# Patient Record
Sex: FEMALE | Race: WHITE | NOT HISPANIC OR LATINO | Employment: FULL TIME | URBAN - METROPOLITAN AREA
[De-identification: names, ages, dates, MRNs, and addresses within clinical notes are randomized per-mention and may not be internally consistent; named-entity substitution may affect disease eponyms.]

---

## 2017-10-17 ENCOUNTER — ALLSCRIPTS OFFICE VISIT (OUTPATIENT)
Dept: OTHER | Facility: OTHER | Age: 32
End: 2017-10-17

## 2017-10-18 NOTE — PROCEDURES
Assessment  1  Amenorrhea, secondary (626 0) (N91 1)    Plan  Bleeding precautions given  Return in 1 week for repeat ultrasound  Active Problems  1  Abdominal pain (789 00) (R10 9)   2  Arthralgia of multiple joints (719 49) (M25 50)   3  Cervical polyp (622 7) (N84 1)   4  Depression with anxiety (300 4) (F41 8)   5  Encounter for annual physical exam (V70 0) (Z00 00)   6  Encounter for Papanicolaou smear for cervical cancer screening (V76 2) (Z12 4)   7  Encounter for preconception consultation (V26 49) (Z31 69)   8  Fatigue (780 79) (R53 83)   9  Female pelvic pain (625 9) (R10 2)   10  Irritable bowel syndrome (564 1) (K58 9)   11  Pap smear abnormality of cervix with ASCUS favoring benign (795 01) (R87 610)   12  UTI (urinary tract infection) (599 0) (N39 0)   13  Vitamin D deficiency (268 9) (E55 9)    Current Meds  1  Vitafol Oral Tablet; Therapy: (Recorded:17Oct2017) to Recorded    Allergies  1  No Known Drug Allergies    Results/Data  79510 Transvaginal Ultrasound OB [facility]:   Procedure: [id number] pregnant uterus real time with image documentation, fetal and maternal evaluation, first trimester (<14 weeks 0 days), transvaginal approach: single or first gestation  -- The study was done today in the office  Exam indication: Amenorrhea  Findings:   Number of gestational sacs and fetuses: One sac, 1 fetus  Gestational sac/fetal measurements appropriate for gestation: CRL at 6 and 0/7 weeks  Survey of visible fetal and placental anatomic structure No fetal heart rate seen  Qualitative assessment of amniotic fluid volume/gestational sac shape: Normal fluid, small gestational sac; + yolk sac  Exam of maternal uterus and adnexa: Normal uterus and ovaries; no free fluid        Signatures   Electronically signed by : VENANCIO Porter ; Oct 17 2017  4:09PM EST                       (Author)

## 2017-10-19 ENCOUNTER — GENERIC CONVERSION - ENCOUNTER (OUTPATIENT)
Dept: OTHER | Facility: OTHER | Age: 32
End: 2017-10-19

## 2017-10-26 ENCOUNTER — HOSPITAL ENCOUNTER (EMERGENCY)
Facility: HOSPITAL | Age: 32
Discharge: HOME | End: 2017-10-26
Attending: EMERGENCY MEDICINE | Admitting: EMERGENCY MEDICINE
Payer: COMMERCIAL

## 2017-10-26 ENCOUNTER — GENERIC CONVERSION - ENCOUNTER (OUTPATIENT)
Dept: OTHER | Facility: OTHER | Age: 32
End: 2017-10-26

## 2017-10-26 VITALS
OXYGEN SATURATION: 100 % | SYSTOLIC BLOOD PRESSURE: 108 MMHG | HEIGHT: 63 IN | TEMPERATURE: 99.2 F | HEART RATE: 86 BPM | WEIGHT: 138 LBS | RESPIRATION RATE: 18 BRPM | BODY MASS INDEX: 24.45 KG/M2 | DIASTOLIC BLOOD PRESSURE: 63 MMHG

## 2017-10-26 DIAGNOSIS — N93.9 VAGINAL BLEEDING: Primary | ICD-10-CM

## 2017-10-26 DIAGNOSIS — O03.9 SPONTANEOUS MISCARRIAGE: ICD-10-CM

## 2017-10-26 LAB
ABO GROUP BLD: NORMAL
ALBUMIN SERPL BCP-MCNC: 4.1 G/DL (ref 3.5–5)
ALP SERPL-CCNC: 50 U/L (ref 46–116)
ALT SERPL W P-5'-P-CCNC: 26 U/L (ref 12–78)
ANION GAP SERPL CALCULATED.3IONS-SCNC: 6 MMOL/L (ref 4–13)
APTT PPP: 29 SECONDS (ref 23–35)
AST SERPL W P-5'-P-CCNC: 16 U/L (ref 5–45)
BASOPHILS # BLD AUTO: 0.11 THOUSANDS/ΜL (ref 0–0.1)
BASOPHILS NFR BLD AUTO: 1 % (ref 0–1)
BILIRUB SERPL-MCNC: 0.33 MG/DL (ref 0.2–1)
BLD GP AB SCN SERPL QL: NEGATIVE
BUN SERPL-MCNC: 15 MG/DL (ref 5–25)
CALCIUM SERPL-MCNC: 9.3 MG/DL (ref 8.3–10.1)
CHLORIDE SERPL-SCNC: 105 MMOL/L (ref 100–108)
CO2 SERPL-SCNC: 26 MMOL/L (ref 21–32)
CREAT SERPL-MCNC: 0.84 MG/DL (ref 0.6–1.3)
EOSINOPHIL # BLD AUTO: 0.23 THOUSAND/ΜL (ref 0–0.61)
EOSINOPHIL NFR BLD AUTO: 2 % (ref 0–6)
ERYTHROCYTE [DISTWIDTH] IN BLOOD BY AUTOMATED COUNT: 13.5 % (ref 11.6–15.1)
GFR SERPL CREATININE-BSD FRML MDRD: 92 ML/MIN/1.73SQ M
GLUCOSE SERPL-MCNC: 93 MG/DL (ref 65–140)
HCT VFR BLD AUTO: 34.9 % (ref 34.8–46.1)
HGB BLD-MCNC: 12 G/DL (ref 11.5–15.4)
INR PPP: 1.1 (ref 0.86–1.16)
LYMPHOCYTES # BLD AUTO: 4.75 THOUSANDS/ΜL (ref 0.6–4.47)
LYMPHOCYTES NFR BLD AUTO: 33 % (ref 14–44)
MCH RBC QN AUTO: 29.6 PG (ref 26.8–34.3)
MCHC RBC AUTO-ENTMCNC: 34.4 G/DL (ref 31.4–37.4)
MCV RBC AUTO: 86 FL (ref 82–98)
MONOCYTES # BLD AUTO: 1.12 THOUSAND/ΜL (ref 0.17–1.22)
MONOCYTES NFR BLD AUTO: 8 % (ref 4–12)
NEUTROPHILS # BLD AUTO: 8.15 THOUSANDS/ΜL (ref 1.85–7.62)
NEUTS SEG NFR BLD AUTO: 56 % (ref 43–75)
NRBC BLD AUTO-RTO: 0 /100 WBCS
PLATELET # BLD AUTO: 371 THOUSANDS/UL (ref 149–390)
PMV BLD AUTO: 9.6 FL (ref 8.9–12.7)
POTASSIUM SERPL-SCNC: 4 MMOL/L (ref 3.5–5.3)
PROT SERPL-MCNC: 8.2 G/DL (ref 6.4–8.2)
PROTHROMBIN TIME: 14.2 SECONDS (ref 12.1–14.4)
RBC # BLD AUTO: 4.05 MILLION/UL (ref 3.81–5.12)
RH BLD: POSITIVE
SODIUM SERPL-SCNC: 137 MMOL/L (ref 136–145)
SPECIMEN EXPIRATION DATE: NORMAL
WBC # BLD AUTO: 14.4 THOUSAND/UL (ref 4.31–10.16)

## 2017-10-26 PROCEDURE — 96360 HYDRATION IV INFUSION INIT: CPT

## 2017-10-26 PROCEDURE — 85610 PROTHROMBIN TIME: CPT | Performed by: EMERGENCY MEDICINE

## 2017-10-26 PROCEDURE — 80053 COMPREHEN METABOLIC PANEL: CPT | Performed by: EMERGENCY MEDICINE

## 2017-10-26 PROCEDURE — 88305 TISSUE EXAM BY PATHOLOGIST: CPT | Performed by: OBSTETRICS & GYNECOLOGY

## 2017-10-26 PROCEDURE — 85025 COMPLETE CBC W/AUTO DIFF WBC: CPT | Performed by: EMERGENCY MEDICINE

## 2017-10-26 PROCEDURE — 96361 HYDRATE IV INFUSION ADD-ON: CPT

## 2017-10-26 PROCEDURE — 36415 COLL VENOUS BLD VENIPUNCTURE: CPT | Performed by: EMERGENCY MEDICINE

## 2017-10-26 PROCEDURE — 86900 BLOOD TYPING SEROLOGIC ABO: CPT | Performed by: EMERGENCY MEDICINE

## 2017-10-26 PROCEDURE — 86850 RBC ANTIBODY SCREEN: CPT | Performed by: EMERGENCY MEDICINE

## 2017-10-26 PROCEDURE — 86901 BLOOD TYPING SEROLOGIC RH(D): CPT | Performed by: EMERGENCY MEDICINE

## 2017-10-26 PROCEDURE — 85730 THROMBOPLASTIN TIME PARTIAL: CPT | Performed by: EMERGENCY MEDICINE

## 2017-10-26 PROCEDURE — 99284 EMERGENCY DEPT VISIT MOD MDM: CPT

## 2017-10-26 RX ADMIN — SODIUM CHLORIDE 1000 ML: 0.9 INJECTION, SOLUTION INTRAVENOUS at 16:04

## 2017-10-26 NOTE — ED NOTES
OB at bedside evaluating patient, states she can be discharged once her fluids are in       Tejinder Velázquez RN  10/26/17 6141

## 2017-10-26 NOTE — ED PROVIDER NOTES
History  Chief Complaint   Patient presents with    Vaginal Bleeding - Pregnant     Pt states was sent in from [facility] office for heavy vaginal bleeding  Pt 11 weeks pregnant and having miscarriage, Pt received medication from OB prior to arrival   Pt states soaking three pads an hour     50-year-old female presents for evaluation of vaginal bleeding  Patient reports vaginal spotting for the past week; however, vaginal bleeding significantly worsened today around 9:45 a m  Nery Cutting Patient states that she has gone through 3 pads an hour since worsening of the bleeding  Vaginal bleeding was initially associated with cramping lower abdominal pain which resolved after evacuation of the uterus at her OB office  Patient went to see her obstetrician, Dr Nakita Escoto, this afternoon was concerned that the amount of bleeding  Patient states that after Dr Nakita Escoto performed the evacuation, she noted fairly rapid reaccumulation of the blood  Patient was given Cytotec in the office and sent to the emergency department for further evaluation  Patient reports postural lightheadedness, but no episodes of syncope  No recent fevers or chills  No nausea or vomiting  Patient is otherwise healthy with no history of bleeding disorders  History of  with prior pregnancy in   On review of records, patient had an ultrasound on 10/17/17 that showed an intrauterine pregnancy with no detectable heart rate  History provided by:  Patient  Vaginal Bleeding   Quality:  Dark red, bright red, passed tissue and clots  Severity:  Severe  Onset quality:  Gradual  Duration:  1 week  Timing:  Constant  Progression:  Worsening  Chronicity:  New  Number of pads used:  3/hour  Possible pregnancy: known fetal demise  Context: spontaneously    Relieved by: Cytotec    Worsened by:  Nothing  Ineffective treatments:  None tried  Associated symptoms: fatigue    Associated symptoms: no abdominal pain, no dysuria, no fever and no nausea    Risk factors: no bleeding disorder        None       History reviewed  No pertinent past medical history  History reviewed  No pertinent surgical history  History reviewed  No pertinent family history  I have reviewed and agree with the history as documented  Social History   Substance Use Topics    Smoking status: Former Smoker    Smokeless tobacco: Never Used    Alcohol use No        Review of Systems   Constitutional: Positive for fatigue  Negative for appetite change, chills and fever  HENT: Negative for congestion, rhinorrhea and sore throat  Respiratory: Negative for cough, chest tightness and shortness of breath  Cardiovascular: Negative for chest pain, palpitations and leg swelling  Gastrointestinal: Negative for abdominal pain, constipation, diarrhea, nausea and vomiting  Genitourinary: Positive for vaginal bleeding  Negative for dysuria, frequency and hematuria  Musculoskeletal: Negative for myalgias, neck pain and neck stiffness  Skin: Negative for pallor and rash  Neurological: Positive for light-headedness  Negative for syncope and headaches  All other systems reviewed and are negative  Physical Exam  ED Triage Vitals [10/26/17 1508]   Temperature Pulse Respirations Blood Pressure SpO2   99 2 °F (37 3 °C) (!) 108 17 110/54 98 %      Temp Source Heart Rate Source Patient Position - Orthostatic VS BP Location FiO2 (%)   Oral Monitor Sitting Left arm --      Pain Score       2           Orthostatic Vital Signs  Vitals:    10/26/17 1508 10/26/17 1600 10/26/17 1656   BP: 110/54 108/59 105/73   Pulse: (!) 108 86 83   Patient Position - Orthostatic VS: Sitting Lying Lying       Physical Exam   Constitutional: She is oriented to person, place, and time  She appears well-developed and well-nourished  Non-toxic appearance  No distress  HENT:   Head: Normocephalic and atraumatic  Eyes: Conjunctivae and EOM are normal  Pupils are equal, round, and reactive to light     Neck: Normal range of motion  Neck supple  No tracheal deviation present  No thyromegaly present  Cardiovascular: Regular rhythm, normal heart sounds and intact distal pulses  Tachycardia present  Pulmonary/Chest: Effort normal and breath sounds normal    Abdominal: Soft  Bowel sounds are normal  She exhibits no distension  There is no tenderness  Musculoskeletal: She exhibits no edema  Lymphadenopathy:     She has no cervical adenopathy  Neurological: She is alert and oriented to person, place, and time  Skin: Skin is warm and dry  She is not diaphoretic  Psychiatric: She has a normal mood and affect  Her behavior is normal  Judgment and thought content normal    Nursing note and vitals reviewed  ED Medications  Medications   sodium chloride 0 9 % bolus 1,000 mL (1,000 mL Intravenous New Bag 10/26/17 1605)       Diagnostic Studies  Results Reviewed     Procedure Component Value Units Date/Time    Comprehensive metabolic panel [[id number]] Collected:  10/26/17 1555    Lab Status:  Final result Specimen:  Blood from Arm, Left Updated:  10/26/17 1620     Sodium 137 mmol/L      Potassium 4 0 mmol/L      Chloride 105 mmol/L      CO2 26 mmol/L      Anion Gap 6 mmol/L      BUN 15 mg/dL      Creatinine 0 84 mg/dL      Glucose 93 mg/dL      Calcium 9 3 mg/dL      AST 16 U/L      ALT 26 U/L      Alkaline Phosphatase 50 U/L      Total Protein 8 2 g/dL      Albumin 4 1 g/dL      Total Bilirubin 0 33 mg/dL      eGFR 92 ml/min/1 73sq m     Narrative:         National Kidney Disease Education Program recommendations are as follows:  GFR calculation is accurate only with a steady state creatinine  Chronic Kidney disease less than 60 ml/min/1 73 sq  meters  Kidney failure less than 15 ml/min/1 73 sq  meters      [facility] [[id number]]  (Normal) Collected:  10/26/17 1555    Lab Status:  Final result Specimen:  Blood from Arm, Left Updated:  10/26/17 1616     Protime 14 2 seconds      INR 1 10    APTT [[id number]]  (Normal) Collected:  10/26/17 1555    Lab Status:  Final result Specimen:  Blood from Arm, Left Updated:  10/26/17 1616     PTT 29 seconds     Narrative: Therapeutic Heparin Range = 60-90 seconds    CBC and differential [[id number]]  (Abnormal) Collected:  10/26/17 1555    Lab Status:  Final result Specimen:  Blood from Arm, Left Updated:  10/26/17 1607     WBC 14 40 (H) Thousand/uL      RBC 4 05 Million/uL      Hemoglobin 12 0 g/dL      Hematocrit 34 9 %      MCV 86 fL      MCH 29 6 pg      MCHC 34 4 g/dL      RDW 13 5 %      MPV 9 6 fL      Platelets 153 Thousands/uL      nRBC 0 /100 WBCs      Neutrophils Relative 56 %      Lymphocytes Relative 33 %      Monocytes Relative 8 %      Eosinophils Relative 2 %      Basophils Relative 1 %      Neutrophils Absolute 8 15 (H) Thousands/µL      Lymphocytes Absolute 4 75 (H) Thousands/µL      Monocytes Absolute 1 12 Thousand/µL      Eosinophils Absolute 0 23 Thousand/µL      Basophils Absolute 0 11 (H) Thousands/µL                  No orders to display         Procedures  Procedures      Phone Consults  ED Phone Contact    ED Course  ED Course                                MDM  Number of Diagnoses or Management Options  Spontaneous miscarriage: new and requires workup  Vaginal bleeding: new and requires workup  Diagnosis management comments: 76-year-old female with a known fetal demise presents for evaluation of increased vaginal bleeding  Patient tachycardic with postural lightheadedness  Patient given 1 L NS bolus  OB/Gyn consulted and evaluated the patient in the emergency department  Several clots removed by OB/Gyn  Bleeding significantly decreased  Patient feeling well  Labs unremarkable  Discharged with OB/Gyn follow up and strict return precautions         Amount and/or Complexity of Data Reviewed  Clinical lab tests: ordered and reviewed    Patient Progress  Patient progress: stable    CritCare Time    Disposition  Final diagnoses:   Vaginal bleeding   Spontaneous miscarriage     Time reflects when diagnosis was documented in both MDM as applicable and the Disposition within this note     Time User Action Codes Description Comment    10/26/2017  3:51 PM Norma Nguyen Add [N93 9] Vaginal bleeding     10/26/2017  5:34 PM Elizabeth Roland Add [O03 9] Spontaneous miscarriage       ED Disposition     ED Disposition Condition Comment    Discharge  Chelsea Fraga discharge to home/self care  Condition at discharge: Stable        Follow-up Information     Follow up With Specialties Details Why Contact Info Additional Chandra Gonzalez MD Obstetrics and Gynecology Schedule an appointment as soon as possible for a visit in 3 days for re-evaluation [address] [address]  [phone]       [address] [place] [address] Emergency Department Emergency Medicine Go to If symptoms worsen [address] [facility] ED, [address] [place] [address], [id number]        Patient's Medications    No medications on file     No discharge procedures on file  ED Provider  Attending physically available and evaluated Chelsea Fraga I managed the patient along with the ED Attending      Electronically Signed by         Teri Roberts MD  Resident  10/26/17 9331

## 2017-10-26 NOTE — CONSULTS
Consultation - Gynecology  Canelo Fraga 34 y o  female MRN: [id number]  Unit/Bed#: ED 23 Encounter: [id number]      Inpatient consult to Obstetrics / Gynecology  Consult performed by: Randy Lomeli  Consult ordered by: Lenora Eden          Chief Complaint   Patient presents with    Vaginal Bleeding - Pregnant     Pt states was sent in from [facility] office for heavy vaginal bleeding  Pt 11 weeks pregnant and having miscarriage, Pt received medication from OB prior to arrival   Pt states soaking three pads an hour       History of Present Illness   Physician Requesting Consult: Samantha Fernandez DO  Reason for Consult / Principal Problem: incomplete , vaginal bleeding  Subspeciality: General GYN  HPI: Daily Hollis is a 34y o  year old female who presents with heavy vaginal bleeding associated with incomplete   Patient was evaluated in the office by her primary gyn and then sent to the ED for further evaluation and observation from the office  She reports that she had been having heavy bleeding, soaking 2-3 pads/hour, for the past 4 hours and passing multiple large clots and tissue  Some tissue was removed from the external cervical os in the office, and she was provided with 800mcg misoprostol buccally prior to presenting to the ED  Ultrasound in office noted small gestational sac in lower uterine segment  Since arriving in the ED, her cramping has subsided significantly  Bleeding has also subsided significantly as well  Review of Systems   Respiratory: Negative for chest tightness, shortness of breath and wheezing  Cardiovascular: Negative for chest pain and palpitations  Gastrointestinal: Negative for abdominal pain, constipation, diarrhea and vomiting  Genitourinary: Positive for vaginal bleeding  Negative for difficulty urinating, dyspareunia, vaginal discharge and vaginal pain  Neurological: Negative for dizziness, syncope, weakness and light-headedness  Historical Information   History reviewed  No pertinent past medical history  History reviewed  No pertinent surgical history  OB History    Para Term  AB Living   1             SAB TAB Ectopic Multiple Live Births                  # Outcome Date GA Lbr Tray/2nd Weight Sex Delivery Anes PTL Lv   1 Current                 History reviewed  No pertinent family history  Social History   History   Alcohol Use No     History   Drug Use No     History   Smoking Status    Former Smoker   Smokeless Tobacco    Never Used       Meds/Allergies   No current facility-administered medications for this encounter  No Known Allergies    Objective   Vitals: Blood pressure 105/73, pulse 83, temperature 99 2 °F (37 3 °C), temperature source Oral, resp  rate 18, height 5' 3" (1 6 m), weight 62 6 kg (138 lb), SpO2 100 %  Body mass index is 24 45 kg/m²  No intake or output data in the 24 hours ending 10/26/17 1734    Invasive Devices          No matching active lines, drains, or airways          Physical Exam   Constitutional: She is oriented to person, place, and time  She appears well-developed and well-nourished  No distress  HENT:   Head: Normocephalic and atraumatic  Cardiovascular: Normal rate and normal heart sounds  Pulmonary/Chest: Effort normal  No respiratory distress  She exhibits no tenderness  Abdominal: Soft  There is no tenderness  There is no rebound and no guarding  Genitourinary: Vagina normal    Genitourinary Comments: Sterile speculum exam: plum size clot (~100cc) visualized in vagina  Removed easily with proctoswabs  Observed bleeding from cervix, bleeding controlled and minimal    Bimanual exam: uterus mobile, small in size and deep in pelvis  Cervix closed  No masses palpated bilaterally  Musculoskeletal: Normal range of motion  Neurological: She is alert and oriented to person, place, and time  Skin: Skin is warm and dry  She is not diaphoretic     Psychiatric: She has a normal mood and affect  Her behavior is normal  Judgment and thought content normal        Lab Results:   Recent Results (from the past 24 hour(s))   CBC and differential    Collection Time: 10/26/17  3:55 PM   Result Value Ref Range    WBC 14 40 (H) 4 31 - 10 16 Thousand/uL    RBC 4 05 3 81 - 5 12 Million/uL    Hemoglobin 12 0 11 5 - 15 4 g/dL    Hematocrit 34 9 34 8 - 46 1 %    MCV 86 82 - 98 fL    MCH 29 6 26 8 - 34 3 pg    MCHC 34 4 31 4 - 37 4 g/dL    RDW 13 5 11 6 - 15 1 %    MPV 9 6 8 9 - 12 7 fL    Platelets 704 044 - 554 Thousands/uL    nRBC 0 /100 WBCs    Neutrophils Relative 56 43 - 75 %    Lymphocytes Relative 33 14 - 44 %    Monocytes Relative 8 4 - 12 %    Eosinophils Relative 2 0 - 6 %    Basophils Relative 1 0 - 1 %    Neutrophils Absolute 8 15 (H) 1 85 - 7 62 Thousands/µL    Lymphocytes Absolute 4 75 (H) 0 60 - 4 47 Thousands/µL    Monocytes Absolute 1 12 0 17 - 1 22 Thousand/µL    Eosinophils Absolute 0 23 0 00 - 0 61 Thousand/µL    Basophils Absolute 0 11 (H) 0 00 - 0 10 Thousands/µL   Comprehensive metabolic panel    Collection Time: 10/26/17  3:55 PM   Result Value Ref Range    Sodium 137 136 - 145 mmol/L    Potassium 4 0 3 5 - 5 3 mmol/L    Chloride 105 100 - 108 mmol/L    CO2 26 21 - 32 mmol/L    Anion Gap 6 4 - 13 mmol/L    BUN 15 5 - 25 mg/dL    Creatinine 0 84 0 60 - 1 30 mg/dL    Glucose 93 65 - 140 mg/dL    Calcium 9 3 8 3 - 10 1 mg/dL    AST 16 5 - 45 U/L    ALT 26 12 - 78 U/L    Alkaline Phosphatase 50 46 - 116 U/L    Total Protein 8 2 6 4 - 8 2 g/dL    Albumin 4 1 3 5 - 5 0 g/dL    Total Bilirubin 0 33 0 20 - 1 00 mg/dL    eGFR 92 ml/min/1 73sq m   Protime-INR    Collection Time: 10/26/17  3:55 PM   Result Value Ref Range    Protime 14 2 12 1 - 14 4 seconds    INR 1 10 0 86 - 1 16   APTT    Collection Time: 10/26/17  3:55 PM   Result Value Ref Range    PTT 29 23 - 35 seconds       Imaging:  None  Imaging Studies: I have personally reviewed pertinent reports        EKG, Pathology, and Other Studies: I have personally reviewed pertinent reports  Assessment/Plan     Assessment:  31yo  here with incomplete   Plan:  1  Incomplete    Hgb 12 0, ABO O positive  Status post 800 mcg misoprostol buccally  Hemodynamically stable  Bleeding well controlled  Discussed precautions for excessive bleeding with patient  Expect continued bleeding but should not be heavy  Patient to have follow up in approximately 1 week with primary OB/Gyn    Dispo: anticipate discharge home  Patient evaluated with attending, Dr Javier Head MD  10/26/2017  5:34 PM

## 2017-10-26 NOTE — ED ATTENDING ATTESTATION
I, Parul Chase DO, saw and evaluated the patient  I have discussed the patient with the resident/non-physician practitioner and agree with the resident's/non-physician practitioner's findings, Plan of Care, and MDM as documented in the resident's/non-physician practitioner's note, except where noted  All available labs and Radiology studies were reviewed  At this point I agree with the current assessment done in the Emergency Department  I have conducted an independent evaluation of this patient a history and physical is as follows:      Critical Care Time  CritCare Time      34 yr old fem sent to the ED by [facility] doc for vaginal bleeding  Dx with fetal demise and started with spotting on Thurs  This am with incr bleeding to 3 pph   + LH  Exm: heart: tachy  Ob in the room and will do vag exam   Pln: Hgb, fluids

## 2017-10-26 NOTE — DISCHARGE INSTRUCTIONS
Miscarriage   WHAT YOU NEED TO KNOW:   A miscarriage is the loss of a fetus within the first 20 weeks of pregnancy  A miscarriage may also be called a spontaneous  or an early pregnancy loss  DISCHARGE INSTRUCTIONS:   Return to the emergency department if:   · You have foul-smelling drainage or pus coming from your vagina  · You have heavy vaginal bleeding and soak 1 pad or more in an hour  · You have severe abdominal pain  · You feel like your heart is beating faster than normal      · You feel extremely weak or dizzy  Contact your healthcare provider if:   · You have a fever greater than 100 4°F or chills  · You have extreme sadness, grief, or feel unable to cope with what has happened  · You have questions or concerns about your condition or care  Self-care:   · Do not put anything in your vagina for 2 weeks or as directed  Do not use tampons, douche, or have sex  These actions can cause infection and pain  · Use sanitary pads as needed  You may have light bleeding or spotting for 2 weeks  · Do not take a bath or go swimming for 2 weeks or as directed  These actions may increase your risk for an infection  Take showers only  · Rest as needed  Slowly start to do more each day  Return to your daily activities as directed  · Talk to your healthcare provider about birth control  If you would like to prevent another pregnancy, ask your healthcare provider which type of birth control is best for you  · Join a support group or therapy to help you cope  A miscarriage may be very difficult for you, your partner, and other members of your family  There is no right way to feel after a miscarriage  You may feel overwhelming grief or other emotions  It may be helpful to talk to a friend, family member, or counselor about your feelings  You may worry that you could have another miscarriage  Talk to your healthcare provider about your concerns   He may be able to help you reduce the risk for another miscarriage  He may also help you find ways to cope with grief  For more information:   · [employer]  [address] [place] [address]  Phone: [phone]  Phone: [phone]  Web Address: http://[employer]/  org  · [facility]  [address] [facility] , [address] [place] [address]  Web Address: [employer]  Follow up with your healthcare provider as directed: You may need to see your healthcare provider for blood tests or an ultrasound  Write down your questions so you remember to ask them during your visits  © 2017 [facility] Information is for End User's use only and may not be sold, redistributed or otherwise used for commercial purposes  All illustrations and images included in CareNotes® are the copyrighted property of [employer] D A M [employer] Inc  or Dimitris Reyes  The above information is an  only  It is not intended as medical advice for individual conditions or treatments  Talk to your doctor, nurse or pharmacist before following any medical regimen to see if it is safe and effective for you

## 2017-10-30 ENCOUNTER — LAB REQUISITION (OUTPATIENT)
Dept: LAB | Facility: HOSPITAL | Age: 32
End: 2017-10-30
Payer: COMMERCIAL

## 2017-10-30 DIAGNOSIS — O03.4 INCOMPLETE SPONTANEOUS ABORTION WITHOUT COMPLICATION: ICD-10-CM

## 2017-11-01 ENCOUNTER — GENERIC CONVERSION - ENCOUNTER (OUTPATIENT)
Dept: OTHER | Facility: OTHER | Age: 32
End: 2017-11-01

## 2018-01-06 ENCOUNTER — GENERIC CONVERSION - ENCOUNTER (OUTPATIENT)
Dept: OTHER | Facility: OTHER | Age: 33
End: 2018-01-06

## 2018-01-10 ENCOUNTER — ALLSCRIPTS OFFICE VISIT (OUTPATIENT)
Dept: OTHER | Facility: OTHER | Age: 33
End: 2018-01-10

## 2018-01-10 DIAGNOSIS — N39.0 URINARY TRACT INFECTION: ICD-10-CM

## 2018-01-10 DIAGNOSIS — R30.0 DYSURIA: ICD-10-CM

## 2018-01-10 LAB — HCG, QUALITATIVE (HISTORICAL): NEGATIVE

## 2018-01-11 NOTE — RESULT NOTES
Verified Results  (1) COMPREHENSIVE METABOLIC PANEL [id number] 46:58TO Beryl Rapp     Test Name Result Flag Reference   GLUCOSE,RANDM 90         Plan  Arthralgia of multiple joints, Depression with anxiety, Health Maintenance, Fatigue,  Irritable bowel syndrome    · (1) COMPREHENSIVE METABOLIC PANEL; Status:Complete;   Done: [id number]  10:50AM  Health Maintenance    · Urine Dip Automated- POC; Status:Complete;   Done: [id number] 01:18PM  Unlinked    · Prenatal Multivit-Iron TABS

## 2018-01-12 NOTE — PROGRESS NOTES
Active Problems    1  Abdominal pain (789 00) (R10 9)   2  Amenorrhea, secondary (626 0) (N91 1)   3  Arthralgia of multiple joints (719 49) (M25 50)   4  Cervical polyp (622 7) (N84 1)   5  Depression with anxiety (300 4) (F41 8)   6  Encounter for annual physical exam (V70 0) (Z00 00)   7  Encounter for Papanicolaou smear for cervical cancer screening (V76 2) (Z12 4)   8  Encounter for preconception consultation (V26 49) (Z31 69)   9  Fatigue (780 79) (R53 83)   10  Female pelvic pain (625 9) (R10 2)   11  Irritable bowel syndrome (564 1) (K58 9)   12  Pap smear abnormality of cervix with ASCUS favoring benign (795 01) (R87 610)   13  UTI (urinary tract infection) (599 0) (N39 0)   14  Vitamin D deficiency (268 9) (E55 9)    Current Meds   1  Vitafol Oral Tablet; Therapy: (Recorded:17Oct2017) to Recorded    Allergies    1  No Known Drug Allergies    Future Appointments    Date/Time Provider Specialty Site   10/26/2017 05:00 PM VENANCIO Justin  Obstetrics/Gynecology [facility]     Message   Recorded as Task   Date: 10/19/2017 09:34 AM, Created By: Gloria Castro   Task Name: Medical Complaint Callback   Assigned To: Noemí Wiggins   Regarding Patient: Samantha Black, Status: In Progress   Rochelle Antonio - 19 Oct 2017 9:34 AM     TASK CREATED  Dear Alee Benoit:    Good morning  Pt called office this morning, left voicemail message  Pt saw Dr Gurmeet Escalona on 10/17/17  Pt states that she passed a "clot" this morning, was told by Dr Gurmeet Escalona to contact office and inform her of such  Pt is scheduled to see Dr Gurmeet Escalona again on 10/26/17  Pt states she would like to speak with Dr Gurmeet Escalona  Pt can be reached @ [phone]  Thank you and have a good day! Lyudmila Castillo Angela - 19 Oct 2017 9:40 AM     TASK IN PROGRESS   Noemí Wiggins - 19 Oct 2017 9:41 AM     TASK EDITED  Called  to call me at [facility] Oct 2017 1:34 PM     TASK EDITED  Spoke with Dr Gurmeet Escalona  Patient may not being having a miscarriage at this time  She passed tan tissue once this morning and nothing since  No intense cramping or bleeding following it  I told her to call if she has intense cramping or saturating 2 pads a hour for more than two hours  Otherwise keep appointment in 1 week to re check growth  Patient agrees with plan  AP     Signatures   Electronically signed by :  Constanza Colindres, ; Oct 19 2017  1:35PM EST                       (Author)    Electronically signed by : VENANCIO Horne ; Oct 19 2017  1:41PM EST                       (Author)

## 2018-01-13 NOTE — MISCELLANEOUS
Message   Recorded as Task   Date: 10/19/2017 01:35 PM, Created By: Jagdeep Jacob   Task Name: Medical Complaint Callback   Assigned To: Ross Alcantara   Regarding Patient: Ann Postal, Status: Active   CommentNeda Faster - 19 Oct 2017 1:35 PM     TASK CREATED        Active Problems    1  Abdominal pain (789 00) (R10 9)   2  Amenorrhea, secondary (626 0) (N91 1)   3  Arthralgia of multiple joints (719 49) (M25 50)   4  Cervical polyp (622 7) (N84 1)   5  Depression with anxiety (300 4) (F41 8)   6  Encounter for annual physical exam (V70 0) (Z00 00)   7  Encounter for Papanicolaou smear for cervical cancer screening (V76 2) (Z12 4)   8  Encounter for preconception consultation (V26 49) (Z31 69)   9  Fatigue (780 79) (R53 83)   10  Female pelvic pain (625 9) (R10 2)   11  Irritable bowel syndrome (564 1) (K58 9)   12  Pap smear abnormality of cervix with ASCUS favoring benign (795 01) (R87 610)   13  UTI (urinary tract infection) (599 0) (N39 0)   14  Vitamin D deficiency (268 9) (E55 9)    Current Meds   1  Vitafol Oral Tablet; Therapy: (Recorded:17Oct2017) to Recorded    Allergies    1  No Known Drug Allergies    Signatures   Electronically signed by :  Mackenzie Jones, ; Oct 19 2017  1:36PM EST                       (Author)

## 2018-01-14 VITALS
BODY MASS INDEX: 25.16 KG/M2 | DIASTOLIC BLOOD PRESSURE: 64 MMHG | WEIGHT: 142 LBS | HEIGHT: 63 IN | SYSTOLIC BLOOD PRESSURE: 110 MMHG

## 2018-01-18 NOTE — PROGRESS NOTES
Assessment    1  Encounter for annual physical exam (V70 0) (Z00 00)   2  Female pelvic pain (625 9) (R10 2)   3  Fatigue (780 79) (R53 83)   4  Arthralgia of multiple joints (719 49) (M25 50)   5  Abdominal pain (789 00) (R10 9)    Plan  Abdominal pain    · * US ABDOMEN COMPLETE; Status:Active; Requested for:63Xyw3068; Abdominal pain, Fatigue, Female pelvic pain    · (1) URINE CULTURE; Source:Urine, Clean Catch; Status:Active; Requested  for:91Wrr0863; Arthralgia of multiple joints, Depression with anxiety, Health Maintenance, Fatigue,  Irritable bowel syndrome    · (1) CBC/PLT/DIFF; Status:Active; Requested for:40Wna7773;    · (1) COMPREHENSIVE METABOLIC PANEL; Status:Active; Requested for:06Ddu7594;    · (1) LIPID PANEL, FASTING; Status:Active; Requested for:20Jdr2097;    · (1) LYME ANTIBODY PROFILE W/REFLEX TO WESTERN BLOT; Status:Active; Requested for:93Zur1662;    · (1) MAGNESIUM; Status:Active; Requested for:48Gam8756;    · (1) RHEUMATOID FACTOR SCREEN; Status:Active; Requested for:36Ixs7969;    · (1) TSH; Status:Active; Requested for:44Age7441; Arthralgia of multiple joints, Depression with anxiety, Health Maintenance, Fatigue,  Irritable bowel syndrome, Vitamin D deficiency    · (1) VITAMIN D 25-HYDROXY; Status:Active; Requested for:07Uwg1966;   Encounter for Papanicolaou smear for cervical cancer screening    · (Q) THINPREP PAP(REFL)H/L HPV; Status:Active; Requested for:68Ara0180;   : 8-15-16  Female pelvic pain    · (1) GENITAL COMPREHENSIVE CULTURE; Source:Endocervical; Status:Active; Requested for:88Ajy1430;    · * US PELVIS COMPLETE (TRANSABDOMINAL AND TRANSVAGINAL); Status:Active; Requested for:31Vfz1244; Health Maintenance    · Urine Dip Automated- POC; Status:Complete;   Done: 93TBL2402 01:18PM  Vitamin D deficiency    · (Q) GLORIA, IFA W/REFL TO TITER/ PATTERN/LUPUS/SLE ABS; Status:Active; Requested  for:52Ekc7741;    · (Q) BABESIA MICROTI (IGG,IGM); Status:Active;  Requested for:38Nrx7784; Unlinked    · Prenatal Multivit-Iron TABS    Discussion/Summary  health maintenance visit Currently, she eats a healthy diet and has an adequate exercise regimen  Pap test with reflex HPV testing was done today Breast cancer screening: the risks and benefits of breast cancer screening were discussed  Colorectal cancer screening: the risks and benefits of colorectal cancer screening were discussed  Osteoporosis screening: bone mineral density testing is not indicated  Screening lab work includes hemoglobin, glucose, lipid profile, thyroid function testing and 25-hydroxyvitamin D  The risks and benefits of immunizations were discussed  Advice and education were given regarding nutrition, weight bearing exercise, reproductive health, self skin examination, helmet use and seat belt use  Patient discussion: discussed with the patient  Possible side effects of new medications were reviewed with the patient/guardian today  Chief Complaint  pt here for pap and PE pt been having symptoms of achy body,headache ,fatigue last week for 3 days   This has happened before   tc/cma      History of Present Illness  HM, Adult Female: The patient is being seen for a health maintenance and gynecology evaluation  General Health: The patient's health since the last visit is described as good  She has regular dental visits  She complains of vision problems  She denies hearing loss  Lifestyle:  She consumes a diverse and healthy diet  She exercises regularly  She does not use tobacco  She consumes alcohol  She reports occasional alcohol use  She denies drug use  Reproductive health:  she is sexually active  pregnancy history: G 1P 1  Screening: cancer screening reviewed and updated  metabolic screening reviewed and updated  risk screening reviewed and updated  HPI: Dr Kem Medrano      Review of Systems    Constitutional: feeling tired  Eyes: eyesight problems     ENT: no complaints of earache, no loss of hearing, no nose bleeds, no nasal discharge, no sore throat, no hoarseness  Cardiovascular: No complaints of slow heart rate, no fast heart rate, no chest pain, no palpitations, no leg claudication, no lower extremity edema  Respiratory: No complaints of shortness of breath, no wheezing, no cough, no SOB on exertion, no orthopnea, no PND  Gastrointestinal: IBS  Genitourinary: pelvic pain and vaginal discharge  Musculoskeletal: arthralgias and myalgias  Integumentary: No complaints of skin rash or lesions, no itching, no skin wounds, no breast pain or lump  Neurological: headache  Psychiatric: Not suicidal, no sleep disturbance, no anxiety or depression, no change in personality, no emotional problems  Endocrine: No complaints of proptosis, no hot flashes, no muscle weakness, no deepening of the voice, no feelings of weakness  Hematologic/Lymphatic: No complaints of swollen glands, no swollen glands in the neck, does not bleed easily, does not bruise easily  Active Problems    1  Abdominal pain (789 00) (R10 9)   2  Arthralgia of multiple joints (719 49) (M25 50)   3  Depression with anxiety (300 4) (F41 8)   4  Encounter for Papanicolaou smear for cervical cancer screening (V76 2) (Z12 4)   5  Fatigue (780 79) (R53 83)   6  Female pelvic pain (625 9) (R10 2)   7  Irritable bowel syndrome (564 1) (K58 9)   8   Vitamin D deficiency (268 9) (E55 9)    Past Medical History    · History of Acute tonsillitis (463) (J03 90)   · History of Acute upper respiratory infection (465 9) (J06 9)   · History of Back Strain (847 9)   · History of Benign paroxysmal vertigo, unspecified laterality (386 11) (H81 10)   · History of Dysuria (788 1) (R30 0)   · History of Herpes simplex type 1 infection (054 9) (B00 9)   · History of acute sinusitis (V12 69) (Z87 09)   · History of backache (V13 59) (Z87 39)   · History of dyspareunia (V13 29)   · History of dyspareunia (V13 29)   · History of headache (V13 89) (Z87 898)   · History of viral gastroenteritis (V12 09) (Z86 19)   · History of Skin rash (782 1) (R21)   · History of Sore throat (462) (J02 9)   · History of Vaginal candidiasis (112 1) (B37 3)   · Viral gastroenteritis (008 8) (A08 4)   · History of Vitamin B-complex deficiency (266 9) (E53 9)   · History of Vulvovaginitis (616 10) (N76 0)    Family History  Mother    · Family history of cardiac disorder (V17 49) (Z82 49)   · Family history of Well adult  Father    · Family history of cardiac disorder (V17 49) (Z80 55)    Social History    · Being A Social Drinker   · History of Current Some Day Smoker (305 1)   ·    · Never a smoker   · Never A Smoker   · No drug use    --wjs-Ftqyyk-3 yrs old  Pt works as [facility]  Current Meds   1  Prenatal Multivit-Iron TABS; Therapy: (Recorded:15Fig8625) to Recorded    Allergies    1  No Known Drug Allergies    Vitals   Recorded: 88LGE4358 38:83XD   Systolic 349, RUE, Sitting   Diastolic 62, RUE, Sitting   Heart Rate 72, R Radial   Pulse Quality Normal, R Radial   Respiration Quality Normal   Respiration 16   Temperature 98 4 F, Tympanic   Height 5 ft 4 in   Weight 137 lb 9 6 oz   BMI Calculated 23 62   BSA Calculated 1 67     Physical Exam    Constitutional   General appearance: No acute distress, well appearing and well nourished  Eyes   Conjunctiva and lids: No swelling, erythema or discharge  Pupils and irises: Equal, round, reactive to light  Ears, Nose, Mouth, and Throat   External inspection of ears and nose: Normal     Otoscopic examination: Tympanic membranes translucent with normal light reflex  Canals patent without erythema  Hearing: Normal     Nasal mucosa, septum, and turbinates: Normal without edema or erythema  Lips, teeth, and gums: Normal, good dentition  Oropharynx: Normal with no erythema, edema, exudate or lesions      Neck   Neck: Supple, symmetric, trachea midline, no masses  Thyroid: Normal, no thyromegaly  Pulmonary   Respiratory effort: No increased work of breathing or signs of respiratory distress  Auscultation of lungs: Clear to auscultation  Cardiovascular   Auscultation of heart: Normal rate and rhythm, normal S1 and S2, no murmurs  Pedal pulses: 2+ bilaterally  Examination of extremities for edema and/or varicosities: Normal     Chest   Breasts: Normal, no dimpling or skin changes appreciated  Palpation of breasts and axillae: Normal, no masses palpated  Abdomen   Abdomen: Non-tender, no masses  Liver and spleen: No hepatomegaly or splenomegaly  Examination for hernias: No hernia appreciated  Lymphatic   Palpation of lymph nodes in neck: No lymphadenopathy  Palpation of lymph nodes in axillae: No lymphadenopathy  Palpation of lymph nodes in groin: No lymphadenopathy  Musculoskeletal   Gait and station: Normal     Digits and nails: Normal without clubbing or cyanosis  Joints, bones, and muscles: Normal     Range of motion: Normal     Stability: Normal     Muscle strength/tone: Normal     Skin   Skin and subcutaneous tissue: Normal without rashes or lesions  Neurologic   Cranial nerves: Cranial nerves II-XII intact  Reflexes: 2+ and symmetric  Sensation: No sensory loss  Psychiatric   Judgment and insight: Normal     Orientation to person, place, and time: Normal     Recent and remote memory: Intact      Mood and affect: Normal        Results/Data  Urine Dip Automated- POC [id number] 01:18PM Selena Yuan     Test Name Result Flag Reference   Color Yellow     Clarity Transparent     Leukocytes 25     Nitrite neg     Blood 50     Bilirubin neg     Urobilinogen normal     Protein neg     Ph 5     Specific Gravity 1 025     Ketone neg     Glucose norm       PHQ-9 Adult Depression Screening 92Xep1720 01:14PM UserCollins     Test Name Result Flag Reference   PHQ-9 Adult Depression Score 11     Over the last two weeks, how often have you been bothered by any of the following problems? Little interest or pleasure in doing things: Several days - 1  Feeling down, depressed, or hopeless: Several days - 1  Trouble falling or staying asleep, or sleeping too much: Several days - 1  Feeling tired or having little energy: Nearly every day - 3  Poor appetite or over eating: Not at all - 0  Feeling bad about yourself - or that you are a failure or have let yourself or your family down: Several days - 1  Trouble concentrating on things, such as reading the newspaper or watching television: Several days - 1  Moving or speaking so slowly that other people could have noticed  Or the opposite -  being so fidgety or restless that you have been moving around a lot more than usual: Nearly every day - 3  Thoughts that you would be better off dead, or of hurting yourself in some way: Not at all - 0   PHQ-9 Adult Depression Screening Positive     PHQ-9 Difficulty Level Very difficult     PHQ-9 Severity Moderate Depression         Procedure    Procedure: Visual Acuity Test    Indication: routine screening  Inforrmation supplied by a Snellen chart     Results: 20/13 in both eyes without corrective device, 20/15 in the right eye without corrective device, 20/15 in the left eye without corrective device      Signatures   Electronically signed by : VENANCIO Fernandes ; Aug 18 2016 12:58PM EST                       (Author)

## 2018-01-22 VITALS
BODY MASS INDEX: 24.98 KG/M2 | HEIGHT: 63 IN | WEIGHT: 141 LBS | SYSTOLIC BLOOD PRESSURE: 110 MMHG | DIASTOLIC BLOOD PRESSURE: 54 MMHG

## 2018-01-22 VITALS
SYSTOLIC BLOOD PRESSURE: 110 MMHG | HEIGHT: 63 IN | DIASTOLIC BLOOD PRESSURE: 62 MMHG | BODY MASS INDEX: 24.45 KG/M2 | WEIGHT: 138 LBS

## 2018-01-22 VITALS
BODY MASS INDEX: 24.45 KG/M2 | SYSTOLIC BLOOD PRESSURE: 100 MMHG | DIASTOLIC BLOOD PRESSURE: 68 MMHG | HEIGHT: 63 IN | WEIGHT: 138 LBS

## 2018-01-23 NOTE — MISCELLANEOUS
Message   Recorded as Task   Date: 2018 02:50 PM, Created By: Enrique Castro   Task Name: Medical Complaint Callback   Assigned To: Shahnaz Maynard   Regarding Patient: Verito Us, Status: In Progress   Comment:    Jil Cannon - 2018 2:50 PM     TASK CREATED  Caller: Self; Medical Complaint; [phone] (Home); [phone] (Work)  pt has a uti she is being treated for it the oncall doctor over the weekend put her on medication however she stated there is some blood in her urine and alots discomford pain on and off very concern, pt's phone number [phone]  Hailey Ca - 2018 2:53 PM     TASK IN PROGRESS   Hailey Ca - 2018 3:04 PM     TASK EDITED  spoke with pt    treated by np in office where she works with Bactrim following u/a    culture was neg    prior to that spoke with vg on the phone  and was given rx for ceftin which she did not take    now having heavy disch    apt 01/10 with ri7        Active Problems    1  Abdominal pain (789 00) (R10 9)   2  , spontaneous complete (634 92) (O03 9)   3  Amenorrhea, secondary (626 0) (N91 1)   4  Arthralgia of multiple joints (719 49) (M25 50)   5  Cervical polyp (622 7) (N84 1)   6  Depression with anxiety (300 4) (F41 8)   7  Encounter for annual physical exam (V70 0) (Z00 00)   8  Encounter for Papanicolaou smear for cervical cancer screening (V76 2) (Z12 4)   9  Encounter for preconception consultation (V26 49) (Z31 69)   10  Fatigue (780 79) (R53 83)   11  Female pelvic pain (625 9) (R10 2)   12  Irritable bowel syndrome (564 1) (K58 9)   13  Pap smear abnormality of cervix with ASCUS favoring benign (795 01) (R87 610)   14  UTI (urinary tract infection) (599 0) (N39 0)   15  Vitamin D deficiency (268 9) (E55 9)    Current Meds   1  Sulfamethoxazole-Trimethoprim 800-160 MG Oral Tablet (Bactrim DS); TAKE 1 TABLET   TWICE DAILY; Therapy: 38Pqu8456 to (Evaluate:2018)  Requested for: 72JGE1671;  Last UD:79DJY6366 Ordered   2  Vitafol Oral Tablet; Therapy: (Recorded:17Oct2017) to Recorded    Allergies    1   No Known Drug Allergies    Signatures   Electronically signed by : Robinson Wall, ; Jan 9 2018  3:04PM EST                       (Author)

## 2018-01-23 NOTE — MISCELLANEOUS
Message  patient called about hemorrhagic cystitis  Rx Bactrim DS      Plan  UTI (urinary tract infection)    · From  Bactrim -160 MG Oral Tablet one bid To  Sulfamethoxazole-Trimethoprim 800-160 MG Oral Tablet (Bactrim DS) TAKE 1 TABLET  TWICE DAILY    Signatures   Electronically signed by : VENANCIO Rodrigez ; Jan 6 2018  9:51PM EST                       (Author)

## 2018-02-07 LAB
APPEARANCE UR: CLEAR
BACTERIA UR QL AUTO: ABNORMAL /HPF
BILIRUB UR QL STRIP: NEGATIVE
COLOR UR: YELLOW
GLUCOSE UR QL STRIP: NEGATIVE
HGB UR QL STRIP: NEGATIVE
HYALINE CASTS #/AREA URNS LPF: ABNORMAL /LPF
KETONES UR QL STRIP: NEGATIVE
LEUKOCYTE ESTERASE UR QL STRIP: ABNORMAL
NITRITE UR QL STRIP: NEGATIVE
PH UR STRIP: 6.5 [PH] (ref 5–8)
PROT UR QL STRIP: NEGATIVE
RBC #/AREA URNS HPF: ABNORMAL /HPF
SP GR UR STRIP: 1.02 (ref 1–1.03)
SQUAMOUS #/AREA URNS HPF: ABNORMAL /HPF
WBC #/AREA URNS HPF: ABNORMAL /HPF

## 2018-02-09 ENCOUNTER — TELEPHONE (OUTPATIENT)
Dept: OBGYN CLINIC | Facility: CLINIC | Age: 33
End: 2018-02-09

## 2018-02-09 DIAGNOSIS — R82.71 BACTERIURIA: Primary | ICD-10-CM

## 2018-02-09 DIAGNOSIS — N30.00 ACUTE CYSTITIS WITHOUT HEMATURIA: Primary | ICD-10-CM

## 2018-02-09 RX ORDER — CIPROFLOXACIN 250 MG/1
250 TABLET, FILM COATED ORAL EVERY 12 HOURS SCHEDULED
Qty: 14 TABLET | Refills: 0 | Status: SHIPPED | OUTPATIENT
Start: 2018-02-09 | End: 2018-02-16

## 2018-02-09 RX ORDER — CIPROFLOXACIN 250 MG/1
500 TABLET, FILM COATED ORAL EVERY 12 HOURS SCHEDULED
Qty: 14 TABLET | Refills: 0 | Status: SHIPPED | OUTPATIENT
Start: 2018-02-09 | End: 2018-02-09 | Stop reason: CLARIF

## 2018-02-09 NOTE — TELEPHONE ENCOUNTER
----- Message from Mlaorie Stephens, Artem[address] sent at 2/9/2018  3:25 PM EST -----  Urine culture with e   Coli  Cipro advised given sensitivity results   I will eRx this for her   Please advise pushing fluids and review sx of attila to report

## 2018-02-12 NOTE — TELEPHONE ENCOUNTER
Cipro is ok for a short course if trying to conceive - this is cat C without evidence of teratogenicity  If she prefers a cat B med I am happy to rx Keflex instead  If she desires this please eRx Keflex 500mg PO q12 hr x7d, disp #14, no refills

## 2018-02-12 NOTE — TELEPHONE ENCOUNTER
Patient returning call  Has not started Cipro as of yet  Has been trying to conceive  Asking if safe in pregnancy  Routed to Donna Flannery to advise

## 2018-04-23 ENCOUNTER — TELEPHONE (OUTPATIENT)
Dept: OBGYN CLINIC | Facility: CLINIC | Age: 33
End: 2018-04-23

## 2018-04-23 NOTE — TELEPHONE ENCOUNTER
Pt is requesting an ovl with Dr Althea Gilmore if possible- her lmp was 3/18 and the doctor helped her through a recent miscarriage

## 2018-04-24 ENCOUNTER — TELEPHONE (OUTPATIENT)
Dept: OBGYN CLINIC | Facility: CLINIC | Age: 33
End: 2018-04-24

## 2018-04-24 NOTE — TELEPHONE ENCOUNTER
Dr Janice Awad is on vacation the week she needs an appointment  She can schedule her Pn1 with Dr Janice Awad if she wants

## 2018-05-17 ENCOUNTER — OFFICE VISIT (OUTPATIENT)
Dept: OBGYN CLINIC | Facility: MEDICAL CENTER | Age: 33
End: 2018-05-17
Payer: COMMERCIAL

## 2018-05-17 VITALS
HEIGHT: 63 IN | DIASTOLIC BLOOD PRESSURE: 74 MMHG | BODY MASS INDEX: 25.16 KG/M2 | WEIGHT: 142 LBS | SYSTOLIC BLOOD PRESSURE: 120 MMHG

## 2018-05-17 DIAGNOSIS — N91.2 AMENORRHEA: Primary | ICD-10-CM

## 2018-05-17 PROBLEM — N39.0 RECURRENT UTI: Status: ACTIVE | Noted: 2018-01-10

## 2018-05-17 PROBLEM — N92.0 SPOTTING: Status: ACTIVE | Noted: 2018-01-10

## 2018-05-17 PROBLEM — N91.1 AMENORRHEA, SECONDARY: Status: ACTIVE | Noted: 2017-10-17

## 2018-05-17 PROBLEM — N89.8 VAGINAL DISCHARGE: Status: ACTIVE | Noted: 2018-01-10

## 2018-05-17 PROBLEM — O03.9 ABORTION, SPONTANEOUS COMPLETE: Status: ACTIVE | Noted: 2017-11-01

## 2018-05-17 PROCEDURE — 76817 TRANSVAGINAL US OBSTETRIC: CPT | Performed by: NURSE PRACTITIONER

## 2018-05-17 PROCEDURE — 99213 OFFICE O/P EST LOW 20 MIN: CPT | Performed by: NURSE PRACTITIONER

## 2018-05-18 PROBLEM — O03.9 ABORTION, SPONTANEOUS COMPLETE: Status: RESOLVED | Noted: 2017-11-01 | Resolved: 2018-05-18

## 2018-05-18 PROBLEM — N89.8 VAGINAL DISCHARGE: Status: RESOLVED | Noted: 2018-01-10 | Resolved: 2018-05-18

## 2018-05-18 PROBLEM — N91.1 AMENORRHEA, SECONDARY: Status: RESOLVED | Noted: 2017-10-17 | Resolved: 2018-05-18

## 2018-05-18 PROBLEM — N92.0 SPOTTING: Status: RESOLVED | Noted: 2018-01-10 | Resolved: 2018-05-18

## 2018-05-18 NOTE — PROGRESS NOTES
EARLY PREGNANCY ULTRASOUND    SUBJECTIVE    HPI: Tavo Quesada is a 28 y o   female here today for early pregnancy ultrasound  Accompanied by partner  Patient's last menstrual period was 2018    Menses are regular  This pregnancy was planned  SAB in 2017   section in  for ? CPD at a hospital in [place], [place]  Wants to   Not Taking a prenatal vitamin  No Known Allergies  No current outpatient prescriptions on file  OBJECTIVE  Vitals:    18 1641   BP: 120/74   BP Location: Left arm   Patient Position: Sitting   Weight: 64 4 kg (142 lb)   Height: 5' 3" (1 6 m)         Early OB Ultrasound Procedure Note: Transvaginal US    Technician: Study performed by the interpreting NP    Indications:  Early gestation, dating & viability    Procedure Details: Limited ultrasound examination  The entire study was done at settings of 6 0 to 8 0 MHz  Findings:  A gestational sac is Present  A yolk sac is Present  The crown-rump length measures 1 89 centimeters and calculates to an estimated gestational age of 11 weeks, 3 days  Embryonic cardiac activity is seen at a rate of 176 b/min  The cul-de-sac has no fluid  The uterus is anteverted in position  There is a corpus luteum on the left ovary  The right ovary appears normal  The cervix appears normal         ASSESSMENT  Viable, finley intrauterine pregnancy  8 weeks 4 days with a calculated SENA of 18 based on LMP      PLAN  1 - Discussed referral to [facility] to review genetic screening options for fetus  2 - Return to office for OB interview and PN-1 visit          All questions were answered & Junior Molina expressed understanding      [facility]

## 2018-06-01 ENCOUNTER — TELEPHONE (OUTPATIENT)
Dept: OBGYN CLINIC | Facility: CLINIC | Age: 33
End: 2018-06-01

## 2018-06-01 DIAGNOSIS — Z87.59 HISTORY OF MISCARRIAGE: Primary | ICD-10-CM

## 2018-06-01 NOTE — TELEPHONE ENCOUNTER
Patient anxious about pregnancy  Had spont  Ab in October  States she is having some menstrual like cramping which she does not need to take medication for and increased in white discharge  Denies vaginal bleeding  States pregnancy symptoms went away which is what happened back in October  Would like to know if she could have bhcg or make an appt for a problem visit prior to next scheduled visit  Will route to provider to advise

## 2018-06-01 NOTE — TELEPHONE ENCOUNTER
Spoke with Shin PEACOCK to order bhcg  Patient aware  Advised to call with any bleeding or pain/to E R  On weekend  Patient verbalizes understanding

## 2018-06-01 NOTE — TELEPHONE ENCOUNTER
Pt is currently pregnant lmp 3/18  Early u/s was on 5/17 history of miscarriage in oct  Pt states she had some cramping last night  Also lots of white thick discharge since the start of her pregnancy  Pt explained she feels the same way as she felt in October  She feels like "nothing is there" pt did have lots of symptoms sore breasts,nauseous, bloating now all of those symptoms went away   Pt has not had any spotting/bleeding please advise @ [phone]

## 2018-06-05 ENCOUNTER — APPOINTMENT (OUTPATIENT)
Dept: LAB | Facility: CLINIC | Age: 33
End: 2018-06-05
Payer: COMMERCIAL

## 2018-06-05 DIAGNOSIS — Z87.59 HISTORY OF MISCARRIAGE: ICD-10-CM

## 2018-06-05 LAB — B-HCG SERPL-ACNC: ABNORMAL MIU/ML

## 2018-06-05 PROCEDURE — 84702 CHORIONIC GONADOTROPIN TEST: CPT

## 2018-06-05 PROCEDURE — 36415 COLL VENOUS BLD VENIPUNCTURE: CPT

## 2018-06-06 ENCOUNTER — TELEPHONE (OUTPATIENT)
Dept: OBGYN CLINIC | Facility: CLINIC | Age: 33
End: 2018-06-06

## 2018-06-11 ENCOUNTER — TELEPHONE (OUTPATIENT)
Dept: OBGYN CLINIC | Facility: CLINIC | Age: 33
End: 2018-06-11

## 2018-06-11 DIAGNOSIS — G43.709 CHRONIC MIGRAINE WITHOUT AURA WITHOUT STATUS MIGRAINOSUS, NOT INTRACTABLE: Primary | ICD-10-CM

## 2018-06-11 NOTE — TELEPHONE ENCOUNTER
Pt called office today, left voicemail message  Pt's SENA is 12/23/18, GA 12 wks + 1 day  Pt states she has been having a migraine headache for 2 days now  Pt further states she wants to know what she medication she can take for her migraine  Pt can be reached @ [phone]

## 2018-06-11 NOTE — TELEPHONE ENCOUNTER
Pt 12 wks pregnant  Has hx of migraines and now cannot take excedrin  HA has been there 3 days  Has ice on forehead and heat on neck  Await rx from ED  Per ED , I called in fioricet, 50/340, # 20 , sig 1 q 4 hrs prn [facility]   Pt aware if ha no better - to ER

## 2018-06-14 ENCOUNTER — INITIAL PRENATAL (OUTPATIENT)
Dept: OBGYN CLINIC | Facility: CLINIC | Age: 33
End: 2018-06-14

## 2018-06-14 ENCOUNTER — ROUTINE PRENATAL (OUTPATIENT)
Dept: OBGYN CLINIC | Facility: CLINIC | Age: 33
End: 2018-06-14

## 2018-06-14 VITALS
HEIGHT: 63 IN | BODY MASS INDEX: 25.3 KG/M2 | DIASTOLIC BLOOD PRESSURE: 60 MMHG | WEIGHT: 142.8 LBS | SYSTOLIC BLOOD PRESSURE: 90 MMHG

## 2018-06-14 DIAGNOSIS — Z34.82 MULTIGRAVIDA IN SECOND TRIMESTER: ICD-10-CM

## 2018-06-14 DIAGNOSIS — Z98.891 HISTORY OF C-SECTION: ICD-10-CM

## 2018-06-14 DIAGNOSIS — Z3A.12 12 WEEKS GESTATION OF PREGNANCY: Primary | ICD-10-CM

## 2018-06-14 DIAGNOSIS — Z34.81 PRENATAL CARE, SUBSEQUENT PREGNANCY, FIRST TRIMESTER: Primary | ICD-10-CM

## 2018-06-14 DIAGNOSIS — R87.610 PAP SMEAR ABNORMALITY OF CERVIX WITH ASCUS FAVORING BENIGN: ICD-10-CM

## 2018-06-14 DIAGNOSIS — G43.009 MIGRAINE WITHOUT AURA AND WITHOUT STATUS MIGRAINOSUS, NOT INTRACTABLE: ICD-10-CM

## 2018-06-14 PROBLEM — O03.9 MISCARRIAGE: Status: RESOLVED | Noted: 2018-06-14 | Resolved: 2018-06-14

## 2018-06-14 PROBLEM — G43.909 MIGRAINE: Status: ACTIVE | Noted: 2018-06-14

## 2018-06-14 PROCEDURE — 87624 HPV HI-RISK TYP POOLED RSLT: CPT | Performed by: PHYSICIAN ASSISTANT

## 2018-06-14 PROCEDURE — PNV: Performed by: PHYSICIAN ASSISTANT

## 2018-06-14 PROCEDURE — 87591 N.GONORRHOEAE DNA AMP PROB: CPT | Performed by: PHYSICIAN ASSISTANT

## 2018-06-14 PROCEDURE — OBC: Performed by: NURSE PRACTITIONER

## 2018-06-14 PROCEDURE — 87491 CHLMYD TRACH DNA AMP PROBE: CPT | Performed by: PHYSICIAN ASSISTANT

## 2018-06-14 PROCEDURE — G0145 SCR C/V CYTO,THINLAYER,RESCR: HCPCS | Performed by: PHYSICIAN ASSISTANT

## 2018-06-14 NOTE — ASSESSMENT & PLAN NOTE
States  was done "for no reason " says she was complete and pushing, doctor said "baby isn't coming down" and recommended   She requested epidural and reattempt at pushing but doctor declined  That baby was 7 lbs  13 oz  This is the reason she has left their practice and came to us  Pt hopes for TOLAC

## 2018-06-14 NOTE — PROGRESS NOTES
Problem List Items Addressed This Visit     Pap smear abnormality of cervix with ASCUS favoring benign     Pap and HPV today           Multigravida in second trimester     Feels well  Going for genetic testing next week  Will get first OB labs with her genetic testing           History of      States  was done "for no reason " says she was complete and pushing, doctor said "baby isn't coming down" and recommended   She requested epidural and reattempt at pushing but doctor declined  That baby was 7 lbs  13 oz  This is the reason she has left their practice and came to us  Pt hopes for TOLAC  Migraine     History of migraines  Recently used one Fioricet with complete resolution of her migraine  She will let us know if these worsen or if Fioricet is not helping              Other Visit Diagnoses     12 weeks gestation of pregnancy    -  Primary    Relevant Orders    Liquid-based pap, screening    Chlamydia/GC amplified DNA by PCR

## 2018-06-14 NOTE — ASSESSMENT & PLAN NOTE
History of migraines  Recently used one Fioricet with complete resolution of her migraine  She will let us know if these worsen or if Fioricet is not helping

## 2018-06-14 NOTE — PROGRESS NOTES
Pn interview completed  Pt oriented to office/outpt labs  PN1 visit scheduled for today  Pn carlitodwk ordered  Referrals for seq screen & level  2 20 wks u s  Scheduled  Pt to consider CF testing  -informed of NPO status

## 2018-06-16 RX ORDER — BUTALBITAL, ACETAMINOPHEN AND CAFFEINE 50; 325; 40 MG/1; MG/1; MG/1
TABLET ORAL
Qty: 20 TABLET | Refills: 0 | OUTPATIENT
Start: 2018-06-16 | End: 2018-12-29 | Stop reason: HOSPADM

## 2018-06-18 LAB
CHLAMYDIA DNA CVX QL NAA+PROBE: NORMAL
N GONORRHOEA DNA GENITAL QL NAA+PROBE: NORMAL

## 2018-06-20 LAB
HPV RRNA GENITAL QL NAA+PROBE: NORMAL
LAB AP GYN PRIMARY INTERPRETATION: NORMAL
Lab: NORMAL

## 2018-06-21 ENCOUNTER — ROUTINE PRENATAL (OUTPATIENT)
Dept: PERINATAL CARE | Facility: CLINIC | Age: 33
End: 2018-06-21
Payer: COMMERCIAL

## 2018-06-21 VITALS
HEART RATE: 80 BPM | SYSTOLIC BLOOD PRESSURE: 110 MMHG | WEIGHT: 144.6 LBS | DIASTOLIC BLOOD PRESSURE: 67 MMHG | HEIGHT: 63 IN | BODY MASS INDEX: 25.62 KG/M2

## 2018-06-21 DIAGNOSIS — Z3A.13 13 WEEKS GESTATION OF PREGNANCY: ICD-10-CM

## 2018-06-21 DIAGNOSIS — Z36.82 ENCOUNTER FOR ANTENATAL SCREENING FOR NUCHAL TRANSLUCENCY: Primary | ICD-10-CM

## 2018-06-21 PROCEDURE — 76813 OB US NUCHAL MEAS 1 GEST: CPT | Performed by: OBSTETRICS & GYNECOLOGY

## 2018-06-21 PROCEDURE — 76801 OB US < 14 WKS SINGLE FETUS: CPT | Performed by: OBSTETRICS & GYNECOLOGY

## 2018-06-21 NOTE — PROGRESS NOTES
[address]: Ms Rudolph Orosco was seen today at 13w4d for nuchal translucency ultrasound  See ultrasound report under "OB Procedures" tab  Please don't hesitate to contact our office with any concerns or questions    Tyrel Winkler MD

## 2018-06-21 NOTE — PATIENT INSTRUCTIONS
Thank you for choosing [facility] for your  care today  If you have any questions about your ultrasound or care, please do not hesitate to contact us or your primary obstetrician

## 2018-06-22 ENCOUNTER — APPOINTMENT (OUTPATIENT)
Dept: LAB | Facility: CLINIC | Age: 33
End: 2018-06-22
Payer: COMMERCIAL

## 2018-06-22 DIAGNOSIS — Z34.81 PRENATAL CARE, SUBSEQUENT PREGNANCY, FIRST TRIMESTER: ICD-10-CM

## 2018-06-22 LAB
ABO GROUP BLD: NORMAL
BACTERIA UR QL AUTO: ABNORMAL /HPF
BASOPHILS # BLD AUTO: 0.08 THOUSANDS/ΜL (ref 0–0.1)
BASOPHILS NFR BLD AUTO: 1 % (ref 0–1)
BILIRUB UR QL STRIP: NEGATIVE
BLD GP AB SCN SERPL QL: NEGATIVE
CLARITY UR: ABNORMAL
COLOR UR: ABNORMAL
EOSINOPHIL # BLD AUTO: 0.11 THOUSAND/ΜL (ref 0–0.61)
EOSINOPHIL NFR BLD AUTO: 1 % (ref 0–6)
ERYTHROCYTE [DISTWIDTH] IN BLOOD BY AUTOMATED COUNT: 13 % (ref 11.6–15.1)
GLUCOSE UR STRIP-MCNC: NEGATIVE MG/DL
HBV SURFACE AG SER QL: NORMAL
HCT VFR BLD AUTO: 35.4 % (ref 34.8–46.1)
HGB BLD-MCNC: 11.7 G/DL (ref 11.5–15.4)
HGB UR QL STRIP.AUTO: ABNORMAL
IMM GRANULOCYTES # BLD AUTO: 0.04 THOUSAND/UL (ref 0–0.2)
IMM GRANULOCYTES NFR BLD AUTO: 0 % (ref 0–2)
KETONES UR STRIP-MCNC: NEGATIVE MG/DL
LEUKOCYTE ESTERASE UR QL STRIP: ABNORMAL
LYMPHOCYTES # BLD AUTO: 2.39 THOUSANDS/ΜL (ref 0.6–4.47)
LYMPHOCYTES NFR BLD AUTO: 23 % (ref 14–44)
MCH RBC QN AUTO: 30.1 PG (ref 26.8–34.3)
MCHC RBC AUTO-ENTMCNC: 33.1 G/DL (ref 31.4–37.4)
MCV RBC AUTO: 91 FL (ref 82–98)
MONOCYTES # BLD AUTO: 0.71 THOUSAND/ΜL (ref 0.17–1.22)
MONOCYTES NFR BLD AUTO: 7 % (ref 4–12)
NEUTROPHILS # BLD AUTO: 7.12 THOUSANDS/ΜL (ref 1.85–7.62)
NEUTS SEG NFR BLD AUTO: 68 % (ref 43–75)
NITRITE UR QL STRIP: NEGATIVE
NON-SQ EPI CELLS URNS QL MICRO: ABNORMAL /HPF
NRBC BLD AUTO-RTO: 0 /100 WBCS
PH UR STRIP.AUTO: 5.5 [PH] (ref 4.5–8)
PLATELET # BLD AUTO: 301 THOUSANDS/UL (ref 149–390)
PMV BLD AUTO: 10.2 FL (ref 8.9–12.7)
PROT UR STRIP-MCNC: NEGATIVE MG/DL
RBC # BLD AUTO: 3.89 MILLION/UL (ref 3.81–5.12)
RBC #/AREA URNS AUTO: ABNORMAL /HPF
RH BLD: POSITIVE
RUBV IGG SERPL IA-ACNC: 130.9 IU/ML
SP GR UR STRIP.AUTO: 1.02 (ref 1–1.03)
SPECIMEN EXPIRATION DATE: NORMAL
UROBILINOGEN UR QL STRIP.AUTO: 0.2 E.U./DL
WBC # BLD AUTO: 10.45 THOUSAND/UL (ref 4.31–10.16)
WBC #/AREA URNS AUTO: ABNORMAL /HPF

## 2018-06-22 PROCEDURE — 87086 URINE CULTURE/COLONY COUNT: CPT

## 2018-06-22 PROCEDURE — 81001 URINALYSIS AUTO W/SCOPE: CPT

## 2018-06-22 PROCEDURE — 36415 COLL VENOUS BLD VENIPUNCTURE: CPT

## 2018-06-22 PROCEDURE — 80081 OBSTETRIC PANEL INC HIV TSTG: CPT

## 2018-06-24 LAB — BACTERIA UR CULT: NORMAL

## 2018-06-25 LAB
HIV 1+2 AB+HIV1 P24 AG SERPL QL IA: NORMAL
RPR SER QL: NORMAL

## 2018-06-27 LAB
# FETUSES US: 1
AGE: NORMAL
B-HCG ADJ MOM SERPL: 0.66
B-HCG SERPL-ACNC: 49.9 IU/ML
COLLECT DATE: NORMAL
CURRENT SMOKER: NO
DONATED EGG PATIENT QL: NO
FET CRL US.MEAS: 78 MM
FET CRL US.MEAS: NORMAL MM
FET NUCHAL FOLD MOM THICKNESS US.MEAS: 0.82
FET NUCHAL FOLD THICKNESS US.MEAS: 1.4 MM
FET NUCHAL FOLD THICKNESS US.MEAS: NORMAL MM
FET TS 21 RISK FROM MAT AGE: NORMAL
GA CLIN EST: 13.9 WK
GA METHOD: NORMAL
HX OF NTD NARR: NO
HX OF TRISOMY 21 NARR: NO
IDDM PATIENT QL: NO
INTEGRATED SCN PATIENT-IMP: NORMAL
PAPP-A MOM SERPL: 0.64
PAPP-A SERPL-MCNC: 894.4 NG/ML
PHYSICIAN NPI: NORMAL
SL AMB NASAL BONE: PRESENT
SL AMB NTQR LOCATION ID#: NORMAL
SL AMB NTQR READING PHYS ID#: NORMAL
SL AMB REFERRING PHYSICIAN NAME: NORMAL
SL AMB REFERRING PHYSICIAN PHONE: NORMAL
SL AMB REPEAT SPECIMEN: NO
SL AMB TWIN B NASAL BONE: NORMAL
SONOGRAPHER NAME: NORMAL
SONOGRAPHER: NORMAL
SONOGRAPHER: NORMAL
TS 18 RISK FETUS: NORMAL
TS 21 RISK FETUS: NORMAL
US DATE: NORMAL
US FETUSES STUDY [IMP]: NORMAL

## 2018-06-29 ENCOUNTER — TELEPHONE (OUTPATIENT)
Dept: PERINATAL CARE | Facility: CLINIC | Age: 33
End: 2018-06-29

## 2018-06-29 NOTE — TELEPHONE ENCOUNTER
----- Message from Carito Alvarez MD sent at 2018 11:11 AM EDT -----  Jeremy Farooq  RN staff, I've reviewed this Sequential Part 1 result which is normal, can you call her regarding this result? Thank you    Carito Alvarez MD

## 2018-07-12 ENCOUNTER — ROUTINE PRENATAL (OUTPATIENT)
Dept: OBGYN CLINIC | Facility: MEDICAL CENTER | Age: 33
End: 2018-07-12

## 2018-07-12 VITALS — SYSTOLIC BLOOD PRESSURE: 98 MMHG | DIASTOLIC BLOOD PRESSURE: 60 MMHG | WEIGHT: 148 LBS | BODY MASS INDEX: 26.22 KG/M2

## 2018-07-12 DIAGNOSIS — Z34.82 SUPERVISION OF NORMAL IUP (INTRAUTERINE PREGNANCY) IN MULTIGRAVIDA, SECOND TRIMESTER: Primary | ICD-10-CM

## 2018-07-12 PROCEDURE — PNV: Performed by: NURSE PRACTITIONER

## 2018-07-12 NOTE — PROGRESS NOTES
Doing well  No complaints  Denies LOF, VB, cramping  L2 ultrasound is set up  Return in 4 weeks      Harvey Merino

## 2018-08-02 ENCOUNTER — ROUTINE PRENATAL (OUTPATIENT)
Dept: PERINATAL CARE | Facility: MEDICAL CENTER | Age: 33
End: 2018-08-02
Payer: COMMERCIAL

## 2018-08-02 VITALS
SYSTOLIC BLOOD PRESSURE: 103 MMHG | DIASTOLIC BLOOD PRESSURE: 62 MMHG | BODY MASS INDEX: 26.58 KG/M2 | HEART RATE: 85 BPM | WEIGHT: 150 LBS | HEIGHT: 63 IN

## 2018-08-02 DIAGNOSIS — Z3A.19 19 WEEKS GESTATION OF PREGNANCY: ICD-10-CM

## 2018-08-02 DIAGNOSIS — Z36.86 ENCOUNTER FOR ANTENATAL SCREENING FOR CERVICAL LENGTH: ICD-10-CM

## 2018-08-02 DIAGNOSIS — O34.219 PREGNANCY WITH HISTORY OF CESAREAN SECTION, ANTEPARTUM: Primary | ICD-10-CM

## 2018-08-02 PROCEDURE — 76811 OB US DETAILED SNGL FETUS: CPT | Performed by: OBSTETRICS & GYNECOLOGY

## 2018-08-02 PROCEDURE — 76817 TRANSVAGINAL US OBSTETRIC: CPT | Performed by: OBSTETRICS & GYNECOLOGY

## 2018-08-02 NOTE — PROGRESS NOTES
A transvaginal ultrasound was performed  Sonographer note on use of High Level Disinfection Process (Trophon) for transvaginal probe# 2 used, serial [id number]    Ishaan Rg

## 2018-08-07 ENCOUNTER — ROUTINE PRENATAL (OUTPATIENT)
Dept: OBGYN CLINIC | Facility: MEDICAL CENTER | Age: 33
End: 2018-08-07

## 2018-08-07 VITALS — SYSTOLIC BLOOD PRESSURE: 118 MMHG | DIASTOLIC BLOOD PRESSURE: 60 MMHG | WEIGHT: 148.2 LBS | BODY MASS INDEX: 26.25 KG/M2

## 2018-08-07 DIAGNOSIS — Z34.82 MULTIGRAVIDA IN SECOND TRIMESTER: Primary | ICD-10-CM

## 2018-08-07 PROCEDURE — PNV: Performed by: PHYSICIAN ASSISTANT

## 2018-08-07 NOTE — PROGRESS NOTES
Patient w/o OB complaints, has head cold, advised can take robitussin plain and mucinex plain  (+) good fetal movement, denies any bleeding or abdominal pain  Level 2 WNL      Problem List Items Addressed This Visit     Multigravida in second trimester - Primary     RTO 4 wks  Reviewed reasons to call

## 2018-09-06 ENCOUNTER — ROUTINE PRENATAL (OUTPATIENT)
Dept: OBGYN CLINIC | Facility: MEDICAL CENTER | Age: 33
End: 2018-09-06

## 2018-09-06 VITALS
HEIGHT: 63 IN | WEIGHT: 153 LBS | DIASTOLIC BLOOD PRESSURE: 68 MMHG | BODY MASS INDEX: 27.11 KG/M2 | RESPIRATION RATE: 14 BRPM | SYSTOLIC BLOOD PRESSURE: 110 MMHG

## 2018-09-06 DIAGNOSIS — Z3A.25 25 WEEKS GESTATION OF PREGNANCY: Primary | ICD-10-CM

## 2018-09-06 DIAGNOSIS — O34.219 PREGNANCY WITH HISTORY OF CESAREAN SECTION, ANTEPARTUM: ICD-10-CM

## 2018-09-06 PROCEDURE — PNV: Performed by: OBSTETRICS & GYNECOLOGY

## 2018-09-06 NOTE — PROGRESS NOTES
Patient is a 69-year-old female, P1, at 24 weeks and 4 days here for routine prenatal visit without complaint  Pregnancy is complicated by a prior  delivery  Review of systems is positive for fetal movement negative for loss of fluid vaginal bleeding or regular contractions

## 2018-09-20 ENCOUNTER — APPOINTMENT (OUTPATIENT)
Dept: LAB | Facility: CLINIC | Age: 33
End: 2018-09-20
Payer: COMMERCIAL

## 2018-09-20 ENCOUNTER — TRANSCRIBE ORDERS (OUTPATIENT)
Dept: ADMINISTRATIVE | Facility: HOSPITAL | Age: 33
End: 2018-09-20

## 2018-09-20 DIAGNOSIS — Z3A.25 25 WEEKS GESTATION OF PREGNANCY: ICD-10-CM

## 2018-09-20 LAB — GLUCOSE 1H P 50 G GLC PO SERPL-MCNC: 126 MG/DL

## 2018-09-20 PROCEDURE — 86592 SYPHILIS TEST NON-TREP QUAL: CPT

## 2018-09-20 PROCEDURE — 85007 BL SMEAR W/DIFF WBC COUNT: CPT

## 2018-09-20 PROCEDURE — 85027 COMPLETE CBC AUTOMATED: CPT

## 2018-09-20 PROCEDURE — 82950 GLUCOSE TEST: CPT

## 2018-09-20 PROCEDURE — 36415 COLL VENOUS BLD VENIPUNCTURE: CPT

## 2018-09-21 LAB
ANISOCYTOSIS BLD QL SMEAR: PRESENT
BASOPHILS # BLD MANUAL: 0 THOUSAND/UL (ref 0–0.1)
BASOPHILS NFR MAR MANUAL: 0 % (ref 0–1)
EOSINOPHIL # BLD MANUAL: 0 THOUSAND/UL (ref 0–0.4)
EOSINOPHIL NFR BLD MANUAL: 0 % (ref 0–6)
ERYTHROCYTE [DISTWIDTH] IN BLOOD BY AUTOMATED COUNT: 25.2 % (ref 11.6–15.1)
HCT VFR BLD AUTO: 38 % (ref 34.8–46.1)
HGB BLD-MCNC: 11.1 G/DL (ref 11.5–15.4)
LYMPHOCYTES # BLD AUTO: 2.39 THOUSAND/UL (ref 0.6–4.47)
LYMPHOCYTES # BLD AUTO: 24 % (ref 14–44)
MACROCYTES BLD QL AUTO: PRESENT
MCH RBC QN AUTO: 31.1 PG (ref 26.8–34.3)
MCHC RBC AUTO-ENTMCNC: 29.2 G/DL (ref 31.4–37.4)
MCV RBC AUTO: 106 FL (ref 82–98)
MONOCYTES # BLD AUTO: 0.8 THOUSAND/UL (ref 0–1.22)
MONOCYTES NFR BLD: 8 % (ref 4–12)
NEUTROPHILS # BLD MANUAL: 6.78 THOUSAND/UL (ref 1.85–7.62)
NEUTS SEG NFR BLD AUTO: 68 % (ref 43–75)
NRBC BLD AUTO-RTO: 1 /100 WBCS
PLATELET # BLD AUTO: 323 THOUSANDS/UL (ref 149–390)
PLATELET BLD QL SMEAR: ADEQUATE
RBC # BLD AUTO: 3.57 MILLION/UL (ref 3.81–5.12)
RBC MORPH BLD: PRESENT
RPR SER QL: NORMAL
TOTAL CELLS COUNTED SPEC: 100
WBC # BLD AUTO: 9.97 THOUSAND/UL (ref 4.31–10.16)

## 2018-10-01 ENCOUNTER — ROUTINE PRENATAL (OUTPATIENT)
Dept: OBGYN CLINIC | Facility: MEDICAL CENTER | Age: 33
End: 2018-10-01
Payer: COMMERCIAL

## 2018-10-01 VITALS — BODY MASS INDEX: 27.81 KG/M2 | WEIGHT: 157 LBS | DIASTOLIC BLOOD PRESSURE: 50 MMHG | SYSTOLIC BLOOD PRESSURE: 110 MMHG

## 2018-10-01 DIAGNOSIS — O34.219 PREGNANCY WITH HISTORY OF CESAREAN SECTION, ANTEPARTUM: Primary | ICD-10-CM

## 2018-10-01 DIAGNOSIS — Z23 NEED FOR TDAP VACCINATION: ICD-10-CM

## 2018-10-01 DIAGNOSIS — Z34.83 MULTIGRAVIDA IN THIRD TRIMESTER: ICD-10-CM

## 2018-10-01 PROCEDURE — 90715 TDAP VACCINE 7 YRS/> IM: CPT

## 2018-10-01 PROCEDURE — PNV: Performed by: OBSTETRICS & GYNECOLOGY

## 2018-10-01 PROCEDURE — 90471 IMMUNIZATION ADMIN: CPT

## 2018-10-01 NOTE — PROGRESS NOTES
Problem List Items Addressed This Visit        Other    Multigravida in third trimester     Arlyce Simmonds is doing well  Some general achiness in morning, has to use heating pad neck/back  Does better as day goes on  Baby moving well, we discussed kick counts and I provided the instruction sheet for them  28 week labs normal   O+  TDAP today, flu shot next week  Pregnancy with history of  section, antepartum - Primary     Patient plans TOLAC              Other Visit Diagnoses     Need for Tdap vaccination        Relevant Orders    TDAP VACCINE GREATER THAN OR EQUAL TO 6YO IM (Completed)

## 2018-10-01 NOTE — ASSESSMENT & PLAN NOTE
Ephriam Tanya is doing well  Some general achiness in morning, has to use heating pad neck/back  Does better as day goes on  Baby moving well, we discussed kick counts and I provided the instruction sheet for them  28 week labs normal   O+  TDAP today, flu shot next week

## 2018-10-01 NOTE — PROGRESS NOTES
Over the last 2-3 days she has needed her Fioricet for her migraines  She hasn't been feeling the best having some body aches  TDAP given today  Discuss 28 week labs  Patient getting flu shot at work next week

## 2018-10-05 ENCOUNTER — TELEPHONE (OUTPATIENT)
Dept: OBGYN CLINIC | Facility: CLINIC | Age: 33
End: 2018-10-05

## 2018-10-05 DIAGNOSIS — G43.009 MIGRAINE WITHOUT AURA AND WITHOUT STATUS MIGRAINOSUS, NOT INTRACTABLE: Primary | ICD-10-CM

## 2018-10-05 RX ORDER — SUMATRIPTAN 100 MG/1
100 TABLET, FILM COATED ORAL ONCE AS NEEDED
Qty: 2 TABLET | Refills: 3 | Status: SHIPPED | OUTPATIENT
Start: 2018-10-05 | End: 2018-11-23 | Stop reason: ALTCHOICE

## 2018-10-05 RX ORDER — METOCLOPRAMIDE 10 MG/1
10 TABLET ORAL 4 TIMES DAILY
Qty: 5 TABLET | Refills: 2 | Status: SHIPPED | OUTPATIENT
Start: 2018-10-05 | End: 2018-10-15 | Stop reason: ALTCHOICE

## 2018-10-05 NOTE — TELEPHONE ENCOUNTER
Patient does not have any relief with Fioricet  Vomitted today  Prior to pregnancy Excedrin and Maxalt were effective  Tiger text sent to Dr Rosalio Cho to advise

## 2018-10-05 NOTE — TELEPHONE ENCOUNTER
Spoke with Dr Fei Carroll  Confirmed that patient is able to tolerate po medication  Vomitting is from migraine  Per KTM, new medication sent to pharmacy as well as medication for nausea  If no relief with these meds patient to go to triage  Patient is aware  Thankful

## 2018-10-05 NOTE — TELEPHONE ENCOUNTER
Pt called - 28wks - was rx'd migraine med - they not helping - she has been suffering badly for the past 3-4 days    [phone]

## 2018-10-15 ENCOUNTER — ROUTINE PRENATAL (OUTPATIENT)
Dept: OBGYN CLINIC | Facility: MEDICAL CENTER | Age: 33
End: 2018-10-15

## 2018-10-15 VITALS — SYSTOLIC BLOOD PRESSURE: 110 MMHG | BODY MASS INDEX: 27.99 KG/M2 | DIASTOLIC BLOOD PRESSURE: 70 MMHG | WEIGHT: 158 LBS

## 2018-10-15 DIAGNOSIS — Z34.93 THIRD TRIMESTER PREGNANCY: Primary | ICD-10-CM

## 2018-10-15 DIAGNOSIS — Z34.83 MULTIGRAVIDA IN THIRD TRIMESTER: ICD-10-CM

## 2018-10-15 DIAGNOSIS — O34.219 PREGNANCY WITH HISTORY OF CESAREAN SECTION, ANTEPARTUM: ICD-10-CM

## 2018-10-15 PROCEDURE — PNV: Performed by: PHYSICIAN ASSISTANT

## 2018-10-15 NOTE — PROGRESS NOTES
Problem List Items Addressed This Visit     Multigravida in third trimester     Feels well overall, some fatigue  It's a boy - no name yet - good fetal movement  Passed 28 week labs  Received TDAP, will get flu shot at work         Pregnancy with history of  section, antepartum     For TOLAC           Other Visit Diagnoses     Third trimester pregnancy    -  Primary

## 2018-10-15 NOTE — ASSESSMENT & PLAN NOTE
Feels well overall, some fatigue  It's a boy - no name yet - good fetal movement  Passed 28 week labs  Received TDAP, will get flu shot at work

## 2018-10-30 ENCOUNTER — ROUTINE PRENATAL (OUTPATIENT)
Dept: OBGYN CLINIC | Facility: MEDICAL CENTER | Age: 33
End: 2018-10-30

## 2018-10-30 VITALS — DIASTOLIC BLOOD PRESSURE: 60 MMHG | WEIGHT: 163.2 LBS | SYSTOLIC BLOOD PRESSURE: 118 MMHG | BODY MASS INDEX: 28.91 KG/M2

## 2018-10-30 DIAGNOSIS — Z34.83 MULTIGRAVIDA IN THIRD TRIMESTER: Primary | ICD-10-CM

## 2018-10-30 PROCEDURE — PNV: Performed by: PHYSICIAN ASSISTANT

## 2018-10-30 NOTE — PROGRESS NOTES
Patient w/o complaints  (+) good fetal movement, denies any bleeding, fluid leakage  Some sporatic BH ctx  Patient still interested in , aware of risks      Problem List Items Addressed This Visit     Multigravida in third trimester - Primary     RTO 2 wks  Flu shot at work  Reviewed PTL precautions, fetal kick counts and reasons to call

## 2018-11-16 ENCOUNTER — ROUTINE PRENATAL (OUTPATIENT)
Dept: OBGYN CLINIC | Age: 33
End: 2018-11-16

## 2018-11-16 VITALS — DIASTOLIC BLOOD PRESSURE: 60 MMHG | WEIGHT: 165 LBS | SYSTOLIC BLOOD PRESSURE: 112 MMHG | BODY MASS INDEX: 29.23 KG/M2

## 2018-11-16 DIAGNOSIS — Z3A.34 34 WEEKS GESTATION OF PREGNANCY: ICD-10-CM

## 2018-11-16 DIAGNOSIS — Z34.83 ENCOUNTER FOR SUPERVISION OF OTHER NORMAL PREGNANCY IN THIRD TRIMESTER: Primary | ICD-10-CM

## 2018-11-16 DIAGNOSIS — O34.219 PREGNANCY WITH HISTORY OF CESAREAN SECTION, ANTEPARTUM: ICD-10-CM

## 2018-11-16 PROBLEM — Z34.90 SUPERVISION OF NORMAL PREGNANCY: Status: ACTIVE | Noted: 2018-11-16

## 2018-11-16 PROCEDURE — PNV: Performed by: NURSE PRACTITIONER

## 2018-11-16 NOTE — PROGRESS NOTES
Problem List Items Addressed This Visit     Pregnancy with history of  section, antepartum    34 weeks gestation of pregnancy    Supervision of normal pregnancy - Primary      Feels well  Denies LOF/CTX/VB  No concerns  Discussed fetal kick counting  Flu shot received at work  FG appt on   Desires TOLAC

## 2018-11-16 NOTE — PATIENT INSTRUCTIONS
Pregnancy at 28 to [address]:   What changes are happening in my body? You are considered full term at the beginning of 37 weeks  Your breathing may be easier if your baby has moved down into a head-down position  You may need to urinate more often because the baby may be pressing on your bladder  You may also feel more discomfort and get tired easily  How do I care for myself at this stage of my pregnancy? · Eat a variety of healthy foods  Healthy foods include fruits, vegetables, whole-grain breads, low-fat dairy foods, beans, lean meats, and fish  Drink liquids as directed  Ask how much liquid to drink each day and which liquids are best for you  Limit caffeine to less than 200 milligrams each day  Limit your intake of fish to 2 servings each week  Choose fish low in mercury such as canned light tuna, shrimp, salmon, cod, or tilapia  Do not  eat fish high in mercury such as swordfish, tilefish, chante mackerel, and shark  · Take prenatal vitamins as directed  Your need for certain vitamins and minerals, such as folic acid, increases during pregnancy  Prenatal vitamins provide some of the extra vitamins and minerals you need  Prenatal vitamins may also help to decrease the risk of certain birth defects  · Rest as needed  Put your feet up if you have swelling in your ankles and feet  · Do not smoke  If you smoke, it is never too late to quit  Smoking increases your risk of a miscarriage and other health problems during your pregnancy  Smoking can cause your baby to be born too early or weigh less at birth  Ask your healthcare provider for information if you need help quitting  · Do not drink alcohol  Alcohol passes from your body to your baby through the placenta  It can affect your baby's brain development and cause fetal alcohol syndrome (FAS)  FAS is a group of conditions that causes mental, behavior, and growth problems       · Talk to your healthcare provider before you take any medicines  Many medicines may harm your baby if you take them when you are pregnant  Do not take any medicines, vitamins, herbs, or supplements without first talking to your healthcare provider  Never use illegal or street drugs (such as marijuana or cocaine) while you are pregnant  · Talk to your healthcare provider before you travel  You may not be able to travel in an airplane after 36 weeks  He may also recommend that you avoid long road trips  What are some safety tips during pregnancy? · Avoid hot tubs and saunas  Do not use a hot tub or sauna while you are pregnant, especially during your first trimester  Hot tubs and saunas may raise your baby's temperature and increase the risk of birth defects  · Avoid toxoplasmosis  This is an infection caused by eating raw meat or being around infected cat feces  It can cause birth defects, miscarriages, and other problems  Wash your hands after you touch raw meat  Make sure any meat is well-cooked before you eat it  Avoid raw eggs and unpasteurized milk  Use gloves or ask someone else to clean your cat's litter box while you are pregnant  · Ask your healthcare provider about travel  The most comfortable time to travel is during the second trimester  Ask your healthcare provider if you can travel after 36 weeks  You may not be able to travel in an airplane after 36 weeks  He may also recommend that you avoid long road trips  What changes are happening with my baby? By 38 weeks, your baby may weigh between 6 and 9 pounds  Your baby may be about 14 inches long from the top of the head to the rump (baby's bottom)  Your baby hears well enough to know your voice  As your baby gets larger, you may feel fewer kicks and more stretching and rolling  Your baby may move into a head-down position  Your baby will also rest lower in your abdomen  What do I need to know about prenatal care?   Your healthcare provider will check your blood pressure and weight  You may also need the following:  · A urine test  may also be done to check for sugar and protein  These can be signs of gestational diabetes or infection  Protein in your urine may also be a sign of preeclampsia  Preeclampsia is a condition that can develop during week 20 or later of your pregnancy  It causes high blood pressure, and it can cause problems with your kidneys and other organs  · A blood test  may be done to check for anemia (low iron level)  · A Tdap vaccine  may be recommended by your healthcare provider  · A group B strep test  is a test that is done to check for group B strep infection  Group B strep is a type of bacteria that may be found in the vagina or rectum  It can be passed to your baby during delivery if you have it  Your healthcare provider will take swab your vagina or rectum and send the sample to the lab for tests  · Fundal height  is a measurement of your uterus to check your baby's growth  This number is usually the same as the number of weeks that you have been pregnant  Your healthcare provider may also check your baby's position  · Your baby's heart rate  will be checked  When should I seek immediate care? · You develop a severe headache that does not go away  · You have new or increased vision changes, such as blurred or spotted vision  · You have new or increased swelling in your face or hands  · You have vaginal spotting or bleeding  · Your water broke or you feel warm water gushing or trickling from your vagina  When should I contact my healthcare provider? · You have more than 5 contractions in 1 hour  · You notice any changes in your baby's movements  · You have abdominal cramps, pressure, or tightening  · You have a change in vaginal discharge  · You have chills or a fever  · You have vaginal itching, burning, or pain  · You have yellow, green, white, or foul-smelling vaginal discharge      · You have pain or burning when you urinate, less urine than usual, or pink or bloody urine  · You have questions or concerns about your condition or care  CARE AGREEMENT:   You have the right to help plan your care  Learn about your health condition and how it may be treated  Discuss treatment options with your caregivers to decide what care you want to receive  You always have the right to refuse treatment  The above information is an  only  It is not intended as medical advice for individual conditions or treatments  Talk to your doctor, nurse or pharmacist before following any medical regimen to see if it is safe and effective for you  © 2017 [address] Information is for End User's use only and may not be sold, redistributed or otherwise used for commercial purposes  All illustrations and images included in CareNotes® are the copyrighted property of A D A M , Inc  or Dimitris Reyes

## 2018-11-21 PROBLEM — Z3A.35 35 WEEKS GESTATION OF PREGNANCY: Status: ACTIVE | Noted: 2018-11-16

## 2018-11-21 NOTE — PROGRESS NOTES
[address] [facility] [address]: Ms Jenni Singletary was seen today at 35w5d for NST (found under the pregnancy episode) which I reviewed the RN assessment and agree, and fetal growth ultrasound (see ultrasound report under OB procedures tab)  Please don't hesitate to contact our office with any concerns or questions    John Ragland MD

## 2018-11-23 ENCOUNTER — ULTRASOUND (OUTPATIENT)
Dept: PERINATAL CARE | Facility: MEDICAL CENTER | Age: 33
End: 2018-11-23
Payer: COMMERCIAL

## 2018-11-23 VITALS
BODY MASS INDEX: 29.77 KG/M2 | WEIGHT: 168 LBS | HEART RATE: 94 BPM | SYSTOLIC BLOOD PRESSURE: 107 MMHG | HEIGHT: 63 IN | DIASTOLIC BLOOD PRESSURE: 70 MMHG

## 2018-11-23 DIAGNOSIS — O34.219 PREGNANCY WITH HISTORY OF CESAREAN SECTION, ANTEPARTUM: ICD-10-CM

## 2018-11-23 DIAGNOSIS — O40.3XX0 POLYHYDRAMNIOS IN THIRD TRIMESTER COMPLICATION, SINGLE OR UNSPECIFIED FETUS: Primary | ICD-10-CM

## 2018-11-23 DIAGNOSIS — Z3A.35 35 WEEKS GESTATION OF PREGNANCY: ICD-10-CM

## 2018-11-23 DIAGNOSIS — Z36.89 ENCOUNTER FOR ULTRASOUND TO CHECK FETAL GROWTH: ICD-10-CM

## 2018-11-23 PROCEDURE — 59025 FETAL NON-STRESS TEST: CPT | Performed by: OBSTETRICS & GYNECOLOGY

## 2018-11-23 PROCEDURE — 76816 OB US FOLLOW-UP PER FETUS: CPT | Performed by: OBSTETRICS & GYNECOLOGY

## 2018-11-23 RX ORDER — RANITIDINE 150 MG/1
75 CAPSULE ORAL
COMMUNITY
End: 2019-01-08 | Stop reason: ALTCHOICE

## 2018-11-23 NOTE — PROGRESS NOTES
nursing education was provided by Sayra Johnston RN today on kick counting, labor precautions, and on warning signs that should prompt her to call her physician  The NST procedure and expected outcome were explained to patient  Preeclampsia precautions were reviewed where applicable  She was instructed to call her obstetrician with any questions or concerns, to monitor fetal movement, and to notify her obstetrician if she notices decreased fetal movement  She verbalized understanding and all questions were answered   Sayra Johnston RN

## 2018-11-26 ENCOUNTER — ROUTINE PRENATAL (OUTPATIENT)
Dept: OBGYN CLINIC | Facility: MEDICAL CENTER | Age: 33
End: 2018-11-26

## 2018-11-26 VITALS — SYSTOLIC BLOOD PRESSURE: 112 MMHG | BODY MASS INDEX: 29.65 KG/M2 | WEIGHT: 167.4 LBS | DIASTOLIC BLOOD PRESSURE: 58 MMHG

## 2018-11-26 DIAGNOSIS — O34.219 PREGNANCY WITH HISTORY OF CESAREAN SECTION, ANTEPARTUM: ICD-10-CM

## 2018-11-26 DIAGNOSIS — Z34.93 THIRD TRIMESTER PREGNANCY: Primary | ICD-10-CM

## 2018-11-26 DIAGNOSIS — O40.3XX0 POLYHYDRAMNIOS IN THIRD TRIMESTER COMPLICATION, SINGLE OR UNSPECIFIED FETUS: ICD-10-CM

## 2018-11-26 PROBLEM — Z3A.35 35 WEEKS GESTATION OF PREGNANCY: Status: RESOLVED | Noted: 2018-11-16 | Resolved: 2018-11-26

## 2018-11-26 PROCEDURE — PNV: Performed by: OBSTETRICS & GYNECOLOGY

## 2018-11-26 PROCEDURE — 87653 STREP B DNA AMP PROBE: CPT | Performed by: OBSTETRICS & GYNECOLOGY

## 2018-11-28 LAB — GP B STREP DNA SPEC QL NAA+PROBE: NORMAL

## 2018-11-30 ENCOUNTER — ROUTINE PRENATAL (OUTPATIENT)
Dept: PERINATAL CARE | Facility: MEDICAL CENTER | Age: 33
End: 2018-11-30
Payer: COMMERCIAL

## 2018-11-30 VITALS
SYSTOLIC BLOOD PRESSURE: 104 MMHG | HEART RATE: 80 BPM | HEIGHT: 63 IN | DIASTOLIC BLOOD PRESSURE: 59 MMHG | WEIGHT: 168.2 LBS | BODY MASS INDEX: 29.8 KG/M2

## 2018-11-30 DIAGNOSIS — O40.3XX0 POLYHYDRAMNIOS IN THIRD TRIMESTER COMPLICATION, SINGLE OR UNSPECIFIED FETUS: Primary | ICD-10-CM

## 2018-11-30 DIAGNOSIS — Z3A.36 36 WEEKS GESTATION OF PREGNANCY: ICD-10-CM

## 2018-11-30 PROCEDURE — 76818 FETAL BIOPHYS PROFILE W/NST: CPT | Performed by: OBSTETRICS & GYNECOLOGY

## 2018-11-30 NOTE — PROGRESS NOTES
Please refer to the [facility] ultrasound report in Ob Procedures for additional information regarding the visit to the [facility]  today

## 2018-12-06 ENCOUNTER — TELEPHONE (OUTPATIENT)
Dept: OBGYN CLINIC | Facility: MEDICAL CENTER | Age: 33
End: 2018-12-06

## 2018-12-06 NOTE — TELEPHONE ENCOUNTER
Patient has been waking up in the middle of the night with vomiting and diarrhea  She has been diagnosed with polyhydramnios and had concerns

## 2018-12-06 NOTE — TELEPHONE ENCOUNTER
Pt 37 weeks    For at least 1 week - ff heavy meals - she will vomit  She also has had loose stools for over 1 mo  Never mentioned it cause it was not severe  But last night, did not eat heavy meal for dinner - yet woke up and vomited  She is able to keep fluids down presently  She feels weak  Pt has Dr hendrickson tomorrow   She said baby moving well  No bleeding   Will eat many small amts food - keep hydrating, no juices, etc

## 2018-12-07 ENCOUNTER — ROUTINE PRENATAL (OUTPATIENT)
Dept: PERINATAL CARE | Facility: MEDICAL CENTER | Age: 33
End: 2018-12-07
Payer: COMMERCIAL

## 2018-12-07 ENCOUNTER — ROUTINE PRENATAL (OUTPATIENT)
Dept: OBGYN CLINIC | Facility: MEDICAL CENTER | Age: 33
End: 2018-12-07

## 2018-12-07 VITALS
DIASTOLIC BLOOD PRESSURE: 73 MMHG | SYSTOLIC BLOOD PRESSURE: 106 MMHG | WEIGHT: 168 LBS | HEIGHT: 63 IN | HEART RATE: 82 BPM | BODY MASS INDEX: 29.77 KG/M2

## 2018-12-07 VITALS — BODY MASS INDEX: 29.94 KG/M2 | DIASTOLIC BLOOD PRESSURE: 72 MMHG | SYSTOLIC BLOOD PRESSURE: 130 MMHG | WEIGHT: 169 LBS

## 2018-12-07 DIAGNOSIS — O40.3XX1 POLYHYDRAMNIOS IN THIRD TRIMESTER COMPLICATION, FETUS 1 OF MULTIPLE GESTATION: ICD-10-CM

## 2018-12-07 DIAGNOSIS — Z3A.37 37 WEEKS GESTATION OF PREGNANCY: Primary | ICD-10-CM

## 2018-12-07 DIAGNOSIS — O34.219 PREGNANCY WITH HISTORY OF CESAREAN SECTION, ANTEPARTUM: ICD-10-CM

## 2018-12-07 DIAGNOSIS — O40.3XX0 POLYHYDRAMNIOS IN THIRD TRIMESTER COMPLICATION, SINGLE OR UNSPECIFIED FETUS: ICD-10-CM

## 2018-12-07 PROCEDURE — 76818 FETAL BIOPHYS PROFILE W/NST: CPT | Performed by: OBSTETRICS & GYNECOLOGY

## 2018-12-07 PROCEDURE — PNV: Performed by: OBSTETRICS & GYNECOLOGY

## 2018-12-07 NOTE — PROGRESS NOTES
Received flu and Tdap already, GBS neg, tired, achy, trouble sleeping  She is vomiting if she is full, was told to eat smaller meals, diarrhea

## 2018-12-07 NOTE — PROGRESS NOTES
Problem List Items Addressed This Visit        Other    Pregnancy with history of  section, antepartum     We discussed patient's high rate of success with trial of labor given that she reached complete dilation and pushed for 2 hr previously  Patient is highly motivated for vaginal birth  We discussed the 0 5% or less risk of uterine rupture  We also discussed best outcomes occur with patient arriving in spontaneous labor  I have also suggested epidural for labor management  Polyhydramnios in third trimester     Patient continuing with antepartum fetal surveillance  37 weeks gestation of pregnancy - Primary     Patient feels well  She has some mild fatigue  She has occasional contractions

## 2018-12-07 NOTE — ASSESSMENT & PLAN NOTE
We discussed patient's high rate of success with trial of labor given that she reached complete dilation and pushed for 2 hr previously  Patient is highly motivated for vaginal birth  We discussed the 0 5% or less risk of uterine rupture  We also discussed best outcomes occur with patient arriving in spontaneous labor  I have also suggested epidural for labor management

## 2018-12-11 ENCOUNTER — ROUTINE PRENATAL (OUTPATIENT)
Dept: OBGYN CLINIC | Age: 33
End: 2018-12-11

## 2018-12-11 VITALS — BODY MASS INDEX: 30.29 KG/M2 | SYSTOLIC BLOOD PRESSURE: 108 MMHG | WEIGHT: 171 LBS | DIASTOLIC BLOOD PRESSURE: 82 MMHG

## 2018-12-11 DIAGNOSIS — Z3A.37 37 WEEKS GESTATION OF PREGNANCY: ICD-10-CM

## 2018-12-11 DIAGNOSIS — Z34.83 ENCOUNTER FOR SUPERVISION OF OTHER NORMAL PREGNANCY IN THIRD TRIMESTER: Primary | ICD-10-CM

## 2018-12-11 DIAGNOSIS — O34.219 PREGNANCY WITH HISTORY OF CESAREAN SECTION, ANTEPARTUM: ICD-10-CM

## 2018-12-11 DIAGNOSIS — Z34.83 MULTIGRAVIDA IN THIRD TRIMESTER: ICD-10-CM

## 2018-12-11 PROCEDURE — PNV: Performed by: OBSTETRICS & GYNECOLOGY

## 2018-12-11 NOTE — PROGRESS NOTES
Problem List Items Addressed This Visit        Other    Multigravida in third trimester    Pregnancy with history of  section, antepartum    Supervision of normal pregnancy - Primary     38w  Plan for TOLAC  Polyhydramnios- APFS    Signs of labor and fetal kick counts discussed           RESOLVED: 37 weeks gestation of pregnancy

## 2018-12-13 ENCOUNTER — ROUTINE PRENATAL (OUTPATIENT)
Dept: PERINATAL CARE | Facility: MEDICAL CENTER | Age: 33
End: 2018-12-13
Payer: COMMERCIAL

## 2018-12-13 VITALS
BODY MASS INDEX: 29.71 KG/M2 | SYSTOLIC BLOOD PRESSURE: 99 MMHG | HEART RATE: 86 BPM | HEIGHT: 63 IN | WEIGHT: 167.7 LBS | DIASTOLIC BLOOD PRESSURE: 64 MMHG

## 2018-12-13 DIAGNOSIS — Z34.83 MULTIGRAVIDA IN THIRD TRIMESTER: ICD-10-CM

## 2018-12-13 DIAGNOSIS — O40.3XX0 POLYHYDRAMNIOS IN THIRD TRIMESTER COMPLICATION, SINGLE OR UNSPECIFIED FETUS: Primary | ICD-10-CM

## 2018-12-13 DIAGNOSIS — O34.219 PREGNANCY WITH HISTORY OF CESAREAN SECTION, ANTEPARTUM: ICD-10-CM

## 2018-12-13 DIAGNOSIS — Z3A.38 38 WEEKS GESTATION OF PREGNANCY: ICD-10-CM

## 2018-12-13 PROCEDURE — 76818 FETAL BIOPHYS PROFILE W/NST: CPT | Performed by: OBSTETRICS & GYNECOLOGY

## 2018-12-13 NOTE — PATIENT INSTRUCTIONS
Kick Counts in Pregnancy   AMBULATORY CARE:   Kick counts  measure how much your baby is moving in your womb  A kick from your baby can be felt as a twist, turn, swish, roll, or jab  It is common to feel your baby kicking at 26 to 28 weeks of pregnancy  You may feel your baby kick as early as 20 weeks of pregnancy  Seek care immediately if:   · You feel your baby kick less as the day goes on      · You do not feel any kicks in a day  Contact your healthcare provider if:   · You feel a change in the number of kicks or movements of your baby  · You feel fewer than 10 kicks within 2 hours after counting  · You have questions or concerns about your baby's movements  Why measure kick counts:  Your baby's movement may provide information about your baby's health  He may move less, or not at all, if there are problems  He may move less if he does not have enough room to grow in your uterus (womb)  He may also move less if he is not getting enough oxygen or nutrition from the placenta  Tell your healthcare provider as soon as you feel a change in your baby's movements  Problems that are found earlier are easier to treat  When to measure kick counts:   · Measure kick counts at the same time every day  · Measure kick counts when your baby is awake and most active  Your baby may be most active in the evening  · Measure kick counts after a meal or snack or when your baby is usually most active  Your baby may be more active after you eat or in the evening  · You should not smoke while you are pregnant  Smoking increases the risk of health problems for you and for your baby during your pregnancy  If you do smoke, wait 2 hours to measure kick counts  Nicotine can make your baby more active than usual   How to measure kick counts:  Check that your baby is awake before you measure kick counts  You can wake up your baby by lightly pushing on your belly, walking, or drinking something cold   Your healthcare provider may tell you different ways to measure kick counts  He/She may tell you to do the following:  · Use a chart or clock to keep track of the time you start and finish counting  · Sit in a chair or lie on your left side  · Place your hands on the largest part of your belly  · Count until you reach 10 kicks  Write down how much time it takes to count 10 kicks  · It may take 30 minutes to 2 hours to count 10 kicks  It should not take more than 2 hours to count 10 kicks  If you do not feel 10 kicks within 2 hours, Call your Obstetrician    Follow up with your healthcare provider as directed:  Write down your questions so you remember to ask them during your visits  © 2017 [address] Information is for End User's use only and may not be sold, redistributed or otherwise used for commercial purposes  All illustrations and images included in CareNotes® are the copyrighted property of A D A M , Inc  or Dimitris Reyes  The above information is an  only  It is not intended as medical advice for individual conditions or treatments  Talk to your doctor, nurse or pharmacist before following any medical regimen to see if it is safe and effective for you

## 2018-12-20 ENCOUNTER — ROUTINE PRENATAL (OUTPATIENT)
Dept: PERINATAL CARE | Facility: MEDICAL CENTER | Age: 33
End: 2018-12-20
Payer: COMMERCIAL

## 2018-12-20 ENCOUNTER — ROUTINE PRENATAL (OUTPATIENT)
Dept: OBGYN CLINIC | Facility: MEDICAL CENTER | Age: 33
End: 2018-12-20

## 2018-12-20 VITALS
HEIGHT: 63 IN | DIASTOLIC BLOOD PRESSURE: 61 MMHG | BODY MASS INDEX: 30.32 KG/M2 | HEART RATE: 74 BPM | SYSTOLIC BLOOD PRESSURE: 106 MMHG | WEIGHT: 171.1 LBS

## 2018-12-20 VITALS
WEIGHT: 172 LBS | RESPIRATION RATE: 14 BRPM | DIASTOLIC BLOOD PRESSURE: 70 MMHG | HEIGHT: 63 IN | BODY MASS INDEX: 30.48 KG/M2 | SYSTOLIC BLOOD PRESSURE: 116 MMHG

## 2018-12-20 DIAGNOSIS — Z34.83 MULTIGRAVIDA IN THIRD TRIMESTER: ICD-10-CM

## 2018-12-20 DIAGNOSIS — O34.219 PREGNANCY WITH HISTORY OF CESAREAN SECTION, ANTEPARTUM: ICD-10-CM

## 2018-12-20 DIAGNOSIS — Z3A.39 39 WEEKS GESTATION OF PREGNANCY: Primary | ICD-10-CM

## 2018-12-20 DIAGNOSIS — O40.3XX0 POLYHYDRAMNIOS IN THIRD TRIMESTER COMPLICATION, SINGLE OR UNSPECIFIED FETUS: ICD-10-CM

## 2018-12-20 PROCEDURE — 76815 OB US LIMITED FETUS(S): CPT | Performed by: OBSTETRICS & GYNECOLOGY

## 2018-12-20 PROCEDURE — 59025 FETAL NON-STRESS TEST: CPT | Performed by: OBSTETRICS & GYNECOLOGY

## 2018-12-20 PROCEDURE — 76818 FETAL BIOPHYS PROFILE W/NST: CPT | Performed by: OBSTETRICS & GYNECOLOGY

## 2018-12-20 PROCEDURE — PNV: Performed by: OBSTETRICS & GYNECOLOGY

## 2018-12-20 NOTE — PROGRESS NOTES
Patient is a 58-year-old female, para 1, at 39 weeks and 4 days here for routine prenatal visit without complaint  Pregnancy is complicated by prior  delivery and polyhydramnios  Review of systems is positive for fetal movement negative for loss of fluid vaginal bleeding or regular contractions  [facility] by limited office ultrasound  Patient requests cervical exam today

## 2018-12-21 ENCOUNTER — TELEPHONE (OUTPATIENT)
Dept: OBGYN CLINIC | Facility: CLINIC | Age: 33
End: 2018-12-21

## 2018-12-21 PROBLEM — Z3A.39 39 WEEKS GESTATION OF PREGNANCY: Status: ACTIVE | Noted: 2018-12-13

## 2018-12-21 NOTE — TELEPHONE ENCOUNTER
FLAVIO recommending IOL for polyhydramnios  Pt called to inform her of the recommendation  She was hoping to go into labor on her own d/t the fact that she would like to try a TOLAC  She will think about her options and call back

## 2018-12-21 NOTE — PROGRESS NOTES
Ms Mike Olivera  is a 29 yo G 3 P1 at 44 4/7  weeks gestation who presents for a fetal ultrasound examination for evaluation of LISSETTE and NST  Hx of  polyhydramnios and previous C section    She is asymptomatic  See Ob procedures  1  LISSETTE  polyhydramnios = 33 6  2  NST reactive      BPP 8/8      Recommendations; 1  Twice a week NSTs, once a week LISSETTE  2  Fetal movement counts  3  Consider plan for delivery due to the combination of polyhydramnios and term pregnancy      Nereida Teran MD

## 2018-12-21 NOTE — TELEPHONE ENCOUNTER
Pt called back- she is not sure that she wants to be induced  She would like a doctor to call her back  TT Dr Yara Hoover with this info  She will call the pt back

## 2018-12-24 ENCOUNTER — ROUTINE PRENATAL (OUTPATIENT)
Dept: PERINATAL CARE | Facility: CLINIC | Age: 33
End: 2018-12-24
Payer: COMMERCIAL

## 2018-12-24 ENCOUNTER — ROUTINE PRENATAL (OUTPATIENT)
Dept: OBGYN CLINIC | Facility: MEDICAL CENTER | Age: 33
End: 2018-12-24

## 2018-12-24 VITALS — WEIGHT: 173.4 LBS | SYSTOLIC BLOOD PRESSURE: 120 MMHG | DIASTOLIC BLOOD PRESSURE: 62 MMHG | BODY MASS INDEX: 30.72 KG/M2

## 2018-12-24 VITALS
WEIGHT: 173.9 LBS | SYSTOLIC BLOOD PRESSURE: 112 MMHG | HEIGHT: 63 IN | HEART RATE: 85 BPM | BODY MASS INDEX: 30.81 KG/M2 | DIASTOLIC BLOOD PRESSURE: 69 MMHG

## 2018-12-24 DIAGNOSIS — O34.219 PREGNANCY WITH HISTORY OF CESAREAN SECTION, ANTEPARTUM: ICD-10-CM

## 2018-12-24 DIAGNOSIS — Z34.83 MULTIGRAVIDA IN THIRD TRIMESTER: ICD-10-CM

## 2018-12-24 DIAGNOSIS — Z34.83 ENCOUNTER FOR SUPERVISION OF OTHER NORMAL PREGNANCY IN THIRD TRIMESTER: Primary | ICD-10-CM

## 2018-12-24 DIAGNOSIS — Z3A.39 39 WEEKS GESTATION OF PREGNANCY: ICD-10-CM

## 2018-12-24 DIAGNOSIS — O48.0 POST-TERM PREGNANCY, 40-42 WEEKS OF GESTATION: Primary | ICD-10-CM

## 2018-12-24 PROCEDURE — PNV: Performed by: PHYSICIAN ASSISTANT

## 2018-12-24 PROCEDURE — 76815 OB US LIMITED FETUS(S): CPT | Performed by: OBSTETRICS & GYNECOLOGY

## 2018-12-24 PROCEDURE — 59025 FETAL NON-STRESS TEST: CPT | Performed by: OBSTETRICS & GYNECOLOGY

## 2018-12-24 NOTE — PROGRESS NOTES
Please refer to the [facility] ultrasound report in Ob Procedures for additional information regarding the visit to the [facility]  today

## 2018-12-24 NOTE — PROGRESS NOTES
Patient w/o complaints  (+) good fetal movement, denies any bleeding, fluid leakage and only occasional ctx  LISSETTE- polyhydramnios but improved  She has induction scheduled for 12/26      Problem List Items Addressed This Visit     Supervision of normal pregnancy - Primary     For IOL 12/26  Discussed labor precautions, fetal kick counts and reasons to call

## 2018-12-26 ENCOUNTER — ANESTHESIA (INPATIENT)
Dept: LABOR AND DELIVERY | Facility: HOSPITAL | Age: 33
End: 2018-12-26
Payer: COMMERCIAL

## 2018-12-26 ENCOUNTER — HOSPITAL ENCOUNTER (INPATIENT)
Facility: HOSPITAL | Age: 33
LOS: 3 days | Discharge: HOME | End: 2018-12-29
Attending: OBSTETRICS & GYNECOLOGY | Admitting: OBSTETRICS & GYNECOLOGY
Payer: COMMERCIAL

## 2018-12-26 ENCOUNTER — ANESTHESIA EVENT (INPATIENT)
Dept: LABOR AND DELIVERY | Facility: HOSPITAL | Age: 33
End: 2018-12-26
Payer: COMMERCIAL

## 2018-12-26 ENCOUNTER — HOSPITAL ENCOUNTER (OUTPATIENT)
Dept: LABOR AND DELIVERY | Facility: HOSPITAL | Age: 33
Discharge: HOME | End: 2018-12-26
Payer: COMMERCIAL

## 2018-12-26 DIAGNOSIS — Z98.891 STATUS POST REPEAT LOW TRANSVERSE CESAREAN SECTION: Primary | ICD-10-CM

## 2018-12-26 PROBLEM — Z3A.40 40 WEEKS GESTATION OF PREGNANCY: Status: ACTIVE | Noted: 2018-12-26

## 2018-12-26 PROBLEM — Z3A.39 39 WEEKS GESTATION OF PREGNANCY: Status: RESOLVED | Noted: 2018-12-13 | Resolved: 2018-12-26

## 2018-12-26 LAB
ABO GROUP BLD: NORMAL
BASE EXCESS BLDCOA CALC-SCNC: -3.5 MMOL/L (ref 3–11)
BASE EXCESS BLDCOV CALC-SCNC: -3 MMOL/L (ref 1–9)
BASOPHILS # BLD AUTO: 0.09 THOUSANDS/ΜL (ref 0–0.1)
BASOPHILS NFR BLD AUTO: 1 % (ref 0–1)
BLD GP AB SCN SERPL QL: NEGATIVE
EOSINOPHIL # BLD AUTO: 0.24 THOUSAND/ΜL (ref 0–0.61)
EOSINOPHIL NFR BLD AUTO: 2 % (ref 0–6)
ERYTHROCYTE [DISTWIDTH] IN BLOOD BY AUTOMATED COUNT: 13.2 % (ref 11.6–15.1)
HCO3 BLDCOA-SCNC: 23.4 MMOL/L (ref 17.3–27.3)
HCO3 BLDCOV-SCNC: 22.7 MMOL/L (ref 12.2–28.6)
HCT VFR BLD AUTO: 33.4 % (ref 34.8–46.1)
HGB BLD-MCNC: 11.1 G/DL (ref 11.5–15.4)
IMM GRANULOCYTES # BLD AUTO: 0.16 THOUSAND/UL (ref 0–0.2)
IMM GRANULOCYTES NFR BLD AUTO: 1 % (ref 0–2)
LYMPHOCYTES # BLD AUTO: 2.18 THOUSANDS/ΜL (ref 0.6–4.47)
LYMPHOCYTES NFR BLD AUTO: 14 % (ref 14–44)
MCH RBC QN AUTO: 29.4 PG (ref 26.8–34.3)
MCHC RBC AUTO-ENTMCNC: 33.2 G/DL (ref 31.4–37.4)
MCV RBC AUTO: 89 FL (ref 82–98)
MONOCYTES # BLD AUTO: 1.25 THOUSAND/ΜL (ref 0.17–1.22)
MONOCYTES NFR BLD AUTO: 8 % (ref 4–12)
NEUTROPHILS # BLD AUTO: 12.01 THOUSANDS/ΜL (ref 1.85–7.62)
NEUTS SEG NFR BLD AUTO: 74 % (ref 43–75)
NRBC BLD AUTO-RTO: 0 /100 WBCS
O2 CT VFR BLDCOA CALC: 6.1 ML/DL
OXYHGB MFR BLDCOA: 28.7 %
OXYHGB MFR BLDCOV: 29.2 %
PCO2 BLDCOA: 48.7 MM[HG] (ref 30–60)
PCO2 BLDCOV: 42.9 MM HG (ref 27–43)
PH BLDCOA: 7.3 [PH] (ref 7.23–7.43)
PH BLDCOV: 7.34 [PH] (ref 7.19–7.49)
PLATELET # BLD AUTO: 182 THOUSANDS/UL (ref 149–390)
PMV BLD AUTO: 12.3 FL (ref 8.9–12.7)
PO2 BLDCOA: 14.7 MM HG (ref 5–25)
PO2 BLDCOV: 14.5 MM HG (ref 15–45)
RBC # BLD AUTO: 3.77 MILLION/UL (ref 3.81–5.12)
RH BLD: POSITIVE
SAO2 % BLDCOV: 6.1 ML/DL
SPECIMEN EXPIRATION DATE: NORMAL
WBC # BLD AUTO: 15.93 THOUSAND/UL (ref 4.31–10.16)

## 2018-12-26 PROCEDURE — 4A1HXCZ MONITORING OF PRODUCTS OF CONCEPTION, CARDIAC RATE, EXTERNAL APPROACH: ICD-10-PCS | Performed by: OBSTETRICS & GYNECOLOGY

## 2018-12-26 PROCEDURE — 85025 COMPLETE CBC W/AUTO DIFF WBC: CPT | Performed by: OBSTETRICS & GYNECOLOGY

## 2018-12-26 PROCEDURE — 86592 SYPHILIS TEST NON-TREP QUAL: CPT | Performed by: OBSTETRICS & GYNECOLOGY

## 2018-12-26 PROCEDURE — 86850 RBC ANTIBODY SCREEN: CPT | Performed by: OBSTETRICS & GYNECOLOGY

## 2018-12-26 PROCEDURE — 86900 BLOOD TYPING SEROLOGIC ABO: CPT | Performed by: OBSTETRICS & GYNECOLOGY

## 2018-12-26 PROCEDURE — 99214 OFFICE O/P EST MOD 30 MIN: CPT

## 2018-12-26 PROCEDURE — 82805 BLOOD GASES W/O2 SATURATION: CPT | Performed by: OBSTETRICS & GYNECOLOGY

## 2018-12-26 PROCEDURE — 86901 BLOOD TYPING SEROLOGIC RH(D): CPT | Performed by: OBSTETRICS & GYNECOLOGY

## 2018-12-26 RX ORDER — NALBUPHINE HCL 10 MG/ML
10 AMPUL (ML) INJECTION
Status: DISCONTINUED | OUTPATIENT
Start: 2018-12-26 | End: 2018-12-29 | Stop reason: HOSPADM

## 2018-12-26 RX ORDER — GLYCOPYRROLATE 0.2 MG/ML
INJECTION INTRAMUSCULAR; INTRAVENOUS AS NEEDED
Status: DISCONTINUED | OUTPATIENT
Start: 2018-12-26 | End: 2018-12-26 | Stop reason: SURG

## 2018-12-26 RX ORDER — HYDROMORPHONE HCL/PF 1 MG/ML
0.5 SYRINGE (ML) INJECTION EVERY 2 HOUR PRN
Status: DISCONTINUED | OUTPATIENT
Start: 2018-12-26 | End: 2018-12-27 | Stop reason: SDUPTHER

## 2018-12-26 RX ORDER — ACETAMINOPHEN 325 MG/1
650 TABLET ORAL EVERY 6 HOURS
Status: DISCONTINUED | OUTPATIENT
Start: 2018-12-26 | End: 2018-12-29 | Stop reason: HOSPADM

## 2018-12-26 RX ORDER — DIPHENHYDRAMINE HYDROCHLORIDE 50 MG/ML
25 INJECTION INTRAMUSCULAR; INTRAVENOUS EVERY 6 HOURS PRN
Status: DISCONTINUED | OUTPATIENT
Start: 2018-12-26 | End: 2018-12-27 | Stop reason: SDUPTHER

## 2018-12-26 RX ORDER — METOCLOPRAMIDE HYDROCHLORIDE 5 MG/ML
5 INJECTION INTRAMUSCULAR; INTRAVENOUS EVERY 6 HOURS PRN
Status: ACTIVE | OUTPATIENT
Start: 2018-12-26 | End: 2018-12-27

## 2018-12-26 RX ORDER — OXYTOCIN/RINGER'S LACTATE 30/500 ML
PLASTIC BAG, INJECTION (ML) INTRAVENOUS
Status: COMPLETED
Start: 2018-12-26 | End: 2018-12-26

## 2018-12-26 RX ORDER — TRISODIUM CITRATE DIHYDRATE AND CITRIC ACID MONOHYDRATE 500; 334 MG/5ML; MG/5ML
30 SOLUTION ORAL ONCE
Status: COMPLETED | OUTPATIENT
Start: 2018-12-26 | End: 2018-12-26

## 2018-12-26 RX ORDER — KETOROLAC TROMETHAMINE 30 MG/ML
30 INJECTION, SOLUTION INTRAMUSCULAR; INTRAVENOUS EVERY 6 HOURS
Status: COMPLETED | OUTPATIENT
Start: 2018-12-27 | End: 2018-12-27

## 2018-12-26 RX ORDER — OXYTOCIN/RINGER'S LACTATE 30/500 ML
PLASTIC BAG, INJECTION (ML) INTRAVENOUS CONTINUOUS PRN
Status: DISCONTINUED | OUTPATIENT
Start: 2018-12-26 | End: 2018-12-26 | Stop reason: SURG

## 2018-12-26 RX ORDER — SODIUM CHLORIDE, SODIUM LACTATE, POTASSIUM CHLORIDE, CALCIUM CHLORIDE 600; 310; 30; 20 MG/100ML; MG/100ML; MG/100ML; MG/100ML
125 INJECTION, SOLUTION INTRAVENOUS CONTINUOUS
Status: DISCONTINUED | OUTPATIENT
Start: 2018-12-26 | End: 2018-12-29 | Stop reason: HOSPADM

## 2018-12-26 RX ORDER — DEXAMETHASONE SODIUM PHOSPHATE 4 MG/ML
8 INJECTION, SOLUTION INTRA-ARTICULAR; INTRALESIONAL; INTRAMUSCULAR; INTRAVENOUS; SOFT TISSUE ONCE AS NEEDED
Status: DISPENSED | OUTPATIENT
Start: 2018-12-26 | End: 2018-12-27

## 2018-12-26 RX ORDER — CEFAZOLIN SODIUM 1 G/50ML
1000 SOLUTION INTRAVENOUS ONCE
Status: COMPLETED | OUTPATIENT
Start: 2018-12-26 | End: 2018-12-26

## 2018-12-26 RX ORDER — ONDANSETRON 2 MG/ML
4 INJECTION INTRAMUSCULAR; INTRAVENOUS EVERY 4 HOURS PRN
Status: DISCONTINUED | OUTPATIENT
Start: 2018-12-26 | End: 2018-12-27 | Stop reason: SDUPTHER

## 2018-12-26 RX ORDER — NALOXONE HYDROCHLORIDE 0.4 MG/ML
0.1 INJECTION, SOLUTION INTRAMUSCULAR; INTRAVENOUS; SUBCUTANEOUS
Status: ACTIVE | OUTPATIENT
Start: 2018-12-26 | End: 2018-12-27

## 2018-12-26 RX ORDER — HYDROMORPHONE HCL/PF 1 MG/ML
0.2 SYRINGE (ML) INJECTION
Status: DISCONTINUED | OUTPATIENT
Start: 2018-12-26 | End: 2018-12-27 | Stop reason: SDUPTHER

## 2018-12-26 RX ORDER — ROPIVACAINE HYDROCHLORIDE 2 MG/ML
INJECTION, SOLUTION EPIDURAL; INFILTRATION; PERINEURAL AS NEEDED
Status: DISCONTINUED | OUTPATIENT
Start: 2018-12-26 | End: 2018-12-26 | Stop reason: SURG

## 2018-12-26 RX ORDER — ONDANSETRON 2 MG/ML
4 INJECTION INTRAMUSCULAR; INTRAVENOUS EVERY 6 HOURS PRN
Status: DISCONTINUED | OUTPATIENT
Start: 2018-12-26 | End: 2018-12-26

## 2018-12-26 RX ORDER — TRISODIUM CITRATE DIHYDRATE AND CITRIC ACID MONOHYDRATE 500; 334 MG/5ML; MG/5ML
SOLUTION ORAL
Status: COMPLETED
Start: 2018-12-26 | End: 2018-12-26

## 2018-12-26 RX ORDER — MORPHINE SULFATE 1 MG/ML
INJECTION, SOLUTION EPIDURAL; INTRATHECAL; INTRAVENOUS AS NEEDED
Status: DISCONTINUED | OUTPATIENT
Start: 2018-12-26 | End: 2018-12-26

## 2018-12-26 RX ORDER — LIDOCAINE HYDROCHLORIDE 20 MG/ML
INJECTION, SOLUTION EPIDURAL; INFILTRATION; INTRACAUDAL; PERINEURAL AS NEEDED
Status: DISCONTINUED | OUTPATIENT
Start: 2018-12-26 | End: 2018-12-26 | Stop reason: SURG

## 2018-12-26 RX ORDER — FENTANYL CITRATE 50 UG/ML
INJECTION, SOLUTION INTRAMUSCULAR; INTRAVENOUS AS NEEDED
Status: DISCONTINUED | OUTPATIENT
Start: 2018-12-26 | End: 2018-12-26

## 2018-12-26 RX ORDER — LIDOCAINE HYDROCHLORIDE AND EPINEPHRINE 20; 5 MG/ML; UG/ML
INJECTION, SOLUTION EPIDURAL; INFILTRATION; INTRACAUDAL; PERINEURAL AS NEEDED
Status: DISCONTINUED | OUTPATIENT
Start: 2018-12-26 | End: 2018-12-26 | Stop reason: SURG

## 2018-12-26 RX ORDER — OXYTOCIN/RINGER'S LACTATE 30/500 ML
62.5 PLASTIC BAG, INJECTION (ML) INTRAVENOUS CONTINUOUS
Status: ACTIVE | OUTPATIENT
Start: 2018-12-26 | End: 2018-12-27

## 2018-12-26 RX ORDER — KETOROLAC TROMETHAMINE 30 MG/ML
30 INJECTION, SOLUTION INTRAMUSCULAR; INTRAVENOUS EVERY 6 HOURS
Status: DISCONTINUED | OUTPATIENT
Start: 2018-12-26 | End: 2018-12-26

## 2018-12-26 RX ORDER — HYDROMORPHONE HCL/PF 1 MG/ML
0.4 SYRINGE (ML) INJECTION
Status: DISCONTINUED | OUTPATIENT
Start: 2018-12-26 | End: 2018-12-29 | Stop reason: HOSPADM

## 2018-12-26 RX ORDER — KETOROLAC TROMETHAMINE 30 MG/ML
INJECTION, SOLUTION INTRAMUSCULAR; INTRAVENOUS AS NEEDED
Status: DISCONTINUED | OUTPATIENT
Start: 2018-12-26 | End: 2018-12-26 | Stop reason: SURG

## 2018-12-26 RX ORDER — ONDANSETRON 2 MG/ML
INJECTION INTRAMUSCULAR; INTRAVENOUS AS NEEDED
Status: DISCONTINUED | OUTPATIENT
Start: 2018-12-26 | End: 2018-12-26

## 2018-12-26 RX ADMIN — Medication 62.5 MILLI-UNITS/MIN: at 22:14

## 2018-12-26 RX ADMIN — MORPHINE SULFATE 3 MG: 1 INJECTION, SOLUTION EPIDURAL; INTRATHECAL; INTRAVENOUS at 20:01

## 2018-12-26 RX ADMIN — SODIUM CITRATE AND CITRIC ACID MONOHYDRATE 30 ML: 500; 334 SOLUTION ORAL at 17:34

## 2018-12-26 RX ADMIN — LIDOCAINE HYDROCHLORIDE 5 ML: 20 INJECTION, SOLUTION EPIDURAL; INFILTRATION; INTRACAUDAL; PERINEURAL at 19:14

## 2018-12-26 RX ADMIN — ONDANSETRON 4 MG: 2 INJECTION INTRAMUSCULAR; INTRAVENOUS at 23:55

## 2018-12-26 RX ADMIN — ONDANSETRON 4 MG: 2 INJECTION INTRAMUSCULAR; INTRAVENOUS at 19:55

## 2018-12-26 RX ADMIN — ACETAMINOPHEN 650 MG: 325 TABLET, FILM COATED ORAL at 22:16

## 2018-12-26 RX ADMIN — SODIUM CHLORIDE, SODIUM LACTATE, POTASSIUM CHLORIDE, AND CALCIUM CHLORIDE 125 ML/HR: .6; .31; .03; .02 INJECTION, SOLUTION INTRAVENOUS at 10:50

## 2018-12-26 RX ADMIN — LIDOCAINE HYDROCHLORIDE 5 ML: 20 INJECTION, SOLUTION EPIDURAL; INFILTRATION; INTRACAUDAL; PERINEURAL at 19:21

## 2018-12-26 RX ADMIN — TRISODIUM CITRATE DIHYDRATE AND CITRIC ACID MONOHYDRATE 30 ML: 500; 334 SOLUTION ORAL at 12:47

## 2018-12-26 RX ADMIN — ROPIVACAINE HYDROCHLORIDE 5 ML: 2 INJECTION, SOLUTION EPIDURAL; INFILTRATION at 12:12

## 2018-12-26 RX ADMIN — ROPIVACAINE HYDROCHLORIDE: 2 INJECTION, SOLUTION EPIDURAL; INFILTRATION at 12:12

## 2018-12-26 RX ADMIN — MORPHINE SULFATE 2 MG: 1 INJECTION, SOLUTION EPIDURAL; INTRATHECAL; INTRAVENOUS at 20:03

## 2018-12-26 RX ADMIN — SODIUM CHLORIDE, SODIUM LACTATE, POTASSIUM CHLORIDE, AND CALCIUM CHLORIDE 999 ML/HR: .6; .31; .03; .02 INJECTION, SOLUTION INTRAVENOUS at 12:00

## 2018-12-26 RX ADMIN — LIDOCAINE HYDROCHLORIDE AND EPINEPHRINE 5 ML: 20; 5 INJECTION, SOLUTION EPIDURAL; INFILTRATION; INTRACAUDAL; PERINEURAL at 19:14

## 2018-12-26 RX ADMIN — PHENYLEPHRINE HYDROCHLORIDE 20 MCG/MIN: 10 INJECTION INTRAVENOUS at 19:53

## 2018-12-26 RX ADMIN — SODIUM CHLORIDE, SODIUM LACTATE, POTASSIUM CHLORIDE, AND CALCIUM CHLORIDE 125 ML/HR: .6; .31; .03; .02 INJECTION, SOLUTION INTRAVENOUS at 23:13

## 2018-12-26 RX ADMIN — GLYCOPYRROLATE 0.2 MG: 0.2 INJECTION INTRAMUSCULAR; INTRAVENOUS at 19:55

## 2018-12-26 RX ADMIN — SODIUM CITRATE AND CITRIC ACID MONOHYDRATE 30 ML: 500; 334 SOLUTION ORAL at 12:47

## 2018-12-26 RX ADMIN — KETOROLAC TROMETHAMINE 30 MG: 30 INJECTION, SOLUTION INTRAMUSCULAR at 19:55

## 2018-12-26 RX ADMIN — LIDOCAINE HYDROCHLORIDE 5 ML: 20 INJECTION, SOLUTION EPIDURAL; INFILTRATION; INTRACAUDAL; PERINEURAL at 19:18

## 2018-12-26 RX ADMIN — FENTANYL CITRATE 100 MCG: 50 INJECTION, SOLUTION INTRAMUSCULAR; INTRAVENOUS at 19:23

## 2018-12-26 RX ADMIN — TRISODIUM CITRATE DIHYDRATE AND CITRIC ACID MONOHYDRATE 30 ML: 500; 334 SOLUTION ORAL at 17:34

## 2018-12-26 RX ADMIN — LIDOCAINE HYDROCHLORIDE AND EPINEPHRINE 5 ML: 20; 5 INJECTION, SOLUTION EPIDURAL; INFILTRATION; INTRACAUDAL; PERINEURAL at 12:08

## 2018-12-26 RX ADMIN — Medication 250 MILLI-UNITS/MIN: at 19:52

## 2018-12-26 RX ADMIN — CEFAZOLIN SODIUM 1000 MG: 1 SOLUTION INTRAVENOUS at 19:26

## 2018-12-26 RX ADMIN — AZITHROMYCIN 500 MG: 500 INJECTION, POWDER, LYOPHILIZED, FOR SOLUTION INTRAVENOUS at 19:29

## 2018-12-26 RX ADMIN — LIDOCAINE HYDROCHLORIDE 5 ML: 20 INJECTION, SOLUTION EPIDURAL; INFILTRATION; INTRACAUDAL; PERINEURAL at 19:23

## 2018-12-26 RX ADMIN — ONDANSETRON 4 MG: 2 INJECTION INTRAMUSCULAR; INTRAVENOUS at 14:48

## 2018-12-26 NOTE — DISCHARGE SUMMARY
CS Discharge Summary - Herminia Fraga 28 y o  female MRN: [id number]    Unit/Bed#: -01 Encounter: [id number]    Admission Date: 2018     Discharge Date:     Admitting Diagnosis:   1  IUP at 40w3d  2  TOLAC  3  Prior  section  4  Arrest of descent    Discharge Diagnosis:   Same, delivered    Procedures:   repeat  section, low transverse incision    Admitting Attending: Dr Zully Grant  Delivery Attending: Dr Guadalupe Skiff  Discharge Attending: Micky Fernandez DO    Consult service: none  Consult attending: none    Hospital Course:     Esvin Cardoza is a 28 y o  [id number] who was admitted at 40w3d for active labor  She was found to be 6cm on exam   She SROM'd at 1230 for clear fluid  She became complete at 1550 and started pushing at 1723  After 1 hour of pushing there was arrest of descent  Fetal position was LOP and there was noted to be a prominent sacrum and narrow pubic arch  Repeat  section was recommended after discussion including the risks, benefits and alternatives  Patient gave informed consent to proceed  She then underwent an uncomplicated  section delivery and delivered a viable male  at 200  APGARS were 9, 9 at 1 and 5 minutes, respectively   weighed 8lb 3oz  Placenta was delivered at [address]   was then transferred to  nursery  Patient tolerated the procedure well and was transferred to recovery in stable condition  The patient's post partum course was unremarkable    Preoperative hemaglobin was 11 1, postoperative was 9 2  Her postoperative pain was well controlled with oral analgesics  On day of discharge, she was ambulating and able to reasonably perform all ADLs  She was voiding and had appropriate bowel function  Pain was well controlled  She was discharged home on post-operative day #3 without complications   Patient was instructed to follow up with her OB as an outpatient and was given appropriate warnings to call provider if she develops signs of infection or uncontrolled pain  Complications:   None    Condition at discharge:   good     Provisions for Follow-Up Care:  See after visit summary for information related to follow-up care and any pertinent home health orders  Disposition:   Home    Planned Readmission:   No    Discharge Medications:   Prenatal vitamin daily for 6 months or the duration of nursing whichever is longer  Motrin 600 mg orally every 6 hours as needed for pain  Tylenol (over the counter) per bottle directions as needed for pain  Hydrocortisone cream 1% (over the counter) applied 1-2x daily to hemorrhoids as needed  Witch hazel pads for hemorrhoidal discomfort as needed      Discharge instructions :   -Do not place anything (no partner, tampons or douche) in your vagina for 6 weeks  -You may walk for exercise for the first 6 weeks then gradually return to your usual activities    -Please do not drive for 1 week if you have no stitches and for 2 weeks if you have stitches or underwent a  delivery     -You may take baths or shower per your preference    -Please look at your bust (breasts) in the mirror daily and call provider for redness or tenderness or increased warmth  - If you have had a  please look at your incision daily as well and call provider for increasing redness or steady drainage from the incision    -Please call your provider if temperature > 100 4*F or 38* C, worsening pain or a foul discharge

## 2018-12-26 NOTE — H&P
H&P Exam - Obstetrics   Vergia Buncharissa Ponist 28 y o  female MRN: [id number]  Unit/Bed#: LD Triage 1- Encounter: [id number]    Assessment/Plan     Assessment:  40 weeks gestation of pregnancy  Active Labor  Prior csection  Polyhydramnios    Plan:  Admission  Expectant management      History of Present Illness   Chief Complaint: Active labor    HPI:  Artur Perez is a 28 y o   0 1 1  female with an SENA of 2018, by Last Menstrual Period at 40w3d weeks gestation who is being admitted for Active labor  Contractions: every 3-5 minutes since 530Am  Leakage of fluid: None  Bleeding: None  Fetal movement: present  Pregnancy complications: Prior csection, request for trial of labor  Polyhydramnios    Review of Systems   Constitutional: Negative for activity change, appetite change, chills, fatigue and fever  HENT: Negative for rhinorrhea, sneezing and sore throat  Eyes: Negative for visual disturbance  Respiratory: Negative for cough, shortness of breath and wheezing  Cardiovascular: Negative for chest pain, palpitations and leg swelling  Gastrointestinal: Negative for abdominal distention, constipation, diarrhea, nausea and vomiting  Genitourinary: Negative for difficulty urinating  Neurological: Negative for syncope and light-headedness  Historical Information   OB History    Para Term  AB Living   3 1 1 0 1 1   SAB TAB Ectopic Multiple Live Births   1 0 0 0 1      # Outcome Date GA Lbr Tray/2nd Weight Sex Delivery Anes PTL Lv   3 Current            2 Term 12/10/14 39w3d  3544 g (7 lb 13 oz) M CS-LTranv EPI N RIYA      Complications: Cephalopelvic Disproportion   1 SAB      SAB           Baby complications/comments: polyhydramnios  Past Medical History:   Diagnosis Date    , spontaneous complete 2017    Transitioned From: Spontaneous , incomplete    Depression 2010    resolved    Migraine     occas  takes med   prn    UTI (urinary tract infection) 2016    Varicella     vaccine     Past Surgical History:   Procedure Laterality Date     SECTION       CPD?  COLPOSCOPY      2016     Social History   History   Alcohol Use No     Comment: none since pregnancy     History   Drug Use No     History   Smoking Status    Former Smoker   Smokeless Tobacco    Never Used     Family History   Problem Relation Age of Onset    Hyperlipidemia Father     Heart disease Father         defect    Heart disease Maternal Grandmother         defect    Cancer Maternal Grandfather         lung    Heart disease Maternal Grandfather         defect    COPD Maternal Grandfather         emphysema    Cancer Paternal Grandmother         lung    Hyperlipidemia Paternal Grandfather     Heart disease Paternal Grandfather         defect       Meds/Allergies   All medications and allergies reviewed  No Known Allergies    Objective   Vitals: Blood pressure 121/68, pulse 77, temperature 98 °F (36 7 °C), temperature source Oral, resp  rate 18, last menstrual period 2018, currently breastfeeding  There is no height or weight on file to calculate BMI  Invasive Devices          No matching active lines, drains, or airways          Physical Exam   Constitutional: She is oriented to person, place, and time  She appears well-developed and well-nourished  No distress  Cardiovascular: Normal rate, regular rhythm and normal heart sounds  Exam reveals no gallop and no friction rub  No murmur heard  Pulmonary/Chest: Effort normal and breath sounds normal  No respiratory distress  Abdominal: Soft  Bowel sounds are normal  She exhibits no distension  There is no tenderness  There is no rebound and no guarding  Neurological: She is alert and oriented to person, place, and time  She has normal reflexes  Skin: She is not diaphoretic  Cervix 6/90/-3 ballottable, bulging membranes  FHT CAT 1  [place] q3-5 min    Prenatal Labs:  I have personally reviewed pertinent reports  Prenatal Labs: I have personally reviewed pertinent reports    , Blood Type:   Lab Results   Component Value Date/Time    ABO Grouping O 06/22/2018 08:29 AM     , D (Rh type):   Lab Results   Component Value Date/Time    Rh Factor Positive 06/22/2018 08:29 AM     , Antibody Screen: No results found for: ANTIBODYSCR , HCT/HGB:   Lab Results   Component Value Date/Time    Hematocrit 38 0 09/20/2018 07:47 AM    Hemoglobin 11 1 (L) 09/20/2018 07:47 AM      , MCV:   Lab Results   Component Value Date/Time     (H) 09/20/2018 07:47 AM      , Platelets:   Lab Results   Component Value Date/Time    Platelets 185 23/68/5161 07:47 AM      , 1 hour Glucola:   Lab Results   Component Value Date/Time    Glucose 126 09/20/2018 07:47 AM   , 3 hour GTT: No results found for: XQPZRXB4WP, Varicella: No results found for: VARICELLAIGG    , Rubella:   Lab Results   Component Value Date/Time    Rubella IgG Quant 130 9 06/22/2018 08:29 AM        , VDRL/RPR:   Lab Results   Component Value Date/Time    RPR Non-Reactive 09/20/2018 07:47 AM      , Urine Culture/Screen:   Lab Results   Component Value Date/Time    Urine Culture 40,000-49,000 cfu/ml  06/22/2018 08:29 AM       , Urine Drug Screen: No results found for: AMPHETUR, BARBTUR, BDZUR, THCUR, COCAINEUR, METHADONEUR, OPIATEUR, PCPUR, MTHAMUR, ECSTASYUR, TRICYCLICSUR, Hep B:   Lab Results   Component Value Date/Time    Hepatitis B Surface Ag Non-reactive 06/22/2018 08:29 AM     , Hep C: No components found for: HEPCSAG, EXTHEPCSAG   , HIV:   Lab Results   Component Value Date/Time    HIV-1/HIV-2 Ab Non-Reactive 06/22/2018 08:30 AM     , Chlamydia: No results found for: EXTCHLAMYDIA  , Gonorrhea:   Lab Results   Component Value Date/Time    N gonorrhoeae, DNA Probe N  gonorrhoeae Amplified DNA Negative 06/14/2018 11:58 AM     , Group B Strep:    Lab Results   Component Value Date/Time    Strep Grp B PCR Negative for Beta Hemolytic Strep Grp B by PCR 11/26/2018 11:19 AM

## 2018-12-26 NOTE — OB LABOR/OXYTOCIN SAFETY PROGRESS
Labor Progress Note Lester Fraga 28 y o  female MRN: [id number]    Unit/Bed#: -01 Encounter: [id number]    Obstetric History       T1      L1     SAB1   TAB0   Ectopic0   Multiple0   Live Births1      Gestational Age: 44w3d     Contraction Frequency (minutes): 1 5-5  Contraction Quality: Strong  Tachysystole: No   Dilation: 10  Dilation Complete Date: 18  Dilation Complete Time: 1550  Effacement (%): 100  Station: 2  Baseline Rate: 125 bpm  Fetal Heart Rate: 130 BPM  FHR Category: Category I          Notes/comments:      Began pushing with patient at 1725  Since 1 hour of pushing there has been excellent maternal effort, however no palpable improvement in fetal station  During pushing +3, between pushes fetal head returns to +2  Difficult to ascertain if fetal position is LOP or NEHEMIAH with asyncliticism  I suspect the latter  Dr Samson Singh notified         Salem Bloch, DO 2018 6:30 PM

## 2018-12-26 NOTE — ANESTHESIA PREPROCEDURE EVALUATION
Review of Systems/Medical History  Patient summary reviewed  Chart reviewed  No history of anesthetic complications     Cardiovascular  Exercise tolerance (METS): >4,     Pulmonary  Smoker ex-smoker  ,        GI/Hepatic            Endo/Other    Obesity    GYN  Currently pregnant , Prior pregnancy/OB history : 3 Parity: 1,     Comment: H/o previous c/s     Hematology   Musculoskeletal       Neurology    Headaches,    Psychology   Anxiety, Depression ,              Physical Exam    Airway    Mallampati score: II  TM Distance: >3 FB  Neck ROM: full     Dental   No notable dental hx     Cardiovascular  Rhythm: regular, Rate: normal, Cardiovascular exam normal    Pulmonary  Pulmonary exam normal Breath sounds clear to auscultation,     Other Findings        Anesthesia Plan  ASA Score- 2     Anesthesia Type- epidural with ASA Monitors  Additional Monitors:   Airway Plan:         Plan Factors-    Induction-     Postoperative Plan-     Informed Consent- Anesthetic plan and risks discussed with patient

## 2018-12-26 NOTE — ANESTHESIA PROCEDURE NOTES
Epidural Block    Patient location during procedure: OB  Start time: 12/26/2018 12:07 PM  Reason for block: procedure for pain and primary anesthetic  Staffing  Anesthesiologist: Anastasia Gil  Performed: anesthesiologist   Preanesthetic Checklist  Completed: patient identified, site marked, surgical consent, pre-op evaluation, timeout performed, IV checked, risks and benefits discussed and monitors and equipment checked  Epidural  Patient position: sitting  Prep: ChloraPrep and site prepped and draped  Patient monitoring: heart rate, continuous pulse ox and frequent blood pressure checks  Approach: midline  Location: lumbar (1-5)  Injection technique: JOSH saline  Needle  Needle type: Tuohy   Epidural needle gauge: 17G    Catheter type: side hole  Catheter size: 16 G  Catheter at skin depth: 11 cm  Test dose: negative (Lidocaine 2% with 1:200,000)  Assessment  Events: pressure with initial injection - resolved immediatelynegative aspiration for CSF, negative aspiration for heme and no paresthesia on injection  patient tolerated the procedure well with no immediate complications

## 2018-12-26 NOTE — DISCHARGE INSTRUCTIONS
WHAT YOU NEED TO KNOW:   A , or  section, is abdominal surgery to deliver your baby  DISCHARGE INSTRUCTIONS:   Call 911 for any of the following:   · You feel lightheaded, short of breath, and have chest pain  · You cough up blood  Seek care immediately if:   · Blood soaks through your bandage  · Your stitches come apart  · Your arm or leg feels warm, tender, and painful  It may look swollen and red  Contact your OB if:   · You have heavy vaginal bleeding that fills 1 or more sanitary pads in 1 hour  · You have a fever  · Your incision is swollen, red, or draining pus  · You have questions or concerns about yourself or your baby  Medicines: You may  need any of the following:  · Prescription pain medicine  may be given  Ask how to take this medicine safely  · Acetaminophen  decreases pain and fever  It is available without a doctor's order  Ask how much to take and how often to take it  Follow directions  Acetaminophen can cause liver damage if not taken correctly  · NSAIDs , such as ibuprofen, help decrease swelling, pain, and fever  NSAIDs can cause stomach bleeding or kidney problems in certain people  If you take blood thinner medicine, always ask your healthcare provider if NSAIDs are safe for you  Always read the medicine label and follow directions  · Take your medicine as directed  Contact your healthcare provider if you think your medicine is not helping or if you have side effects  Tell him or her if you are allergic to any medicine  Keep a list of the medicines, vitamins, and herbs you take  Include the amounts, and when and why you take them  Bring the list or the pill bottles to follow-up visits  Carry your medicine list with you in case of an emergency  Wound care:  Carefully wash your wound with soap and water every day  Keep your wound clean and dry  Wear loose, comfortable clothes that do not rub against your wound   Ask your OB about bathing and showering  Limit activity as directed:   · Ask when it is safe for you to drive, walk up stairs, lift heavy objects, and have sex  · Ask when it is okay to exercise, and what types of exercise to do  Start slowly and do more as you get stronger  Drink liquids as directed:  Liquids help keep you hydrated after your procedure and decrease your risk for a blood clot  Ask how much liquid to drink each day and which liquids are best for you  Follow up with your OB as directed: You may need to return to have your stitches or staples removed  Write down your questions so you remember to ask them during your visits  © 2017 [employer] Information is for End User's use only and may not be sold, redistributed or otherwise used for commercial purposes  All illustrations and images included in CareNotes® are the copyrighted property of [employer]  or [employer]imit[employer] Reyes  The above information is an  only  It is not intended as medical advice for individual conditions or treatments  Talk to your doctor, nurse or pharmacist before following any medical regimen to see if it is safe and effective for you

## 2018-12-26 NOTE — OB LABOR/OXYTOCIN SAFETY PROGRESS
Oxytocin Safety Progress Check Note - Velia Jimenez Ponist 28 y o  female MRN: [id number]    Unit/Bed#: -01 Encounter: [id number]    Obstetric History       T1      L1     SAB1   TAB0   Ectopic0   Multiple0   Live Births1      Gestational Age: 44w3d     Contraction Frequency (minutes): 2-5  Contraction Quality: Moderate  Tachysystole: No   Dilation: 8        Effacement (%): 80  Station: 0  Baseline Rate: 130 bpm  Fetal Heart Rate: 140 BPM  FHR Category: Category I          Notes/comments:      s/p epidural  SROM for clear fluid       Elliott Todd MD 2018 12:37 PM

## 2018-12-26 NOTE — OB LABOR/OXYTOCIN SAFETY PROGRESS
Labor Progress Note Bandar Fraga 28 y o  female MRN: [id number]    Unit/Bed#: -01 Encounter: [id number]    Obstetric History       T1      L1     SAB1   TAB0   Ectopic0   Multiple0   Live Births1      Gestational Age: 44w3d     Contraction Frequency (minutes): 1 5-5  Contraction Quality: Strong  Tachysystole: No   Dilation: 10  Dilation Complete Date: 18  Dilation Complete Time: 1550  Effacement (%): 80  Station: 0  Baseline Rate: 120 bpm  Fetal Heart Rate: 112 BPM  FHR Category: Category I          Notes/comments:   Complete +2 not feeling the urge to push  Will allow to labor down            Chase Interiano MD 2018 4:14 PM

## 2018-12-27 LAB
BASOPHILS # BLD AUTO: 0.02 THOUSANDS/ΜL (ref 0–0.1)
BASOPHILS NFR BLD AUTO: 0 % (ref 0–1)
EOSINOPHIL # BLD AUTO: 0 THOUSAND/ΜL (ref 0–0.61)
EOSINOPHIL NFR BLD AUTO: 0 % (ref 0–6)
ERYTHROCYTE [DISTWIDTH] IN BLOOD BY AUTOMATED COUNT: 13.3 % (ref 11.6–15.1)
HCT VFR BLD AUTO: 27.4 % (ref 34.8–46.1)
HGB BLD-MCNC: 9.2 G/DL (ref 11.5–15.4)
IMM GRANULOCYTES # BLD AUTO: 0.13 THOUSAND/UL (ref 0–0.2)
IMM GRANULOCYTES NFR BLD AUTO: 1 % (ref 0–2)
LYMPHOCYTES # BLD AUTO: 1.03 THOUSANDS/ΜL (ref 0.6–4.47)
LYMPHOCYTES NFR BLD AUTO: 6 % (ref 14–44)
MCH RBC QN AUTO: 29.4 PG (ref 26.8–34.3)
MCHC RBC AUTO-ENTMCNC: 33.6 G/DL (ref 31.4–37.4)
MCV RBC AUTO: 88 FL (ref 82–98)
MONOCYTES # BLD AUTO: 0.97 THOUSAND/ΜL (ref 0.17–1.22)
MONOCYTES NFR BLD AUTO: 5 % (ref 4–12)
NEUTROPHILS # BLD AUTO: 16.34 THOUSANDS/ΜL (ref 1.85–7.62)
NEUTS SEG NFR BLD AUTO: 88 % (ref 43–75)
NRBC BLD AUTO-RTO: 0 /100 WBCS
PLATELET # BLD AUTO: 155 THOUSANDS/UL (ref 149–390)
PMV BLD AUTO: 11.9 FL (ref 8.9–12.7)
RBC # BLD AUTO: 3.13 MILLION/UL (ref 3.81–5.12)
RPR SER QL: NORMAL
WBC # BLD AUTO: 18.49 THOUSAND/UL (ref 4.31–10.16)

## 2018-12-27 PROCEDURE — 99024 POSTOP FOLLOW-UP VISIT: CPT | Performed by: OBSTETRICS & GYNECOLOGY

## 2018-12-27 PROCEDURE — 85025 COMPLETE CBC W/AUTO DIFF WBC: CPT | Performed by: OBSTETRICS & GYNECOLOGY

## 2018-12-27 RX ORDER — CALCIUM CARBONATE 200(500)MG
1000 TABLET,CHEWABLE ORAL DAILY PRN
Status: DISCONTINUED | OUTPATIENT
Start: 2018-12-27 | End: 2018-12-29 | Stop reason: HOSPADM

## 2018-12-27 RX ORDER — DIPHENHYDRAMINE HYDROCHLORIDE 50 MG/ML
25 INJECTION INTRAMUSCULAR; INTRAVENOUS EVERY 6 HOURS PRN
Status: DISCONTINUED | OUTPATIENT
Start: 2018-12-27 | End: 2018-12-29 | Stop reason: HOSPADM

## 2018-12-27 RX ORDER — ACETAMINOPHEN 325 MG/1
650 TABLET ORAL EVERY 4 HOURS PRN
Status: DISCONTINUED | OUTPATIENT
Start: 2018-12-27 | End: 2018-12-29 | Stop reason: HOSPADM

## 2018-12-27 RX ORDER — HYDROMORPHONE HCL/PF 1 MG/ML
1 SYRINGE (ML) INJECTION EVERY 2 HOUR PRN
Status: DISCONTINUED | OUTPATIENT
Start: 2018-12-27 | End: 2018-12-29 | Stop reason: HOSPADM

## 2018-12-27 RX ORDER — OXYCODONE HYDROCHLORIDE AND ACETAMINOPHEN 5; 325 MG/1; MG/1
1 TABLET ORAL EVERY 4 HOURS PRN
Status: DISCONTINUED | OUTPATIENT
Start: 2018-12-27 | End: 2018-12-29 | Stop reason: HOSPADM

## 2018-12-27 RX ORDER — IBUPROFEN 600 MG/1
600 TABLET ORAL EVERY 6 HOURS PRN
Status: DISCONTINUED | OUTPATIENT
Start: 2018-12-27 | End: 2018-12-29 | Stop reason: HOSPADM

## 2018-12-27 RX ORDER — SIMETHICONE 80 MG
80 TABLET,CHEWABLE ORAL 4 TIMES DAILY PRN
Status: DISCONTINUED | OUTPATIENT
Start: 2018-12-27 | End: 2018-12-29 | Stop reason: HOSPADM

## 2018-12-27 RX ORDER — ONDANSETRON 2 MG/ML
4 INJECTION INTRAMUSCULAR; INTRAVENOUS EVERY 8 HOURS PRN
Status: DISCONTINUED | OUTPATIENT
Start: 2018-12-27 | End: 2018-12-29 | Stop reason: HOSPADM

## 2018-12-27 RX ORDER — DOCUSATE SODIUM 100 MG/1
100 CAPSULE, LIQUID FILLED ORAL DAILY PRN
Status: DISCONTINUED | OUTPATIENT
Start: 2018-12-27 | End: 2018-12-29 | Stop reason: HOSPADM

## 2018-12-27 RX ORDER — SODIUM CHLORIDE, SODIUM LACTATE, POTASSIUM CHLORIDE, CALCIUM CHLORIDE 600; 310; 30; 20 MG/100ML; MG/100ML; MG/100ML; MG/100ML
125 INJECTION, SOLUTION INTRAVENOUS CONTINUOUS
Status: DISCONTINUED | OUTPATIENT
Start: 2018-12-27 | End: 2018-12-27 | Stop reason: SDUPTHER

## 2018-12-27 RX ORDER — OXYCODONE HYDROCHLORIDE AND ACETAMINOPHEN 5; 325 MG/1; MG/1
2 TABLET ORAL EVERY 4 HOURS PRN
Status: DISCONTINUED | OUTPATIENT
Start: 2018-12-27 | End: 2018-12-29 | Stop reason: HOSPADM

## 2018-12-27 RX ADMIN — ACETAMINOPHEN 650 MG: 325 TABLET, FILM COATED ORAL at 02:44

## 2018-12-27 RX ADMIN — KETOROLAC TROMETHAMINE 30 MG: 30 INJECTION, SOLUTION INTRAMUSCULAR at 08:50

## 2018-12-27 RX ADMIN — KETOROLAC TROMETHAMINE 30 MG: 30 INJECTION, SOLUTION INTRAMUSCULAR at 15:24

## 2018-12-27 RX ADMIN — ACETAMINOPHEN 650 MG: 325 TABLET, FILM COATED ORAL at 08:50

## 2018-12-27 RX ADMIN — KETOROLAC TROMETHAMINE 30 MG: 30 INJECTION, SOLUTION INTRAMUSCULAR at 21:48

## 2018-12-27 RX ADMIN — DEXAMETHASONE SODIUM PHOSPHATE 8 MG: 4 INJECTION, SOLUTION INTRAMUSCULAR; INTRAVENOUS at 01:41

## 2018-12-27 RX ADMIN — KETOROLAC TROMETHAMINE 30 MG: 30 INJECTION, SOLUTION INTRAMUSCULAR at 02:44

## 2018-12-27 RX ADMIN — DIPHENHYDRAMINE HYDROCHLORIDE 25 MG: 50 INJECTION, SOLUTION INTRAMUSCULAR; INTRAVENOUS at 03:05

## 2018-12-27 RX ADMIN — ACETAMINOPHEN 650 MG: 325 TABLET, FILM COATED ORAL at 15:22

## 2018-12-27 RX ADMIN — SODIUM CHLORIDE, SODIUM LACTATE, POTASSIUM CHLORIDE, AND CALCIUM CHLORIDE 500 ML/HR: .6; .31; .03; .02 INJECTION, SOLUTION INTRAVENOUS at 06:48

## 2018-12-27 RX ADMIN — SODIUM CHLORIDE, SODIUM LACTATE, POTASSIUM CHLORIDE, AND CALCIUM CHLORIDE 125 ML/HR: .6; .31; .03; .02 INJECTION, SOLUTION INTRAVENOUS at 08:58

## 2018-12-27 RX ADMIN — ACETAMINOPHEN 650 MG: 325 TABLET, FILM COATED ORAL at 21:48

## 2018-12-27 RX ADMIN — ONDANSETRON 4 MG: 2 INJECTION INTRAMUSCULAR; INTRAVENOUS at 08:20

## 2018-12-27 NOTE — OB LABOR/OXYTOCIN SAFETY PROGRESS
Labor Progress Note Sona Fraga 28 y o  female MRN: [id number]    Unit/Bed#: -01 Encounter: [id number]    Obstetric History       T1      L1     SAB1   TAB0   Ectopic0   Multiple0   Live Births1      Gestational Age: 44w3d     Contraction Frequency (minutes): 1 5-4 5  Contraction Quality: Strong  Tachysystole: No   Dilation: 10  Dilation Complete Date: 18  Dilation Complete Time: 1550  Effacement (%): 100  Station: 2  Baseline Rate: 135 bpm  Fetal Heart Rate: 142 BPM  FHR Category: Category I          Notes/comments:      Patient examined while pushing  Baby is LOP and therefore likely higher than +2  She pushes with good effort but has not had significant descent in the last hour  She has a narrow pubic arch and a prominent sacrum  Explained that I do not think there is room  Patient has questions regarding what would have happened 200 years ago because the midwife blogs suggest that every woman can delivery vaginally if they just believe in themselves and their body - explained that she would likely have lost her child in labor and potentially would have  herself  Discussed options and my recommendation for  at this time given that I do not believe that successful  will occur  All questions answered  Patient wishes to proceed with  at this time  Discussed risks of surgery including but not limited to bleeding, transfusion, infection, injury to surrounding organs including bowel/bladder, risks of increasing amounts of scar tissue in myometrium  All questions answered  Consent signed  Anesthesia, nursing aware  [facility] OR           Fabio Pedersen MD 2018 7:23 PM

## 2018-12-27 NOTE — PROGRESS NOTES
Progress Note - OB/GYN   Ibrahima Bojorquez 28 y o  female MRN: [id number]  Unit/Bed#: -01 Encounter: [id number]    Assessment:  POD#1 s/p Repeat low transverse  section, stable    Plan:  1  Post-op  - Encourage ambulation  - Encourage breastfeeding  - Hb 1-> 9 2  - Urine output: 67cc/h   - Continue postpartum care  - Anticipate discharge on 2018      Subjective/Objective   Chief Complaint:     POD#1 s/p Repeat low transverse  section    Subjective:     Pain: incisional pain  Tolerating PO: yes  Voiding: phillips in  Flatus: no  BM: no  Ambulating: no  Breastfeeding: Breastfeeding  Chest pain: no  Shortness of breath: no  Leg pain: no  Lochia: minimal    Objective:     Vitals:  Vitals:    18 2330 18 0030 18 0132 18 0536   BP: 91/57 101/60 99/53 96/63   BP Location:       Pulse: 73 76 79 66   Resp: 16 16 16 16   Temp: 98 °F (36 7 °C) 98 1 °F (36 7 °C) 98 1 °F (36 7 °C) 98 2 °F (36 8 °C)   TempSrc: Oral Oral Oral Oral   SpO2: 96% 95% 96% 95%   Weight:   78 6 kg (173 lb 6 oz)    Height:   5' 3" (1 6 m)        Physical Exam:     Physical Exam   Constitutional: She is oriented to person, place, and time  She appears well-developed and well-nourished  No distress  Cardiovascular: Normal rate, regular rhythm and normal heart sounds  Exam reveals no gallop and no friction rub  No murmur heard  Pulmonary/Chest: Effort normal and breath sounds normal  No respiratory distress  She has no wheezes  She has no rales  Abdominal: Soft  She exhibits no distension  There is no tenderness  There is no rebound  Incision intact, no hematoma   Musculoskeletal: Normal range of motion  She exhibits no edema or tenderness  Neurological: She is alert and oriented to person, place, and time  Skin: Skin is warm  She is not diaphoretic  Psychiatric: She has a normal mood and affect   Her behavior is normal      Uterine fundus firm and non-tender, -2 cm below the umbilicus Lab, Imaging and other studies: I have personally reviewed pertinent reports        Lab Results   Component Value Date    WBC 18 49 (H) 12/27/2018    HGB 9 2 (L) 12/27/2018    HCT 27 4 (L) 12/27/2018    MCV 88 12/27/2018     12/27/2018               Jennifer Islas MD  [birth year]

## 2018-12-27 NOTE — ANESTHESIA POSTPROCEDURE EVALUATION
Post-Op Assessment Note      CV Status:  Stable    Mental Status:  Alert and awake    Hydration Status:  Euvolemic    PONV Controlled:  Controlled    Airway Patency:  Patent    Post Op Vitals Reviewed: Yes          Staff: CRNA     Post-op block assessment: site cleaned        BP   106/56   Temp 97 5   Pulse 93   Resp 12   SpO2 98

## 2018-12-27 NOTE — SOCIAL WORK
Breast pump consult  Discussed options with pt  Per pt request, Spectra pump ordered from [facility] via [facility] for tomorrow  No other CM needs noted

## 2018-12-27 NOTE — OP NOTE
OPERATIVE REPORT  PATIENT NAME: General Forrester    :  1985  MRN: [id number]  Pt Location: [facility] L&[facility] OR ROOM 02     Section Procedure Note    Indications:   Arrest of descent    Pre-operative Diagnosis:   40w3d pregnancy  TOLAC  Prior  section x1  Arrest of descent    Post-operative Diagnosis:   RLTCS     Attending: Dr Rosa Gottlieb  Resident: Melvin Tripp DO    Maternal Findings:  Normal uterus  Edematous and high lying bladder  Grossly normal tubes and ovaries bilaterally with mild adhesive disease noted  Minimal difficulty noted from skin to delivery, mild adhesions between the rectus and peritoneum     Findings:  Viable male weighing 8lbs 3oz;  Apgar scores of 9 at one minute and 9 at five minutes  Clear amniotic fluid  Normal placenta with 3-vessel cord    Arterial and Venous Gases:  Umbilical Cord Venous Blood Gas:    Results from last 7 days  Lab Units 18   PH COV  7 342   PCO2 COV mm HG 42 9   HCO3 COV mmol/L 22 7   BASE EXC COV mmol/L -3 0*   O2 CT CD VB mL/dL 6 1   O2 HGB, VENOUS CORD % 04 5     Umbilical Cord Arterial Blood Gas:    Results from last 7 days  Lab Units 18   PH COA  7 299   PCO2 COA  48 7   PO2 COA mm HG 14 7   HCO3 COA mmol/L 23 4   BASE EXC COA mmol/L -3 5*   O2 CONTENT CORD ART ml/dl 6 1   O2 HGB, ARTERIAL CORD % 28 7       Specimens: Arterial and venous cord gases, cord blood, segment of umbilical cord, placenta to storage    Estimated Blood Loss: 700 mL    Fluids: 1 6 L LR    Drains: Castro catheter           Complications:  None; patient tolerated the procedure well  Disposition: PACU            Condition: stable    Procedure Details   General Forrester is a 28 y o  [id number] who was admitted at 40w3d for active labor  She was found to be 6cm on initial triage exam prior to admission  She spontaneously ruptured at 1230 for clear fluid  She became complete at 1550 and started pushing at 1723   After 1 hour of pushing there was arrest of fetal descent  Fetal position was notably LOP and there was also noted to be a prominent sacrum as well as narrow pubic arch  Repeat  section was recommended after discussion between patient and Dr Angelito Singh which included the risks, benefits and alternatives  Patient gave informed consent to proceed with repeat   The patient was taken to the [facility] Operating Room where she received rebolus of her epiduiral anesthesia  Patient's Castro from earlier in labor had remained in place  There was notable blood-tinged fluid in the patient's Castro prior to the procedure  For infection prophylaxis, she received 500mg Azithromycin and 1g Ancef preoperatively  Fetal heart tones in the OR were assessed and noted to be within normal limits and SCDs were placed  The abdomen was prepped with Chloraprep, the vagina was prepped with Betadine, and following appropriate drying time, the patient was draped in the usual sterile manner  A Time Out was held and the above information confirmed  The patient was identified as Chelsea Fraga and the procedure verified as a  Delivery for repeat  section for arrest of descent  A Pfannenstiel incision was made following the prior Pfannenstiel incision scar and carried down through the underlying subcutaneous tissue to the fascia using a scalpel  The rectus fascia was then nicked in the midline and dissected laterally using Huggins scissors  The inferior edge of the  fascial incision was grasped with Kocher clamps bilaterally, tented upward and the underlying rectus muscles were dissected off sharply with Huggins scissors down to the pubic rami  This was repeated upward on the superior edge of the fascia   The rectus muscles were  and the peritoneum was identified, entered, and extended longitudinally with blunt dissection  There was noted to be mild adhesive disease between the peritoneum and the rectus muscles   The bladder was also noted to be very edematous and high lying  Castro was draining properly  Peritoneum was taken down on both sides with Bovie electrocautery  The skin incision was lengthened 2cm bilaterally with the scalpel to provide for adequate delivery space  The bladder blade was inserted  The vesicouterine peritoneum was identified, entered sharply, and dissected laterally with  Metzenbaum scissors to form a bladder flap  The bladder blade was repositioned  A low transverse uterine incision was made with the scalpel and extended laterally with blunt dissection  The amnion was entered bluntly  The fetal head was palpated and notably LOP in position, elevated, and delivered through the uterine incision followed by the body without difficulty  There was noted to be spontaneous cry and good tone  There was no apparent damage or injury to the   The umbilical cord was doubly clamped and cut after 30 seconds to allow for delayed cord clamping  The infant was handed off to the  providers  Arterial and venous cord gases, cord blood, and a segment of umbilical cord were obtained for evaluation  The placenta delivered spontaneously with uterine fundal massage and appeared normal  The uterus was exteriorized and cleaned out with a moist lap sponge  The uterine incision was closed with a running locked suture of 0 Vicryl  A second layer of the same suture was used to imbricate the first horizontally in order to avoid the bladder  Hemostasis was noted to be excellent  Normal saline solution was used to irrigate the posterior culdesac  The uterus was returned to the abdomen  The paracolic gutters were inspected and cleared of all clots and debris with irrigation and moist lap sponges  The fascia was closed with a running suture of 0 Vicryl  Subcutaneous adipose tissue was closed with a running suture of 2-0 Plain gut  The skin was closed with a subcuticular running suture of 4-0 Monocryl    There was noted to be a 2x2cm right superficial hematoma at the pfannenstiel site  Histoacryl and pressure dressings were applied  At the end of the case, the Castro was noted to drain clear yellow urine proximally  The patient appeared to tolerate the procedure very well  Lap sponge, needle, and instrument counts were correct x2  The patient was transferred to her postpartum recovery room in stable condition and her infant went to the  nursery  Attending Attestation: Dr Twin Lindsay was present for the entire procedure      Johnna Ganser, DO  PGY-2 OB/GYN   2018 9:18 PM

## 2018-12-27 NOTE — LACTATION NOTE
This note was copied from a baby's chart  Discussed 2nd night syndrome and ways to calm infant  Hand out given  Information on hand expression given  Discussed benefits of knowing how to manually express breast including stimulating milk supply, softening nipple for latch and evacuating breast in the event of engorgement  Met with mother  Provided mother with Ready, Set, Baby booklet  Discussed Skin to Skin contact an benefits to mom and baby  Talked about the delay of the first bath until baby has adjusted  Spoke about the benefits of rooming in  Feeding on cue and what that means for recognizing infant's hunger  Avoidance of pacifiers for the first month discussed  Talked about exclusive breastfeeding for the first 6 months  Positioning and latch reviewed as well as showing images of other feeding positions  Discussed the properties of a good latch in any position  Reviewed hand/manual expression  Discussed s/s that baby is getting enough milk and some s/s that breastfeeding dyad may need further help  Gave information on common concerns, what to expect the first few weeks after delivery, preparing for other caregivers, and how partners can help  Resources for support also provided  Encouraged parents to watch breastfeeding class in the education area of My Chart Bedside  Power point handout for breastfeeding class in My Chart Bedside given to family so they can follow along and write down questions they may have  Encouraged parents to call for assistance, questions, and concerns about breastfeeding  Extension provided

## 2018-12-28 RX ADMIN — OXYCODONE HYDROCHLORIDE AND ACETAMINOPHEN 1 TABLET: 5; 325 TABLET ORAL at 05:36

## 2018-12-28 RX ADMIN — OXYCODONE HYDROCHLORIDE AND ACETAMINOPHEN 1 TABLET: 5; 325 TABLET ORAL at 11:37

## 2018-12-28 RX ADMIN — IBUPROFEN 600 MG: 600 TABLET ORAL at 19:34

## 2018-12-28 RX ADMIN — DOCUSATE SODIUM 100 MG: 100 CAPSULE, LIQUID FILLED ORAL at 16:16

## 2018-12-28 RX ADMIN — OXYCODONE HYDROCHLORIDE AND ACETAMINOPHEN 2 TABLET: 5; 325 TABLET ORAL at 16:15

## 2018-12-28 RX ADMIN — IBUPROFEN 600 MG: 600 TABLET ORAL at 08:15

## 2018-12-28 RX ADMIN — OXYCODONE HYDROCHLORIDE AND ACETAMINOPHEN 2 TABLET: 5; 325 TABLET ORAL at 20:15

## 2018-12-28 RX ADMIN — ACETAMINOPHEN 650 MG: 325 TABLET, FILM COATED ORAL at 02:50

## 2018-12-28 RX ADMIN — DOCUSATE SODIUM 100 MG: 100 CAPSULE, LIQUID FILLED ORAL at 08:16

## 2018-12-28 NOTE — LACTATION NOTE
This note was copied from a baby's chart  Savanah Yarbrough complains of hard, tender, painful breasts  No areas are reddened  She says she had plugged milk ducts with her first breast feeding relationship  She says that taking a shower and applying heat helped  Worked on positioning and latch to improve efficacy of milk removal so Savanah Yarbrough struggles less with hardened breast areas  Also suggested an aqua K pad to primary RN to help with comfort and release of milk  Encouraged Savanah Yarbrough to call for assistance, questions, and concerns about breastfeeding  Extension provided

## 2018-12-28 NOTE — PROGRESS NOTES
Progress Note - OB/GYN   Joel Mcardle 28 y o  female MRN: [id number]  Unit/Bed#: -01 Encounter: [id number]    Assessment:  Post partum Day #2 s/pRLTCS, stable, baby in room with mom and dad    Plan:  1  Post partum   - Continue routine post partum care  - Encourage ambulation  - Encourage breastfeeding    Subjective/Objective   Chief Complaint:     Post delivery  Patient is doing well  Lochia WNL  Pain well controlled  Patient reports that she is feeling foggy but has only had 1 hour of sleep  She also reports feeling sore  Subjective:     Pain: yes, improved with meds  Tolerating PO: yes  Voiding: yes  Flatus: yes  BM: no  Ambulating: yes  Breastfeeding:  yes  Chest pain: no  Shortness of breath: no  Leg pain: no  Lochia: minimal    Objective:     Vitals: BP 96/57   Pulse 80   Temp 99 3 °F (37 4 °C) (Oral)   Resp 18   Ht 5' 3" (1 6 m)   Wt 78 6 kg (173 lb 6 oz)   LMP 03/18/2018   SpO2 97%   Breastfeeding? Yes   BMI 30 71 kg/m²       Intake/Output Summary (Last 24 hours) at 12/28/18 0656  Last data filed at 12/27/18 2350   Gross per 24 hour   Intake          2718 75 ml   Output             1050 ml   Net          1668 75 ml       Lab Results   Component Value Date    WBC 18 49 (H) 12/27/2018    HGB 9 2 (L) 12/27/2018    HCT 27 4 (L) 12/27/2018    MCV 88 12/27/2018     12/27/2018       Physical Exam:     General: Awake, Alert & Oriented x3  Head: Normocephalic  Atraumatic  CV: Regular rhythm and rate with normal S1/S2  No murmur, rub, or gallop  Respiratory: Equal breath sounds with symmetric chest rise  Clear to auscultation bilaterally  No wheezes, rhonchi, rales, or crackles  Abdomen: Soft, non-distended  No tenderness to palpation  No rigidity  Incision was unable to be examined as she was in the bathroom when resident returned to reassess  MSK: No deformity  No LE edema or tenderness  : Uterine fundus firm and non-tender, at the umbilicus   Lochia minimal       Mirlande Root Tia Diaz MD  12/28/2018  6:56 AM

## 2018-12-29 VITALS
RESPIRATION RATE: 20 BRPM | OXYGEN SATURATION: 100 % | TEMPERATURE: 97.9 F | DIASTOLIC BLOOD PRESSURE: 60 MMHG | HEART RATE: 72 BPM | SYSTOLIC BLOOD PRESSURE: 103 MMHG | WEIGHT: 173.38 LBS | HEIGHT: 63 IN | BODY MASS INDEX: 30.72 KG/M2

## 2018-12-29 PROCEDURE — 99024 POSTOP FOLLOW-UP VISIT: CPT | Performed by: OBSTETRICS & GYNECOLOGY

## 2018-12-29 RX ORDER — ACETAMINOPHEN 325 MG/1
650 TABLET ORAL EVERY 4 HOURS PRN
Qty: 30 TABLET | Refills: 0
Start: 2018-12-29 | End: 2019-01-08 | Stop reason: ALTCHOICE

## 2018-12-29 RX ORDER — DOCUSATE SODIUM 100 MG/1
100 CAPSULE, LIQUID FILLED ORAL DAILY PRN
Qty: 10 CAPSULE | Refills: 0
Start: 2018-12-29 | End: 2019-05-07 | Stop reason: ALTCHOICE

## 2018-12-29 RX ORDER — OXYCODONE HYDROCHLORIDE AND ACETAMINOPHEN 5; 325 MG/1; MG/1
1 TABLET ORAL EVERY 4 HOURS PRN
Qty: 15 TABLET | Refills: 0 | Status: SHIPPED | OUTPATIENT
Start: 2018-12-29 | End: 2019-01-08 | Stop reason: ALTCHOICE

## 2018-12-29 RX ADMIN — OXYCODONE HYDROCHLORIDE AND ACETAMINOPHEN 2 TABLET: 5; 325 TABLET ORAL at 12:16

## 2018-12-29 RX ADMIN — IBUPROFEN 600 MG: 600 TABLET ORAL at 09:28

## 2018-12-29 RX ADMIN — DOCUSATE SODIUM 100 MG: 100 CAPSULE, LIQUID FILLED ORAL at 08:11

## 2018-12-29 RX ADMIN — OXYCODONE HYDROCHLORIDE AND ACETAMINOPHEN 2 TABLET: 5; 325 TABLET ORAL at 00:48

## 2018-12-29 RX ADMIN — IBUPROFEN 600 MG: 600 TABLET ORAL at 03:05

## 2018-12-29 RX ADMIN — OXYCODONE HYDROCHLORIDE AND ACETAMINOPHEN 2 TABLET: 5; 325 TABLET ORAL at 08:11

## 2018-12-29 NOTE — LACTATION NOTE
This note was copied from a baby's chart  Jeremy Gaona says that breastfeeding is going better with positionining learned while at [facility]  She says that the "lumps" in her breasts feel softer after using the heating pad she got last evening  Encouraged her to follow up with breastfeeding medicine physician if she continues to struggle with this issue to see if there are other ways of resolving it  Contact information given for [employer]  Met with mother  Provided mother with Ready, Set, Baby booklet  Discussed Skin to Skin contact an benefits to mom and baby  Talked about the delay of the first bath until baby has adjusted  Spoke about the benefits of rooming in  Feeding on cue and what that means for recognizing infant's hunger  Avoidance of pacifiers for the first month discussed  Talked about exclusive breastfeeding for the first 6 months  Positioning and latch reviewed as well as showing images of other feeding positions  Discussed the properties of a good latch in any position  Reviewed hand/manual expression  Discussed s/s that baby is getting enough milk and some s/s that breastfeeding dyad may need further help  Gave information on common concerns, what to expect the first few weeks after delivery, preparing for other caregivers, and how partners can help  Resources for support also provided  Discussed s/s engorgement and how to manage with medications and cool compresses as well as s/s mastitis and when to contact physician  Encouraged parents to call for assistance, questions, and concerns about breastfeeding  Extension provided

## 2018-12-29 NOTE — PROGRESS NOTES
Progress Note - OB/GYN   David Bottom 28 y o  female MRN: [id number]  Unit/Bed#:  318-01 Encounter: [id number]    Assessment:  Post operative Day #3 s/p RLTCS after TOLAC, stable, baby in room    Plan:  1) Continue routine post partum care   Encourage ambulation   Encourage breastfeeding   Anticipate discharge today     Subjective/Objective   Chief Complaint:     Post delivery  Patient is doing well  Lochia WNL  Pain well controlled  Patient had some concerns about her incision due to the swelling  Subjective:     Pain: yes, cramping, improved with meds  Tolerating PO: yes  Voiding: yes  Flatus: yes  BM: no  Ambulating: yes  Breastfeeding:  yes  Chest pain: no  Shortness of breath: no  Leg pain: no  Lochia: minimal    Objective:     Vitals: /68   Pulse 75   Temp 98 °F (36 7 °C) (Oral)   Resp 18   Ht 5' 3" (1 6 m)   Wt 78 6 kg (173 lb 6 oz)   LMP 03/18/2018   SpO2 100%   Breastfeeding? Yes   BMI 30 71 kg/m²     No intake or output data in the 24 hours ending 12/29/18 0915    Lab Results   Component Value Date    WBC 18 49 (H) 12/27/2018    HGB 9 2 (L) 12/27/2018    HCT 27 4 (L) 12/27/2018    MCV 88 12/27/2018     12/27/2018       Physical Exam:     Gen: AAOx3, NAD  CV: RRR  Lungs: CTA b/l  Abd: Soft, non-tender, non-distended, no rebound or guarding  Incision clean dry intact, mild blanching erythema and edema on inferior border  Not warm to touch, nontender to palpation with no signs of drainage  Uterine fundus firm and non-tender, 1 cm below the umbilicus     Ext: Non tender, 2+ pitting edema    Bianca Farfan MD  12/29/2018  9:15 AM

## 2019-01-02 ENCOUNTER — TELEPHONE (OUTPATIENT)
Dept: OBGYN CLINIC | Facility: MEDICAL CENTER | Age: 34
End: 2019-01-02

## 2019-01-02 NOTE — DISCHARGE SUMMARY
CS Discharge Summary - Tiffany Fraga 28 y o  female MRN: [id number]    Unit/Bed#: -01 Encounter: [id number]    Admission Date: 2018     Discharge Date: 2018    Admitting Diagnosis:   1  IUP at 40w3d  2  TOLAC  3  Prior  section  4  Arrest of descent    Discharge Diagnosis:   Same, delivered    Procedures:   repeat  section, low transverse incision    Admitting Attending: Dr Alta Ca  Delivery Attending: Dr Kortney Davila  Discharge Attending: Santosh Park DO    Consult service: none  Consult attending: none    Hospital Course:     Namita Elder is a 28 y o  [id number] who was admitted at 40w3d for active labor  She was found to be 6cm on exam   She SROM'd at 1230 for clear fluid  She became complete at 1550 and started pushing at 1723  After 1 hour of pushing there was arrest of descent  Fetal position was LOP and there was noted to be a prominent sacrum and narrow pubic arch  Repeat  section was recommended after discussion including the risks, benefits and alternatives  Patient gave informed consent to proceed  She then underwent an uncomplicated  section delivery and delivered a viable male  at 200  APGARS were 9, 9 at 1 and 5 minutes, respectively   weighed 8lb 3oz  Placenta was delivered at [address]   was then transferred to  nursery  Patient tolerated the procedure well and was transferred to recovery in stable condition  The patient's post partum course was unremarkable    Preoperative hemaglobin was 11 1, postoperative was 9 2  Her postoperative pain was well controlled with oral analgesics  On day of discharge, she was ambulating and able to reasonably perform all ADLs  She was voiding and had appropriate bowel function  Pain was well controlled  She was discharged home on post-operative day #3 without complications   Patient was instructed to follow up with her OB as an outpatient and was given appropriate warnings to call provider if she develops signs of infection or uncontrolled pain  Complications:   None    Condition at discharge:   good     Provisions for Follow-Up Care:  See after visit summary for information related to follow-up care and any pertinent home health orders  Disposition:   Home    Planned Readmission:   No    Discharge Medications:   Prenatal vitamin daily for 6 months or the duration of nursing whichever is longer  Motrin 600 mg orally every 6 hours as needed for pain  Tylenol (over the counter) per bottle directions as needed for pain  Hydrocortisone cream 1% (over the counter) applied 1-2x daily to hemorrhoids as needed  Witch hazel pads for hemorrhoidal discomfort as needed      Discharge instructions :   -Do not place anything (no partner, tampons or douche) in your vagina for 6 weeks  -You may walk for exercise for the first 6 weeks then gradually return to your usual activities    -Please do not drive for 1 week if you have no stitches and for 2 weeks if you have stitches or underwent a  delivery     -You may take baths or shower per your preference    -Please look at your bust (breasts) in the mirror daily and call provider for redness or tenderness or increased warmth  - If you have had a  please look at your incision daily as well and call provider for increasing redness or steady drainage from the incision    -Please call your provider if temperature > 100 4*F or 38* C, worsening pain or a foul discharge      Hakan Angelo DO  PGY-2 OB/GYN   2019 6:33 AM

## 2019-01-02 NOTE — TELEPHONE ENCOUNTER
Pt had no edema prior to c section  Has ankle edema on up to groin  Feet hurt, (Was on feet this AM )  Knows about no salt, elevate feet etc  Said ankles are worse than after she left hospital   Was told the first pp appt with you is 17th  She wants to be seen sooner because of ankles  Bleeding minimal, bowels and bladder ok    Thanks

## 2019-01-02 NOTE — TELEPHONE ENCOUNTER
Please reassure patient that this is common after  related to the fluids administered during the surgery  If she would like to be evaluated sooner, please see if someone has an opening before the weekend        Thanks!!

## 2019-01-02 NOTE — TELEPHONE ENCOUNTER
Patient delivered on  by  with Dr Murray Doran  Patient is experiencing some pain and swelling and had questions regarding an appointment

## 2019-01-03 LAB — PLACENTA IN STORAGE: NORMAL

## 2019-01-05 ENCOUNTER — HOSPITAL ENCOUNTER (EMERGENCY)
Facility: HOSPITAL | Age: 34
Discharge: HOME | End: 2019-01-05
Attending: EMERGENCY MEDICINE | Admitting: EMERGENCY MEDICINE
Payer: COMMERCIAL

## 2019-01-05 ENCOUNTER — APPOINTMENT (EMERGENCY)
Dept: ULTRASOUND IMAGING | Facility: HOSPITAL | Age: 34
End: 2019-01-05
Payer: COMMERCIAL

## 2019-01-05 VITALS
TEMPERATURE: 99 F | OXYGEN SATURATION: 98 % | HEART RATE: 59 BPM | SYSTOLIC BLOOD PRESSURE: 117 MMHG | RESPIRATION RATE: 16 BRPM | DIASTOLIC BLOOD PRESSURE: 63 MMHG

## 2019-01-05 DIAGNOSIS — R60.9 DEPENDENT EDEMA: Primary | ICD-10-CM

## 2019-01-05 PROCEDURE — 93970 EXTREMITY STUDY: CPT | Performed by: SURGERY

## 2019-01-05 PROCEDURE — 93970 EXTREMITY STUDY: CPT

## 2019-01-05 PROCEDURE — 99283 EMERGENCY DEPT VISIT LOW MDM: CPT

## 2019-01-05 NOTE — DISCHARGE INSTRUCTIONS
Leg Edema   WHAT YOU NEED TO KNOW:   Leg edema is swelling caused by fluid buildup  Your legs may swell if you sit or stand for long periods of time, are pregnant, or are injured  Swelling may also occur if you have heart failure or circulation problems  This means that your heart does not pump blood through your body as it should  DISCHARGE INSTRUCTIONS:   Self-care:   · Elevate your legs:  Raise your legs above the level of your heart as often as you can  This will help decrease swelling and pain  Prop your legs on pillows or blankets to keep them elevated comfortably  · Wear pressure stockings: These tight stockings put pressure on your legs to promote blood flow and prevent blood clots  Wear the stockings during the day  Do not wear them while you sleep  · Apply heat:  Heat helps decrease pain and swelling  Apply heat on the area for 20 to 30 minutes every 2 hours for as many days as directed  · Stay active:  Do not stand or sit for long periods of time  Ask your healthcare provider about the best exercise plan for you  · Eat healthy foods:  Healthy foods include fruits, vegetables, whole-grain breads, low-fat dairy products, beans, lean meats, and fish  Ask if you need to be on a special diet  Limit salt  Salt will make your body hold even more fluid  Follow up with your healthcare provider as directed:  Write down your questions so you remember to ask them during your visits  Contact your healthcare provider if:   · You have a fever or feel more tired than usual     · The veins in your legs look larger than usual  They may look full or bulging  · Your legs itch or feel heavy  · You have red or white areas or sores on your legs  The skin may also appear dimpled or have indentations  · You are gaining weight  · You have trouble moving your ankles  · The swelling does not go away, or other parts of your body swell      · You have questions or concerns about your condition or care   Return to the emergency department if:   · You cannot walk  · You feel faint or confused  · Your skin turns blue or gray  · Your leg feels warm, tender, and painful  It may be swollen and red  · You have chest pain or trouble breathing that is worse when you lie down  · You suddenly feel lightheaded and have trouble breathing  · You have new and sudden chest pain  You may have more pain when you take deep breaths or cough  You may also cough up blood  © 2017 [employer]  Information is for End User's use only and may not be sold, redistributed or otherwise used for commercial purposes  All illustrations and images included in CareNotes® are the copyrighted property of [employer]  or Dimitris Reyes  The above information is an  only  It is not intended as medical advice for individual conditions or treatments  Talk to your doctor, nurse or pharmacist before following any medical regimen to see if it is safe and effective for you

## 2019-01-05 NOTE — ED PROVIDER NOTES
History  Chief Complaint   Patient presents with    Leg Swelling     Pt states she had a  one week ago, states 2 days ago she started w/ bilateral leg swelling and "constant headaches"        History provided by:  Patient   used: No     79-year-old otherwise healthy female who was about 9 days postpartum via  presented for evaluation of bilateral leg swelling and soreness and beginning 2 days ago  She notes that it has actually improved a little bit since yesterday  She feels a little bit short of breath when lying flat but otherwise asymptomatic  No history of DVT  No family history of DVT  No complications with pregnancy  No preeclampsia  She is well-appearing here with normal vitals including blood pressure  She has some calf and thigh tenderness and generalized nonpitting edema  Plan bilateral duplex  Prior to Admission Medications   Prescriptions Last Dose Informant Patient Reported? Taking? Prenatal Vit-Fe Fumarate-FA (PRENATAL VITAMINS PLUS PO)  Self Yes No   Sig: Take by mouth   acetaminophen (TYLENOL) 325 mg tablet   No No   Sig: Take 2 tablets (650 mg total) by mouth every 4 (four) hours as needed for mild pain   docusate sodium (COLACE) 100 mg capsule   No No   Sig: Take 1 capsule (100 mg total) by mouth daily as needed for constipation   oxyCODONE-acetaminophen (PERCOCET) 5-325 mg per tablet   No No   Sig: Take 1 tablet by mouth every 4 (four) hours as needed for moderate pain for up to 15 days Earliest Fill Date: 18 Max Daily Amount: 6 tablets   ranitidine (ZANTAC) 150 MG capsule  Self Yes No   Sig: Take 75 mg by mouth daily at bedtime        Facility-Administered Medications: None       Past Medical History:   Diagnosis Date    , spontaneous complete 2017    Transitioned From: Spontaneous , incomplete    Depression     resolved    Migraine     occas  takes med   prn    UTI (urinary tract infection) 2016    Varicella     vaccine       Past Surgical History:   Procedure Laterality Date     SECTION      2014 CPD?  COLPOSCOPY      2016    CA  DELIVERY ONLY N/A 2018    Procedure: Dandy Pineda () REPEAT;  Surgeon: Heidi Richey MD;  [facility] ;  Service: Obstetrics       Family History   Problem Relation Age of Onset    Hyperlipidemia Father     Heart disease Father         defect    Heart disease Maternal Grandmother         defect    Cancer Maternal Grandfather         lung    Heart disease Maternal Grandfather         defect    COPD Maternal Grandfather         emphysema    Cancer Paternal Grandmother         lung    Hyperlipidemia Paternal Grandfather     Heart disease Paternal Grandfather         defect     I have reviewed and agree with the history as documented  Social History   Substance Use Topics    Smoking status: Former Smoker    Smokeless tobacco: Never Used    Alcohol use No      Comment: none since pregnancy        Review of Systems   Constitutional: Negative for activity change, appetite change, fatigue and fever  Respiratory: Negative for chest tightness and shortness of breath  Cardiovascular: Positive for leg swelling  Negative for chest pain  Genitourinary: Negative for difficulty urinating and dysuria  Musculoskeletal: Negative for back pain and neck pain  Skin: Negative for color change  Neurological: Negative for dizziness and weakness  All other systems reviewed and are negative  Physical Exam  Physical Exam   Constitutional: She appears well-developed and well-nourished  No distress  HENT:   Head: Normocephalic  Mouth/Throat: Oropharynx is clear and moist    Cardiovascular: Normal rate and regular rhythm  Pulmonary/Chest: Effort normal  No respiratory distress  Genitourinary:   Genitourinary Comments: Nonpitting edema of both legs  Calf tenderness  Musculoskeletal: Normal range of motion     Skin: Skin is warm and dry  Psychiatric: She has a normal mood and affect  Her behavior is normal    Nursing note and vitals reviewed  Vital Signs  ED Triage Vitals   Temperature Pulse Respirations Blood Pressure SpO2   19 1433 19 1433 19 1433 19 1433 19 1433   99 °F (37 2 °C) 59 16 117/63 98 %      Temp Source Heart Rate Source Patient Position - Orthostatic VS BP Location FiO2 (%)   19 1433 19 1433 19 1433 19 1433 --   Oral Monitor Sitting Left arm       Pain Score       19 1535       7           Vitals:    19 1433   BP: 117/63   Pulse: 59   Patient Position - Orthostatic VS: Sitting       Visual Acuity  Visual Acuity      Most Recent Value   L Pupil Size (mm)  3   R Pupil Size (mm)  3          ED Medications  Medications - No data to display    Diagnostic Studies  Results Reviewed     None                 VAS lower limb venous duplex study, complete bilateral    (Results Pending)              Procedures  Procedures       Phone Contacts  ED Phone Contact    ED Course                               MDM  Number of Diagnoses or Management Options  Dependent edema: new and requires workup  Diagnosis management comments: 50-year-old female who was 9 days postpartum via  presented with bilateral leg swelling over the last few days  She actually noted that it had been worse yesterday and has been improving  She has a little bit calf tenderness  Bilateral duplex normal   Patient has no other ongoing symptoms  Likely dependent edema due to pregnancy, hospitalization  Stable for discharge  Advised to return with any worsening symptoms         Amount and/or Complexity of Data Reviewed  Tests in the radiology section of CPT®: ordered and reviewed    Patient Progress  Patient progress: stable    CritCare Time    Disposition  Final diagnoses:   Dependent edema     Time reflects when diagnosis was documented in both MDM as applicable and the Disposition within this note     Time User Action Codes Description Comment    1/5/2019  5:05 PM Shanice NUÑEZ Add [R60 9] Peripheral edema     1/5/2019  5:05 PM Wilmar Carter Remove [R60 9] Peripheral edema     1/5/2019  5:05 PM Julián Carter Add [R60 9] Dependent edema       ED Disposition     ED Disposition Condition Comment    Discharge  Chelsea Fraga discharge to home/self care  Condition at discharge: Good        Follow-up Information     Follow up With Specialties Details Why Contact Info Additional [address], MD Family Medicine   [address] [facility]  [address] [place] [address] Emergency Department Emergency Medicine  If symptoms worsen [address] [facility]  AN ED, [address], [place], [place], [address]          Patient's Medications   Discharge Prescriptions    No medications on file     No discharge procedures on file      ED Provider  Electronically Signed by           John Kim MD  01/05/19 [id number]

## 2019-01-07 ENCOUNTER — TELEPHONE (OUTPATIENT)
Dept: OBGYN CLINIC | Facility: MEDICAL CENTER | Age: 34
End: 2019-01-07

## 2019-01-07 NOTE — TELEPHONE ENCOUNTER
C section by you on 26th  Was in ED 2 days ago with peripheral edema  Said abdomen also swollen and as of yesterday is bleeding dark blood from 1 spot on incision  It is black and blue at that spot  Has pretty much dark drainage   Has a pad over it  Seroma? No pus   Did not notice bruise till yesterday when it began draining    Jean Fraga  TT to ED

## 2019-01-07 NOTE — TELEPHONE ENCOUNTER
Pt will see ED tomorrow in [facility] office   No appts anywhere - except CV - long distance to travel from [place]  chante

## 2019-01-08 ENCOUNTER — OFFICE VISIT (OUTPATIENT)
Dept: OBGYN CLINIC | Facility: CLINIC | Age: 34
End: 2019-01-08

## 2019-01-08 VITALS — SYSTOLIC BLOOD PRESSURE: 102 MMHG | DIASTOLIC BLOOD PRESSURE: 68 MMHG | BODY MASS INDEX: 26.22 KG/M2 | WEIGHT: 148 LBS

## 2019-01-08 PROCEDURE — 99024 POSTOP FOLLOW-UP VISIT: CPT | Performed by: OBSTETRICS & GYNECOLOGY

## 2019-01-08 NOTE — PROGRESS NOTES
Radha Sneed [place]  1985    S:  28 y o  female here for postpartum visit  She is s/p  (repeat for failed TOLAC) on 2018  Gender: male   Apgars:   Weight: 8 lbs 3 oz  Her lochia has resolved  She is Breastfeeding without problems  Her pregnancy was complicated by: failed TOLAC  She denies postpartum blues/depression  We discussed contraceptive options in detail including birth control pills, patches, NuvaRing, DepoProvera, Mirena/Kyleena IUD, Nexplanon in detail  At this point she would like condoms  O:   She appears well and in no distress  Abdomen is soft and nontender; well-healed Pfannenstiel, no evidence of seroma or wound separation    A/P:  Postpartum visit  She will return in 2-3 months for her yearly exam, sooner PRN

## 2019-05-07 ENCOUNTER — ANNUAL EXAM (OUTPATIENT)
Dept: OBGYN CLINIC | Facility: CLINIC | Age: 34
End: 2019-05-07
Payer: COMMERCIAL

## 2019-05-07 VITALS
BODY MASS INDEX: 25.61 KG/M2 | HEIGHT: 64 IN | DIASTOLIC BLOOD PRESSURE: 78 MMHG | WEIGHT: 150 LBS | SYSTOLIC BLOOD PRESSURE: 118 MMHG

## 2019-05-07 DIAGNOSIS — Z01.419 ENCOUNTER FOR GYNECOLOGICAL EXAMINATION WITHOUT ABNORMAL FINDING: Primary | ICD-10-CM

## 2019-05-07 PROBLEM — O40.3XX0 POLYHYDRAMNIOS IN THIRD TRIMESTER: Status: RESOLVED | Noted: 2018-11-26 | Resolved: 2019-05-07

## 2019-05-07 PROBLEM — Z3A.40 40 WEEKS GESTATION OF PREGNANCY: Status: RESOLVED | Noted: 2018-12-26 | Resolved: 2019-05-07

## 2019-05-07 PROBLEM — Z34.90 SUPERVISION OF NORMAL PREGNANCY: Status: RESOLVED | Noted: 2018-11-16 | Resolved: 2019-05-07

## 2019-05-07 PROBLEM — Z34.83 MULTIGRAVIDA IN THIRD TRIMESTER: Status: RESOLVED | Noted: 2018-06-14 | Resolved: 2019-05-07

## 2019-05-07 PROBLEM — O34.219 PREGNANCY WITH HISTORY OF CESAREAN SECTION, ANTEPARTUM: Status: RESOLVED | Noted: 2018-06-14 | Resolved: 2019-05-07

## 2019-05-07 PROBLEM — Z98.891 STATUS POST REPEAT LOW TRANSVERSE CESAREAN SECTION: Status: RESOLVED | Noted: 2018-12-26 | Resolved: 2019-05-07

## 2019-05-07 PROCEDURE — 99395 PREV VISIT EST AGE 18-39: CPT | Performed by: OBSTETRICS & GYNECOLOGY

## 2020-01-12 ENCOUNTER — OFFICE VISIT (OUTPATIENT)
Dept: URGENT CARE | Facility: MEDICAL CENTER | Age: 35
End: 2020-01-12
Payer: COMMERCIAL

## 2020-01-12 VITALS
OXYGEN SATURATION: 100 % | HEART RATE: 89 BPM | RESPIRATION RATE: 16 BRPM | TEMPERATURE: 99.2 F | BODY MASS INDEX: 26.05 KG/M2 | SYSTOLIC BLOOD PRESSURE: 106 MMHG | WEIGHT: 147 LBS | DIASTOLIC BLOOD PRESSURE: 62 MMHG | HEIGHT: 63 IN

## 2020-01-12 DIAGNOSIS — M54.6 ACUTE MIDLINE THORACIC BACK PAIN: Primary | ICD-10-CM

## 2020-01-12 DIAGNOSIS — K52.9 GASTROENTERITIS: ICD-10-CM

## 2020-01-12 PROCEDURE — 99283 EMERGENCY DEPT VISIT LOW MDM: CPT | Performed by: PHYSICIAN ASSISTANT

## 2020-01-12 PROCEDURE — G0382 LEV 3 HOSP TYPE B ED VISIT: HCPCS | Performed by: PHYSICIAN ASSISTANT

## 2020-01-12 RX ORDER — KETOROLAC TROMETHAMINE 30 MG/ML
60 INJECTION, SOLUTION INTRAMUSCULAR; INTRAVENOUS ONCE
Status: DISCONTINUED | OUTPATIENT
Start: 2020-01-12 | End: 2020-01-12

## 2020-01-12 RX ORDER — CYCLOBENZAPRINE HCL 5 MG
5 TABLET ORAL 3 TIMES DAILY PRN
Qty: 10 TABLET | Refills: 0 | Status: SHIPPED | OUTPATIENT
Start: 2020-01-12 | End: 2020-07-20 | Stop reason: ALTCHOICE

## 2020-01-12 RX ORDER — ONDANSETRON 4 MG/1
4 TABLET, FILM COATED ORAL EVERY 8 HOURS PRN
Qty: 20 TABLET | Refills: 0 | Status: SHIPPED | OUTPATIENT
Start: 2020-01-12 | End: 2020-07-20 | Stop reason: ALTCHOICE

## 2020-01-12 RX ORDER — KETOROLAC TROMETHAMINE 30 MG/ML
30 INJECTION, SOLUTION INTRAMUSCULAR; INTRAVENOUS ONCE
Status: COMPLETED | OUTPATIENT
Start: 2020-01-12 | End: 2020-01-12

## 2020-01-12 RX ADMIN — KETOROLAC TROMETHAMINE 30 MG: 30 INJECTION, SOLUTION INTRAMUSCULAR; INTRAVENOUS at 13:00

## 2020-01-12 NOTE — PROGRESS NOTES
[employer] [facility]        NAME: Gail Lockhart is a 27 y o  female  : 1985    MRN: [id number]  DATE: 2020  TIME: 1:10 PM    Assessment and Plan   Acute midline thoracic back pain [M54 6]  1  Acute midline thoracic back pain  ondansetron (ZOFRAN) 4 mg tablet    cyclobenzaprine (FLEXERIL) 5 mg tablet    ketorolac (TORADOL) injection 30 mg    DISCONTINUED: ketorolac (TORADOL) injection 60 mg   2  Gastroenteritis           Patient Instructions   Toradol shot given in office to help with back pain  Zofran called into pharmacy as well as flexeril  Patient instructed not to start flexeril till she is feeling better  Discussed at risk for dehydration  She is to take Zofran and see if she can start keeping Gatorade or Pedialyte down  If not able to keep some fluids down in the next 2-3 hours patient will need to proceed to the ER  If she develops chest pain, shortness of breath, uncontrollable vomiting, numbness/tingling in lower extremities or loss of bowel bladder function she will need to report to the ER  Follow up with PCP in 3-5 days  Proceed to  ER if symptoms worsen  Chief Complaint     Chief Complaint   Patient presents with    Vomiting     x last night    Diarrhea     x last night    Generalized Body Aches         History of Present Illness       HPI  This is a 27year old female who presents with vomiting and diarrhea since last night  Patient notes when vomiting she threw out her back  She noes she can not keep any food or fluid down  She c/o chills  She notes occassional abdominal pain  Child was sick with vomiting yesterday  Denies chest pain, shortness of breath, sore throat or ear pain  Rates her back pain a 7/10  Denies any numbness or tingling in her legs or loss of bowel or bladder function  Menses ended last week   Review of Systems   Review of Systems   Constitutional: Positive for chills  HENT: Negative for congestion and ear pain      Respiratory: Negative for cough and shortness of breath  Cardiovascular: Negative for chest pain and palpitations  Gastrointestinal: Positive for diarrhea, nausea and vomiting  Negative for abdominal pain  Musculoskeletal: Positive for back pain  Current Medications       Current Outpatient Medications:     cyclobenzaprine (FLEXERIL) 5 mg tablet, Take 1 tablet (5 mg total) by mouth 3 (three) times a day as needed for muscle spasms, Disp: 10 tablet, Rfl: 0    ondansetron (ZOFRAN) 4 mg tablet, Take 1 tablet (4 mg total) by mouth every 8 (eight) hours as needed for nausea or vomiting, Disp: 20 tablet, Rfl: 0  No current facility-administered medications for this visit  Current Allergies     Allergies as of 2020    (No Known Allergies)            The following portions of the patient's history were reviewed and updated as appropriate: allergies, current medications, past family history, past medical history, past social history, past surgical history and problem list      Past Medical History:   Diagnosis Date    , spontaneous complete 2017    Transitioned From: Spontaneous , incomplete    Depression     resolved    Migraine     occas  takes med  prn    UTI (urinary tract infection) 2016    Varicella     vaccine       Past Surgical History:   Procedure Laterality Date     SECTION      2014 CPD?     COLPOSCOPY      2016    NM  DELIVERY ONLY N/A 2018    Procedure: Tatyana Ines () REPEAT;  Surgeon: Dylan Jones MD;  Location: [facility] ;  Service: Obstetrics       Family History   Problem Relation Age of Onset    Hyperlipidemia Father     Heart disease Father         defect    Heart disease Maternal Grandmother         defect    Cancer Maternal Grandfather         lung    Heart disease Maternal Grandfather         defect    COPD Maternal Grandfather         emphysema    Cancer Paternal Grandmother         lung    Hyperlipidemia Paternal Grandfather  Heart disease Paternal Grandfather         defect    No Known Problems Mother     No Known Problems Brother          Medications have been verified  Objective   /62   Pulse 89   Temp 99 2 °F (37 3 °C)   Resp 16   Ht 5' 3" (1 6 m)   Wt 66 7 kg (147 lb)   SpO2 100%   BMI 26 04 kg/m²        Physical Exam     Physical Exam   Constitutional: She is oriented to person, place, and time  She appears well-developed and well-nourished  Cardiovascular: Normal rate, regular rhythm and normal heart sounds  Pulmonary/Chest: Effort normal and breath sounds normal    Abdominal: Soft  Bowel sounds are normal  She exhibits no distension and no mass  There is no tenderness  There is no rebound and no guarding  Musculoskeletal:        Right shoulder: She exhibits spasm  She exhibits no swelling  Arms:  Tenderness of the paraspinal muscles along thoracic and lumbar regions  Strength of lower extremities 5/5  ROM normal in all directions  Neurological: She is alert and oriented to person, place, and time  Skin: Skin is warm and dry  Psychiatric: She has a normal mood and affect  Nursing note and vitals reviewed

## 2020-07-16 ENCOUNTER — TELEPHONE (OUTPATIENT)
Dept: OBGYN CLINIC | Facility: CLINIC | Age: 35
End: 2020-07-16

## 2020-07-16 NOTE — TELEPHONE ENCOUNTER
Pt had a miscarriage with last pregnancy ( at about this time)  Pt had cramping when she left chiropractors office and it lasted till earlier today  Her breasts are no longer sore  I told pt, theses sx may come and go in early preg - relax, go for u/s Mon   Call is bleeding or unusual pain, etc

## 2020-07-20 ENCOUNTER — ULTRASOUND (OUTPATIENT)
Dept: OBGYN CLINIC | Facility: CLINIC | Age: 35
End: 2020-07-20
Payer: COMMERCIAL

## 2020-07-20 VITALS
BODY MASS INDEX: 25.36 KG/M2 | SYSTOLIC BLOOD PRESSURE: 104 MMHG | WEIGHT: 143.13 LBS | DIASTOLIC BLOOD PRESSURE: 60 MMHG | HEIGHT: 63 IN | TEMPERATURE: 98.1 F

## 2020-07-20 DIAGNOSIS — Z34.00 ENCOUNTER FOR SUPERVISION PREGNANCY IN PRIMIGRAVIDA, ANTEPARTUM: ICD-10-CM

## 2020-07-20 DIAGNOSIS — O36.80X1 ENCOUNTER TO DETERMINE FETAL VIABILITY OF PREGNANCY, FETUS 1: Primary | ICD-10-CM

## 2020-07-20 PROBLEM — O34.219 PREGNANCY WITH HISTORY OF CESAREAN SECTION, ANTEPARTUM: Status: ACTIVE | Noted: 2020-07-20

## 2020-07-20 PROCEDURE — 76817 TRANSVAGINAL US OBSTETRIC: CPT | Performed by: OBSTETRICS & GYNECOLOGY

## 2020-07-20 NOTE — ASSESSMENT & PLAN NOTE
G4 P 2 0 1 2 with SENA 3/1/2021 by ultrasound, unclear LMP    Prior csection x 2  Discussed recommended repeat csection at 39 weeks  Prior deliveries both got to fully dilated, pushed for 2-3 hours then had csection  Patient may wish to seek second option about option of laboring

## 2020-07-20 NOTE — PATIENT INSTRUCTIONS
Pregnancy at [address] to [address]:   What changes are happening in my body? Pregnancy hormones may cause your body to go through many changes during this stage of your pregnancy  You may have any of the following signs and symptoms:  · Fatigue    · Tender, swollen breasts    · Nausea or vomiting (morning sickness)    · Mood swings    · Frequent urination     · Headache    · Heartburn or constipation    · Weight gain or loss    · Cravings for certain foods or dislike of foods you normally eat  How do I care for myself at this stage of my pregnancy? · Manage nausea and vomiting  Avoid fatty and spicy foods  Eat small meals throughout the day instead of large meals  Keyana may help to decrease nausea  Ask your healthcare provider about other ways of decreasing nausea and vomiting  · Eat a variety of healthy foods  Healthy foods include fruits, vegetables, whole-grain breads, low-fat dairy foods, beans, lean meats, and fish  Drink liquids as directed  Ask how much liquid to drink each day and which liquids are best for you  Limit caffeine to less than 200 milligrams each day  Limit your intake of fish to 2 servings each week  Choose fish low in mercury such as canned light tuna, shrimp, salmon, cod, or tilapia  Do not  eat fish high in mercury such as swordfish, tilefish, chante mackerel, and shark  · Take prenatal vitamins as directed  Your need for certain vitamins and minerals, such as folic acid, increases during pregnancy  Prenatal vitamins provide some of the extra vitamins and minerals you need  Prenatal vitamins may also help to decrease the risk of certain birth defects  · Ask how much weight you should gain each month  Too much or too little weight gain can be unhealthy for you and your baby  · Do not smoke  If you smoke, it is never too late to quit  Smoking increases your risk of a miscarriage and other health problems during your pregnancy   Smoking can cause your baby to be born too early or weigh less at birth  Ask your healthcare provider for information if you need help quitting  · Do not drink alcohol  Alcohol passes from your body to your baby through the placenta  It can affect your baby's brain development and cause fetal alcohol syndrome (FAS)  FAS is a group of conditions that causes mental, behavior, and growth problems  · Talk to your healthcare provider before you take any medicines  Many medicines may harm your baby if you take them when you are pregnant  Do not take any medicines, vitamins, herbs, or supplements without first talking to your healthcare provider  Never use illegal or street drugs (such as marijuana or cocaine) while you are pregnant  What are some safety tips during pregnancy? · Avoid hot tubs and saunas  Do not use a hot tub or sauna while you are pregnant, especially during your first trimester  Hot tubs and saunas may raise your baby's temperature and increase the risk of birth defects  · Avoid toxoplasmosis  This is an infection caused by eating raw meat or being around infected cat feces  It can cause birth defects, miscarriages, and other problems  Wash your hands after you touch raw meat  Make sure any meat is well-cooked before you eat it  Avoid raw eggs and unpasteurized milk  Use gloves or ask someone else to clean your cat's litter box while you are pregnant  What changes are happening with my baby? By 10 weeks, your baby will be about 2 ½ inches long from the top of the head to the rump (baby's bottom)  Your baby weighs about ½ ounce  Major body organs, such as the brain, heart, and lungs, are forming  Your baby's facial features are also starting to form  What do I need to know about prenatal care? Prenatal care is a series of visits with your healthcare provider throughout your pregnancy  During the first 28 weeks of your pregnancy, you will see your healthcare provider once a month   Prenatal care can help prevent problems during pregnancy and childbirth  Your healthcare provider will ask questions about your health and any previous pregnancies you have had  He will also ask about any medicines you are taking  You may also need any of the following:  · A pap smear  will be done to check your cervix for abnormal cells  The cervix is the narrow opening at the bottom of your uterus  The cervix meets the top part of the vagina  · A pelvic exam  allows your healthcare provider to see your cervix (the bottom part of your uterus)  Your healthcare provider uses a speculum to gently open your vagina  He will check the size and shape of your uterus  · Blood tests  may be done to check for anemia or blood type  Your healthcare provider may also order other blood tests to check if you are immune to certain diseases such as Hepatitis B  He may also recommend an HIV test     · Urine tests  may also be done to check for signs of infection  · Your blood pressure and weight  will be checked  When should I seek immediate care? · You have pain or cramping in your abdomen or low back  · You have heavy vaginal bleeding or clotting  · You pass material that looks like tissue or large clots  Collect the material and bring it with you  When should I contact my healthcare provider? · You have light bleeding  · You have chills or a fever  · You have vaginal itching, burning, or pain  · You have yellow, green, white, or foul-smelling vaginal discharge  · You have pain or burning when you urinate, less urine than usual, or pink or bloody urine  · You have questions or concerns about your condition or care  CARE AGREEMENT:   You have the right to help plan your care  Learn about your health condition and how it may be treated  Discuss treatment options with your caregivers to decide what care you want to receive  You always have the right to refuse treatment  The above information is an  only   It is not intended as medical advice for individual conditions or treatments  Talk to your doctor, nurse or pharmacist before following any medical regimen to see if it is safe and effective for you  © 2017 [address] [employer] Information is for End User's use only and may not be sold, redistributed or otherwise used for commercial purposes  All illustrations and images included in CareNotes® are the copyrighted property of A D A M [employer]  or Dimitris Reyes

## 2020-07-20 NOTE — PROGRESS NOTES
Assessment/Plan:    Encounter for supervision pregnancy in primigravida, antepartum  G4 P 2 0 1 2 with SENA 3/1/2021 by ultrasound, unclear LMP    Prior csection x 2  Discussed recommended repeat csection at 39 weeks  Prior deliveries both got to fully dilated, pushed for 2-3 hours then had csection  Patient may wish to seek second option about option of laboring  Diagnoses and all orders for this visit:    Encounter to determine fetal viability of pregnancy, fetus 1  -     AMB US OB < 14 weeks single or first gestation level 1    Encounter for supervision pregnancy in primigravida, antepartum    Other orders  -     Prenat w/o Q-QD-Mojrmbn-FA-DHA (PNV-DHA PO); Take by mouth          Subjective:      Patient ID: Robe Baer is a 38 y o  female  Patient here for early pregnancy ultrasound  Patient lmp was 20      38year old G4 P 2 0 1 2 @ unknown EGA  Does not remember when her menses was  Overall feels well  History two prior csections  Advanced maternal age        The following portions of the patient's history were reviewed and updated as appropriate: She  has a past medical history of , spontaneous complete (2017), Depression (), Migraine, UTI (urinary tract infection) (2016), and Varicella  She   Patient Active Problem List    Diagnosis Date Noted    Pregnancy with history of  section, antepartum 2020    Encounter for supervision pregnancy in primigravida, antepartum 2020    Gastroenteritis 2020    Acute midline thoracic back pain 2020    Migraine 2018    Recurrent UTI 01/10/2018    Arthralgia of multiple joints 2016    Fatigue 2016    Vitamin D deficiency 2016    Depression with anxiety 2012    Irritable bowel syndrome 2012     She  has a past surgical history that includes  section; Colposcopy; and pr  delivery only (N/A, 2018)    Her family history includes COPD in her maternal grandfather; Cancer in her maternal grandfather and paternal grandmother; Heart disease in her father, maternal grandfather, maternal grandmother, and paternal grandfather; Hyperlipidemia in her father and paternal grandfather; No Known Problems in her brother and mother  She  reports that she has never smoked  She has never used smokeless tobacco  She reports that she drinks about 2 0 standard drinks of alcohol per week  She reports that she does not use drugs  Current Outpatient Medications   Medication Sig Dispense Refill    Prenat w/o V-XK-Dhpjocz-FA-DHA (PNV-DHA PO) Take by mouth       No current facility-administered medications for this visit  Current Outpatient Medications on File Prior to Visit   Medication Sig    Prenat w/o Z-EZ-Ltkxead-FA-DHA (PNV-DHA PO) Take by mouth    [DISCONTINUED] cyclobenzaprine (FLEXERIL) 5 mg tablet Take 1 tablet (5 mg total) by mouth 3 (three) times a day as needed for muscle spasms (Patient not taking: Reported on 7/20/2020)    [DISCONTINUED] ondansetron (ZOFRAN) 4 mg tablet Take 1 tablet (4 mg total) by mouth every 8 (eight) hours as needed for nausea or vomiting (Patient not taking: Reported on 7/20/2020)     No current facility-administered medications on file prior to visit  She has No Known Allergies       Review of Systems   Constitutional: Negative for activity change, appetite change, chills, fatigue and fever  HENT: Negative for rhinorrhea, sneezing and sore throat  Eyes: Negative for visual disturbance  Respiratory: Negative for cough, shortness of breath and wheezing  Cardiovascular: Negative for chest pain, palpitations and leg swelling  Gastrointestinal: Negative for abdominal distention, constipation, diarrhea, nausea and vomiting  Genitourinary: Negative for difficulty urinating  Neurological: Negative for syncope and light-headedness           Objective:      /60 (BP Location: Left arm, Patient Position: Sitting, Cuff Size: Standard)   Temp 98 1 °F (36 7 °C)   Ht 5' 3" (1 6 m)   Wt 64 9 kg (143 lb 2 oz)   LMP 05/26/2020 (Exact Date)   Breastfeeding No   BMI 25 35 kg/m²          Physical Exam

## 2020-08-05 ENCOUNTER — TELEMEDICINE (OUTPATIENT)
Dept: OBGYN CLINIC | Facility: CLINIC | Age: 35
End: 2020-08-05

## 2020-08-05 DIAGNOSIS — Z34.81 ENCOUNTER FOR SUPERVISION OF NORMAL PREGNANCY IN MULTIGRAVIDA IN FIRST TRIMESTER: Primary | ICD-10-CM

## 2020-08-05 PROCEDURE — OBC: Performed by: STUDENT IN AN ORGANIZED HEALTH CARE EDUCATION/TRAINING PROGRAM

## 2020-08-05 NOTE — PROGRESS NOTES
OB INTAKE INTERVIEW  * Pt presents for OB intake YES This was an OB Intake consultation   * Accompanied by: Patient was the only person on the consultation call   *  *Hx of  delivery prior to 36 weeks 6 days NO  *Last Menstrual Period: Pt's LMP was 2020  *Ultrasound date:2020   8 weeks 0 days  *Estimated date of delivery: 2021   * confirmed by LMP    *Signs/Symptoms of Pregnancy   *Nausea, Fatigue   *constipation NO   *headaches NO   *cramping/spotting NO   *PICA cravings NO  *Diabetes- if you answer yes, please order 1 hour GTT, 50g   *hx of GDM NO   *BMI >35 NO   *first degree relative with type 2 diabetes NO   *hx of PCOS NO   *current metformin use NO   *prior hx of LGA/macrosomia NO   *AMA with other risk factors YES Patient is 38years old she will be 39years old at time of delivery   *Hypertension- if you answer yes, please order preeclampsia labs including 24 hour urine protein   *Hx of chronic HTN NO   *hx of gestational HTN NO     *hx of preeclampsia, eclampsia, or HELLP syndrome NO  *Infection Screening-    *does the pt have a hx of MRSA? NO   *if yes- please follow MRSA protocol and obtain a nasal swab for MRSA culture   *history of herpes? NO   *ok for blood transfusion YES  *Immunizations:   *influenza vaccine given today NO   *discussed Tdap vaccine YES Information was mailed out with her OB intake folder on 2020  *Interview education   *[facility] P[facility] Ess[facility] Book reviewed and discussed YES   *Handouts given:YES    *Baby and Me phone rigoberto guide NO    *Baby and Me support center    *[facility]     *discussed genetic testing- pt interested NO     *appointment at [place] [id number] made YES just for her level 2 ultra sound     *Prenatal lab work scripts YES plus 1 early glucose test     *Nurse/Family Partnership- pt may qualify NO referral placed NO   *4 P's- substance abuse screening    Presently using? NO    Past use? NO    Partner using?  NO    Parents/Family using? NO    *I have these concerns about this prenatal patient: Advance maternal Age at 38years old she will be 39years old at time of delivery   *Details that I feel the provider should be aware of: Patient has two (2) prior C-Sections   Patient will like to discuss with provider that she will like to try to  deliver vaginal with this pregnancy she will like to be given the opportunity to try  PN1 visit scheduled  The patient was oriented to our practice and all questions were answered  YES    Interviewed by: Tan Avilez MA     Patient reports that she is doing very well with the pregnancy she is still having mild nausea but feels much better   Patient will like to breast feed the baby she was informed that she will deliver at the [facility]   Patient reports that she is staying home safe with her two sons

## 2020-08-19 LAB
EXTERNAL HEPATITIS B SURFACE ANTIGEN: NORMAL
EXTERNAL HIV-1/2 AB-AG: NORMAL
EXTERNAL RUBELLA IGG QUANTITATION: NORMAL
EXTERNAL SYPHILIS RPR SCREEN: NORMAL

## 2020-08-24 ENCOUNTER — INITIAL PRENATAL (OUTPATIENT)
Dept: OBGYN CLINIC | Age: 35
End: 2020-08-24

## 2020-08-24 VITALS — DIASTOLIC BLOOD PRESSURE: 62 MMHG | WEIGHT: 142 LBS | SYSTOLIC BLOOD PRESSURE: 110 MMHG | BODY MASS INDEX: 25.15 KG/M2

## 2020-08-24 DIAGNOSIS — O34.219 PREGNANCY WITH HISTORY OF CESAREAN SECTION, ANTEPARTUM: ICD-10-CM

## 2020-08-24 DIAGNOSIS — Z98.891 HISTORY OF C-SECTION: ICD-10-CM

## 2020-08-24 DIAGNOSIS — O09.521 AMA (ADVANCED MATERNAL AGE) MULTIGRAVIDA 35+, FIRST TRIMESTER: Primary | ICD-10-CM

## 2020-08-24 DIAGNOSIS — Z11.3 SCREENING FOR STDS (SEXUALLY TRANSMITTED DISEASES): ICD-10-CM

## 2020-08-24 LAB
SL AMB  POCT GLUCOSE, UA: NORMAL
SL AMB POCT URINE PROTEIN: NORMAL

## 2020-08-24 PROCEDURE — 87491 CHLMYD TRACH DNA AMP PROBE: CPT | Performed by: NURSE PRACTITIONER

## 2020-08-24 PROCEDURE — 87591 N.GONORRHOEAE DNA AMP PROB: CPT | Performed by: NURSE PRACTITIONER

## 2020-08-24 PROCEDURE — PNV: Performed by: NURSE PRACTITIONER

## 2020-08-24 NOTE — ASSESSMENT & PLAN NOTE
Previous  x 2  Is considering TOLAC  Will both deliveries, she dilated to 10cm and pushed for 2-3 hours, then was taken for   Her first son weighed 7lb 13oz and second son weighed 8lb 3oz  Discussed risks with TOLAC after , including <1% risk of uterine rupture  Also discussed possibility given her hx that she would require another   Recommended growth scan in the 3rd trimester

## 2020-08-24 NOTE — PROGRESS NOTES
Problem List Items Addressed This Visit        Other    Pregnancy with history of  section, antepartum     Previous  x 2  Is considering TOLAC  Will both deliveries, she dilated to 10cm and pushed for 2-3 hours, then was taken for   Her first son weighed 7lb 13oz and second son weighed 8lb 3oz  Discussed risks with TOLAC after , including <1% risk of uterine rupture  Also discussed possibility given her hx that she would require another   Recommended growth scan in the 3rd trimester  History of     Relevant Orders    Ambulatory Referral to Maternal Fetal Medicine    AMA (advanced maternal age) multigravida 33+, first trimester - Primary     Patient presents for her PN 1, with her  at 13w0d  Planned pregnancy  [id number]  # 1 - Date: 12/10/14, Sex: Male, Weight: 3544 g (7 lb 13 oz), GA: 39w3d, Delivery: , Low Transverse, Apgar1: None, Apgar5: None, Living: Living, Birth Comments: None    # 2 - Date: 17, Sex: None, Weight: None, GA: None, Delivery: Spontaneous , Apgar1: None, Apgar5: None, Living: None, Birth Comments: None    # 3 - Date: 18, Sex: Male, Weight: 3714 g (8 lb 3 oz), GA: 40w3d, Delivery: , Low Transverse, Apgar1: 9, Apgar5: 9, Living: Living, Birth Comments: None    # 4 - Date: None, Sex: None, Weight: None, GA: None, Delivery: None, Apgar1: None, Apgar5: None, Living: None, Birth Comments: None    Past Medical History:   Diagnosis Date    Abnormal Pap smear of cervix     , spontaneous complete 2017    Transitioned From: Spontaneous , incomplete    Depression     resolved    HPV (human papilloma virus) infection     Migraine     occas  takes med  prn    UTI (urinary tract infection) 2016     Past Surgical History:   Procedure Laterality Date     SECTION       CPD?     COLPOSCOPY      2016    [place]  DELIVERY ONLY N/A 2018    Procedure:  SECTION () REPEAT;  Surgeon: Pavel Dyer MD;  [facility];  Service: Obstetrics    WISDOM TOOTH EXTRACTION Bilateral        Denies OB complaints  Denies pelvic pain, bleeding, LOF  Exam WNL   by Doppler  Patient has completed prenatal labs, although this was done at [facility] and results are not available today  Will call [facility] to have results faxed to our office for review  Pap up to date  Last pap 18 neg/neg  Gc/chl sent today  Patient is not scheduled for sequential screening at [facility]  Discussed this along with [id number] testing  [facility] referral placed and will call today  She is scheduled for 20 week ultrasound with [facility]  Continue PNV  Reviewed diet and activity recommendations, practice set up, visit intervals and reasons to call  RTO in 4 weeks              Relevant Orders    Ambulatory Referral to Maternal Fetal Medicine    POCT urine dip (Completed)      Other Visit Diagnoses     Screening for STDs (sexually transmitted diseases)        Relevant Orders    Chlamydia/GC amplified DNA by PCR

## 2020-08-25 ENCOUNTER — DOCUMENTATION (OUTPATIENT)
Dept: PERINATAL CARE | Facility: CLINIC | Age: 35
End: 2020-08-25

## 2020-08-25 ENCOUNTER — TELEPHONE (OUTPATIENT)
Dept: PERINATAL CARE | Facility: CLINIC | Age: 35
End: 2020-08-25

## 2020-08-25 LAB
C TRACH DNA SPEC QL NAA+PROBE: NEGATIVE
N GONORRHOEA DNA SPEC QL NAA+PROBE: NEGATIVE

## 2020-08-25 NOTE — TELEPHONE ENCOUNTER
Spoke with patient and confirmed appointment with MFM  1 support person ( must be over age of 15) may accompany patient  Will you and your support person be able to wear a mask ,without a valve , during entire appointment? YES   To minimize your exposure in our waiting area,check in and rooming questions will be done via phone  When you arrive in the parking lot please call the following inside line # prior to entering office:    [facility] [phone] line: [facility] line:  [phone] line: [phone]  Sandra Parker line:  [phone]  [place] line: [phone]    Have you or your support person traveled outside the state in the last 2 weeks? NO   If yes, what state did you travel to? NO     Do you or your support person have:  Fever or flu- like symptoms? NO  Symptoms of upper respiratory infection like runny nose, sore throat or cough? NO  Do you have new headache that you have not had in the past?NO  Have you experienced any new shortness of breath recently? NO  Do you have any new loss of taste or smell? NO  Do you have any new diarrhea, nausea or vomiting? NO  Have you recently been in contact with anyone who has been sick or diagnosed with COVID-19 infection? NO  Have you been recommended to quarantine because of an exposure to a confirmed positive COVID19 person? NO  You and your support person will have temperature screening upon arrival   Patient verbalized understanding of all instructions   -------------------------------------------------------------        IF you are not feeling well- cough, fever, shortness of breath or any flu like symptoms, contact your primary care physician or -[facility] Cecilia Concepcion    Any questions with these instructions please call Maternal Fetal Medicine nurse line today @ # [phone]

## 2020-08-25 NOTE — PROGRESS NOTES
Attempting to make lab form for pts appointment for tomorrow    Unable to read insurance card- spoke with Ilene Trimble at [address] [place] [address] Rd stated lab is Germania Figueroa # for call is [phone]

## 2020-08-26 ENCOUNTER — TELEPHONE (OUTPATIENT)
Dept: OBGYN CLINIC | Facility: CLINIC | Age: 35
End: 2020-08-26

## 2020-08-26 ENCOUNTER — ROUTINE PRENATAL (OUTPATIENT)
Dept: PERINATAL CARE | Facility: CLINIC | Age: 35
End: 2020-08-26
Payer: COMMERCIAL

## 2020-08-26 VITALS
BODY MASS INDEX: 25.66 KG/M2 | DIASTOLIC BLOOD PRESSURE: 64 MMHG | WEIGHT: 144.8 LBS | SYSTOLIC BLOOD PRESSURE: 104 MMHG | HEIGHT: 63 IN | HEART RATE: 80 BPM | TEMPERATURE: 99.3 F

## 2020-08-26 DIAGNOSIS — O09.521 AMA (ADVANCED MATERNAL AGE) MULTIGRAVIDA 35+, FIRST TRIMESTER: Primary | ICD-10-CM

## 2020-08-26 DIAGNOSIS — Z36.82 ENCOUNTER FOR NUCHAL TRANSLUCENCY TESTING: ICD-10-CM

## 2020-08-26 DIAGNOSIS — Z3A.13 13 WEEKS GESTATION OF PREGNANCY: ICD-10-CM

## 2020-08-26 DIAGNOSIS — O34.219 PREGNANCY WITH HISTORY OF CESAREAN SECTION, ANTEPARTUM: ICD-10-CM

## 2020-08-26 DIAGNOSIS — Z98.891 HISTORY OF C-SECTION: ICD-10-CM

## 2020-08-26 PROCEDURE — 99213 OFFICE O/P EST LOW 20 MIN: CPT | Performed by: OBSTETRICS & GYNECOLOGY

## 2020-08-26 PROCEDURE — 76801 OB US < 14 WKS SINGLE FETUS: CPT | Performed by: OBSTETRICS & GYNECOLOGY

## 2020-08-26 PROCEDURE — 76813 OB US NUCHAL MEAS 1 GEST: CPT | Performed by: OBSTETRICS & GYNECOLOGY

## 2020-08-26 NOTE — PROGRESS NOTES
The patient was seen today for an ultrasound  Please see ultrasound report (located under Ob Procedures) for additional details  Thank you very much for allowing us to participate in the care of this very nice patient  Should you have any questions, please do not hesitate to contact me  MD Juan Carlos [place]ah  Attending Physician, [place]

## 2020-08-26 NOTE — TELEPHONE ENCOUNTER
Pt 13 weeks with "excruciating" migraine HA for past 2 days  Used to take maxalt - but late now to take it  excedrin migraine used to help  To ER? Stu Fraga  [birth year]  Per KTM, tylenol, caffeine, and benadryl  If no relief, pt will go to ED   Pt said maxalt no help after 2 days,

## 2020-09-08 LAB
# FETUSES US: 1
CFDNA.FET/TOTAL PLAS.CFDNA: NORMAL
FET CHR 13 TS PLAS.CFDNA QL: NEGATIVE
FET CHR 18 TS PLAS.CFDNA QL: NEGATIVE
FET CHR 21 TS PLAS.CFDNA QL: NEGATIVE
FET CHR X + Y ANEUP PLAS.CFDNA QL: NORMAL
FET CHROM X + Y ANEUP CFDNA IMP: NORMAL
FET Y CHROM PLAS.CFDNA QL: NOT DETECTED
FET Y CHROM PLAS.CFDNA: NORMAL
GA (DAYS): 3 D
GA (WEEKS): 14 WK
MICRODELETION SYND BLD/T FISH: NORMAL
REF LAB TEST METHOD: NORMAL
SERVICE CMNT-IMP: NORMAL
SERVICE CMNT-IMP: NORMAL
SL AMB ABNORMAL MSS?: NORMAL
SL AMB ABNORMAL US?: NORMAL
SL AMB ADVANCED MATERNAL AGE?: NORMAL
SL AMB MICRODELETION INTERP: NORMAL
SL AMB MICRODELETION: NOT DETECTED
SL AMB PERSONAL/FAM HISTORY?: NORMAL
SL AMB SPECIFICATIONS: NORMAL

## 2020-09-09 ENCOUNTER — TELEPHONE (OUTPATIENT)
Dept: PERINATAL CARE | Facility: CLINIC | Age: 35
End: 2020-09-09

## 2020-09-09 NOTE — TELEPHONE ENCOUNTER
----- Message from Irina Guevara MD sent at 9/9/2020 11:32 AM EDT -----  I have reviewed the results which are low risk  Please call patient and notify her of reassuring results if she has not viewed on [facility]  Thank you

## 2020-09-09 NOTE — LETTER
09/09/20  Kobe Fraga  1985    Thank you for completing the cell-free DNA screen ("non-invasive prenatal screening" or "Qnatal")  To obtain comprehensive screening results, please complete blood work for MSAFP (maternal-serum alpha fetoprotein) between the weeks of 9/12/2020 to 10/11/2020  Based on your insurance coverage, please use one of the following locations  The other option is to go to www [email or web address]  Call our office for any questions at [phone]          Sincerely,    Sandoval Agudelo RN

## 2020-09-09 NOTE — TELEPHONE ENCOUNTER
Pt called our office stating she received a My Chart message from DR An Mary  She states she saw the results of the Qnatal, however she is really interested in the gender and wants to come to our office to  envelope in our [place] office today  Pt informed that we do not have a [address] office  Pt informed of all our locations and days they are open this week  Pt states she will be in our [facility] office first thing tomorrow  Pt declines questions with test results and receptive to information provided  MSAFP explained, pt receptive and declines questions at this time  TRF mailed

## 2020-09-09 NOTE — RESULT ENCOUNTER NOTE
I have reviewed the results which are low risk  Please call patient and notify her of reassuring results if she has not viewed on [employer]  Thank you

## 2020-09-22 ENCOUNTER — HOSPITAL ENCOUNTER (EMERGENCY)
Facility: HOSPITAL | Age: 35
Discharge: HOME | End: 2020-09-23
Attending: EMERGENCY MEDICINE | Admitting: EMERGENCY MEDICINE
Payer: COMMERCIAL

## 2020-09-22 ENCOUNTER — TELEPHONE (OUTPATIENT)
Dept: OBGYN CLINIC | Facility: CLINIC | Age: 35
End: 2020-09-22

## 2020-09-22 VITALS
TEMPERATURE: 98.2 F | RESPIRATION RATE: 18 BRPM | SYSTOLIC BLOOD PRESSURE: 97 MMHG | OXYGEN SATURATION: 99 % | WEIGHT: 143.74 LBS | BODY MASS INDEX: 25.46 KG/M2 | HEART RATE: 72 BPM | DIASTOLIC BLOOD PRESSURE: 59 MMHG

## 2020-09-22 DIAGNOSIS — R11.2 NAUSEA AND VOMITING: ICD-10-CM

## 2020-09-22 DIAGNOSIS — G43.009 MIGRAINE WITHOUT AURA AND WITHOUT STATUS MIGRAINOSUS, NOT INTRACTABLE: Primary | ICD-10-CM

## 2020-09-22 DIAGNOSIS — G43.909 MIGRAINE HEADACHE: Primary | ICD-10-CM

## 2020-09-22 PROCEDURE — 99283 EMERGENCY DEPT VISIT LOW MDM: CPT

## 2020-09-22 PROCEDURE — 96365 THER/PROPH/DIAG IV INF INIT: CPT

## 2020-09-22 PROCEDURE — 96375 TX/PRO/DX INJ NEW DRUG ADDON: CPT

## 2020-09-22 PROCEDURE — 99284 EMERGENCY DEPT VISIT MOD MDM: CPT | Performed by: EMERGENCY MEDICINE

## 2020-09-22 RX ORDER — ONDANSETRON 4 MG/1
4 TABLET, ORALLY DISINTEGRATING ORAL EVERY 6 HOURS PRN
Qty: 20 TABLET | Refills: 0 | Status: ON HOLD | OUTPATIENT
Start: 2020-09-22 | End: 2021-02-23 | Stop reason: ALTCHOICE

## 2020-09-22 RX ORDER — BUTALBITAL, ACETAMINOPHEN AND CAFFEINE 50; 325; 40 MG/1; MG/1; MG/1
1-2 TABLET ORAL 2 TIMES DAILY PRN
Qty: 10 TABLET | Refills: 0 | Status: SHIPPED | OUTPATIENT
Start: 2020-09-22 | End: 2020-10-30

## 2020-09-22 RX ORDER — METOCLOPRAMIDE HYDROCHLORIDE 5 MG/ML
10 INJECTION INTRAMUSCULAR; INTRAVENOUS ONCE
Status: COMPLETED | OUTPATIENT
Start: 2020-09-22 | End: 2020-09-22

## 2020-09-22 RX ORDER — MAGNESIUM SULFATE HEPTAHYDRATE 40 MG/ML
2 INJECTION, SOLUTION INTRAVENOUS ONCE
Status: COMPLETED | OUTPATIENT
Start: 2020-09-22 | End: 2020-09-22

## 2020-09-22 RX ORDER — DIPHENHYDRAMINE HYDROCHLORIDE 50 MG/ML
25 INJECTION INTRAMUSCULAR; INTRAVENOUS ONCE
Status: COMPLETED | OUTPATIENT
Start: 2020-09-22 | End: 2020-09-22

## 2020-09-22 RX ADMIN — METOCLOPRAMIDE HYDROCHLORIDE 10 MG: 5 INJECTION INTRAMUSCULAR; INTRAVENOUS at 21:51

## 2020-09-22 RX ADMIN — SODIUM CHLORIDE 1000 ML: 0.9 INJECTION, SOLUTION INTRAVENOUS at 21:54

## 2020-09-22 RX ADMIN — MAGNESIUM SULFATE IN WATER 2 G: 40 INJECTION, SOLUTION INTRAVENOUS at 21:54

## 2020-09-22 RX ADMIN — DIPHENHYDRAMINE HYDROCHLORIDE 25 MG: 50 INJECTION, SOLUTION INTRAMUSCULAR; INTRAVENOUS at 21:51

## 2020-09-22 NOTE — TELEPHONE ENCOUNTER
In coming call from patient  17 weeks gestation  Has been taking Tylenol/Benadryl and caffeine for Migraines  Has been taking around the clock with no relief for 2 days  Patient does have a history of Migraines for which she would either take Excedrin Migraine or Maxalt  Will reach out to on call provider to advise

## 2020-09-23 NOTE — TELEPHONE ENCOUNTER
Yes, okay to try fioricet single dose to see if will resolve completely  Needs to keep hydrating well at home

## 2020-09-23 NOTE — ED PROVIDER NOTES
History  Chief Complaint   Patient presents with    Migraine     Pt states she has had a migraine since friday night  Pt states she is unable to take her regular migraine medication due to being pregnant  Pt states the migraine is also causing N/V      HPI    Prior to Admission Medications   Prescriptions Last Dose Informant Patient Reported? Taking? Prenat w/o P-NR-Qmextiy-FA-DHA (PNV-DHA PO)  Self Yes Yes   Sig: Take by mouth   butalbital-acetaminophen-caffeine (FIORICET,ESGIC) -40 mg per tablet   No Yes   Sig: Take 1-2 tablets by mouth 2 (two) times a day as needed for headaches      Facility-Administered Medications: None       Past Medical History:   Diagnosis Date    Abnormal Pap smear of cervix     , spontaneous complete 2017    Transitioned From: Spontaneous , incomplete    Depression     resolved    HPV (human papilloma virus) infection     Migraine     occas  takes med  prn    UTI (urinary tract infection) 2016       Past Surgical History:   Procedure Laterality Date     SECTION       CPD?     COLPOSCOPY      2016    [place]  DELIVERY ONLY N/A 2018    Procedure:  SECTION () REPEAT;  Surgeon: Rod Mata MD;  Location: [facility] ;  Service: Obstetrics    WISDOM TOOTH EXTRACTION Bilateral        Family History   Problem Relation Age of Onset    Hyperlipidemia Father     Heart disease Father         defect    Heart disease Maternal Grandmother         defect    Cancer Maternal Grandfather         lung    Heart disease Maternal Grandfather         defect    COPD Maternal Grandfather         emphysema    Cancer Paternal Grandmother         lung    Hyperlipidemia Paternal Grandfather     Heart disease Paternal Grandfather         defect    No Known Problems Mother     No Known Problems Brother     No Known Problems Son     No Known Problems Son      I have reviewed and agree with the history as documented  E-Cigarette/Vaping    E-Cigarette Use Never User      E-Cigarette/Vaping Substances     Social History     Tobacco Use    Smoking status: Never Smoker    Smokeless tobacco: Never Used   Substance Use Topics    Alcohol use: Not Currently     Alcohol/week: 2 0 standard drinks     Types: 2 Cans of beer per week    Drug use: No       Review of Systems    Physical Exam  Physical Exam  Vitals signs and nursing note reviewed  Constitutional:       General: She is not in acute distress  Appearance: She is well-developed  HENT:      Head: Normocephalic and atraumatic  Eyes:      Extraocular Movements: Extraocular movements intact  Conjunctiva/sclera: Conjunctivae normal       Pupils: Pupils are equal, round, and reactive to light  Neck:      Musculoskeletal: Normal range of motion  Trachea: No tracheal deviation  Cardiovascular:      Rate and Rhythm: Normal rate and regular rhythm  Heart sounds: Normal heart sounds  Pulmonary:      Effort: Pulmonary effort is normal  No respiratory distress  Breath sounds: Normal breath sounds  Abdominal:      General: There is no distension  Palpations: Abdomen is soft  Tenderness: There is no abdominal tenderness  Comments: Gravid uterus, below umbilicus   Skin:     General: Skin is warm and dry  Neurological:      Mental Status: She is alert and oriented to person, place, and time  GCS: GCS eye subscore is 4  GCS verbal subscore is 5  GCS motor subscore is 6  Cranial Nerves: No cranial nerve deficit  Sensory: Sensation is intact  No sensory deficit  Motor: Motor function is intact  Coordination: Finger-Nose-Finger Test and Heel to [place] Rase Test normal       Deep Tendon Reflexes:      Reflex Scores:       Bicep reflexes are 1+ on the right side and 1+ on the left side  Brachioradialis reflexes are 1+ on the right side and 1+ on the left side         Patellar reflexes are 1+ on the right side and 1+ on the left side  Psychiatric:         Behavior: Behavior normal          Vital Signs  ED Triage Vitals   Temperature Pulse Respirations Blood Pressure SpO2   09/22/20 1947 09/22/20 1947 09/22/20 1947 09/22/20 1947 09/22/20 1947   98 2 °F (36 8 °C) 77 18 104/65 96 %      Temp Source Heart Rate Source Patient Position - Orthostatic VS BP Location FiO2 (%)   09/22/20 1947 09/22/20 1947 09/22/20 2156 09/22/20 1947 --   Oral Monitor Lying Left arm       Pain Score       --                  Vitals:    09/22/20 1947 09/22/20 2156 09/22/20 2300   BP: 104/65 105/64 97/59   Pulse: 77 80 72   Patient Position - Orthostatic VS:  Lying Lying         Visual Acuity      ED Medications  Medications   sodium chloride 0 9 % bolus 1,000 mL (0 mL Intravenous Stopped 9/22/20 2257)   metoclopramide (REGLAN) injection 10 mg (10 mg Intravenous Given 9/22/20 2151)   diphenhydrAMINE (BENADRYL) injection 25 mg (25 mg Intravenous Given 9/22/20 2151)   magnesium sulfate 2 g/50 mL IVPB (premix) 2 g (0 g Intravenous Stopped 9/22/20 2257)       Diagnostic Studies  Results Reviewed     None                 No orders to display              Procedures  Procedures         ED Course                                       MDM  Number of Diagnoses or Management Options  Migraine headache: new and does not require workup  Nausea and vomiting: new and does not require workup  Diagnosis management comments: This is a 42-year-old female who presents here today for evaluation of migraine headache  She has a history of migraines at baseline usually takes Maxalt or Excedrin migraine  However, she is approximately 17 weeks pregnant so is unable to take this  She has been taking Tylenol, caffeine and Benadryl to help with her headaches, and so far has been helping during her pregnancy  However, on 09/18 she began having a migraine and with medications will get about an hour worth of relief before it returns    She describes it as diffuse throbbing throughout her head and now feels like everything is stiff  She has baseline nausea with migraines and occasionally vomits, but has had more vomiting and diarrhea than normal   She endorses typical photophobia, phonophobia, and sensitivity to smells  She denies any vision changes, focal weakness or numbness, gait instability, head injuries  She denies fevers or URI symptoms  She has not yet started feeling the baby move  She denies any abdominal pain or cramping, vaginal bleeding, discharge, spotting  She denies any complications with the pregnancy thus far  She was prescribed Fioricet today by her doctor and did take it this afternoon  She states she felt better for 2 hours and was able to sleep, but it then recurred prompting her to come to the ER for evaluation  She has had migraines with prior pregnancies, but states not to this extent  Review of systems:  Otherwise negative unless stated as above    She is well-appearing, in no acute distress  She has gravid uterus  Exam is otherwise unremarkable, without focal neurologic deficits  This is most likely migraine incompletely treated secondary to inability to take her normal medications because of being pregnant  I am not concerned about sinus venous thrombosis, meningitis/encephalitis, preeclampsia  We will treat her headache while here  The patient feels much better after medications and states symptoms have resolved  I discussed with her continued treatment of her headache at home, follow-up with her OB for further management of headaches while pregnant, and indications for return, and she expresses understanding with this plan        Disposition  Final diagnoses:   Migraine headache   Nausea and vomiting     Time reflects when diagnosis was documented in both MDM as applicable and the Disposition within this note     Time User Action Codes Description Comment    9/22/2020 11:12 PM Mahoney-Tesoriero, Jolayne Bernheim [P11 376] Migraine headache     9/22/2020 11:18 PM Noelle Kendall Add [R11 2] Nausea and vomiting       ED Disposition     ED Disposition Condition Date/Time Comment    Discharge Good Tue Sep 22, 2020 11:12 PM Chelsea Fraga discharge to home/self care  Follow-up Information     Follow up With Specialties Details Why Contact Info Additional Information    [address] [facility] Ob[facility]s and Gynecology Call  As needed, to discuss further treatment of your migraines while pregnant [place] [id number] [place] [id number]  [phone] [address] [facility], [address], [id number] [phone]          Patient's Medications   Discharge Prescriptions    ONDANSETRON (ZOFRAN-ODT) 4 MG DISINTEGRATING TABLET    Take 1 tablet (4 mg total) by mouth every 6 (six) hours as needed for nausea or vomiting       Start Date: 9/22/2020 End Date: --       Order Dose: 4 mg       Quantity: 20 tablet    Refills: 0     No discharge procedures on file      PDMP Review     None          ED Provider  Electronically Signed by           Edison Fisher MD  09/22/20 5507

## 2020-09-23 NOTE — DISCHARGE INSTRUCTIONS
Try taking the the magnesium to help prevent migraine headaches  Take the Zofran as needed to help with nausea associated with future migraines, as well as the Fioricet that you were prescribed  Follow-up with your OB for further discussion of treatment options for your migraines  Return if you develop persistent vomiting and unable to tolerate fluids despite the nausea medications, or for any other concerns

## 2020-09-23 NOTE — TELEPHONE ENCOUNTER
Pt has had a migraine since last Fri - 5 days  She called yesterday ( got fioricet) but pain so bad - went to ED at 9 pm  Pt 17 weeks  Had been vomiting - was dehydrated  Was given iv fluids, magnesium, zofran in ED  [place] [facility] Pt was much better - but HA not gone entirely   She has an appt tomorrow  Is now taking magnesium 400 and tylenol  Is dull pain acceptable now? She is very afraid of repeat migraine   Thank you

## 2020-09-24 ENCOUNTER — TELEPHONE (OUTPATIENT)
Dept: NEUROLOGY | Facility: CLINIC | Age: 35
End: 2020-09-24

## 2020-09-24 ENCOUNTER — ROUTINE PRENATAL (OUTPATIENT)
Dept: OBGYN CLINIC | Age: 35
End: 2020-09-24

## 2020-09-24 VITALS
WEIGHT: 143 LBS | SYSTOLIC BLOOD PRESSURE: 98 MMHG | TEMPERATURE: 98.2 F | DIASTOLIC BLOOD PRESSURE: 54 MMHG | BODY MASS INDEX: 25.33 KG/M2

## 2020-09-24 DIAGNOSIS — R35.0 URINARY FREQUENCY: ICD-10-CM

## 2020-09-24 DIAGNOSIS — O09.521 AMA (ADVANCED MATERNAL AGE) MULTIGRAVIDA 35+, FIRST TRIMESTER: ICD-10-CM

## 2020-09-24 DIAGNOSIS — R11.0 NAUSEA: ICD-10-CM

## 2020-09-24 DIAGNOSIS — G43.911 INTRACTABLE MIGRAINE WITH STATUS MIGRAINOSUS, UNSPECIFIED MIGRAINE TYPE: ICD-10-CM

## 2020-09-24 DIAGNOSIS — Z13.79 GENETIC SCREENING: ICD-10-CM

## 2020-09-24 DIAGNOSIS — Z34.82 PRENATAL CARE, SUBSEQUENT PREGNANCY, SECOND TRIMESTER: Primary | ICD-10-CM

## 2020-09-24 DIAGNOSIS — Z98.891 HISTORY OF C-SECTION: ICD-10-CM

## 2020-09-24 LAB
SL AMB  POCT GLUCOSE, UA: NEGATIVE
SL AMB POCT URINE PROTEIN: NEGATIVE

## 2020-09-24 PROCEDURE — PNV: Performed by: NURSE PRACTITIONER

## 2020-09-24 PROCEDURE — 87147 CULTURE TYPE IMMUNOLOGIC: CPT | Performed by: NURSE PRACTITIONER

## 2020-09-24 PROCEDURE — 87186 SC STD MICRODIL/AGAR DIL: CPT | Performed by: NURSE PRACTITIONER

## 2020-09-24 PROCEDURE — 87086 URINE CULTURE/COLONY COUNT: CPT | Performed by: NURSE PRACTITIONER

## 2020-09-24 RX ORDER — CYPROHEPTADINE HYDROCHLORIDE 4 MG/1
TABLET ORAL
Qty: 30 TABLET | Refills: 0 | Status: SHIPPED | OUTPATIENT
Start: 2020-09-24 | End: 2020-10-30

## 2020-09-24 RX ORDER — BUTALBITAL, ACETAMINOPHEN AND CAFFEINE 50; 325; 40 MG/1; MG/1; MG/1
CAPSULE ORAL
COMMUNITY
Start: 2020-09-22 | End: 2020-12-21

## 2020-09-24 NOTE — PROGRESS NOTES
Patient is scheduled for level II ultrasound on 10/13  Patient reported having a painful migraine since last Friday; migraine medication is providing little to no relief  Patient reported being in the ER on Tuesday for same; minimal appetite as well  Patient denies VB, LOF, and contractions  Urine culture repeat + collection today

## 2020-09-24 NOTE — PATIENT INSTRUCTIONS
Pregnancy at 15 to 18 Weeks   AMBULATORY CARE:   What changes are happening to your body:  Now that you are in your second trimester, you have more energy  You may also feel hungrier than usual  You may start to experience other symptoms, such as heartburn or dizziness  You may be gaining about ½ to 1 pound a week, and your pregnancy is beginning to show  You may need to start wearing maternity clothes  Seek care immediately if:   · You have pain or cramping in your abdomen or low back  · You have heavy vaginal bleeding or clotting  · You pass material that looks like tissue or large clots  Collect the material and bring it with you  Contact your healthcare provider if:   · You cannot keep food or drinks down, and you are losing weight  · You have light bleeding  · You have chills or a fever  · You have vaginal itching, burning, or pain  · You have yellow, green, white, or foul-smelling vaginal discharge  · You have pain or burning when you urinate, less urine than usual, or pink or bloody urine  · You have questions or concerns about your condition or care  How to care for yourself at this stage of your pregnancy:   · Manage heartburn  by eating 4 or 5 small meals each day instead of large meals  Avoid spicy foods  Avoid eating right before bedtime  · Manage nausea and vomiting  Avoid fatty and spicy foods  Eat small meals throughout the day instead of large meals  [place] may help to decrease nausea  Ask your healthcare provider about other ways of decreasing nausea and vomiting  · Eat a variety of healthy foods  Healthy foods include fruits, vegetables, whole-grain breads, low-fat dairy foods, beans, lean meats, and fish  Drink liquids as directed  Ask how much liquid to drink each day and which liquids are best for you  Limit caffeine to less than 200 milligrams each day  Limit your intake of fish to 2 servings each week   Choose fish low in mercury such as canned light tuna, shrimp, salmon, cod, or tilapia  Do not  eat fish high in mercury such as swordfish, tilefish, chante mackerel, and shark  · Take prenatal vitamins as directed  Your need for certain vitamins and minerals, such as folic acid, increases during pregnancy  Prenatal vitamins provide some of the extra vitamins and minerals you need  Prenatal vitamins may also help to decrease the risk of certain birth defects  · Do not smoke  If you smoke, it is never too late to quit  Smoking increases your risk of a miscarriage and other health problems during your pregnancy  Smoking can cause your baby to be born too early or weigh less at birth  Ask your healthcare provider for information if you need help quitting  · Do not drink alcohol  Alcohol passes from your body to your baby through the placenta  It can affect your baby's brain development and cause fetal alcohol syndrome (FAS)  FAS is a group of conditions that causes mental, behavior, and growth problems  · Talk to your healthcare provider before you take any medicines  Many medicines may harm your baby if you take them when you are pregnant  Do not take any medicines, vitamins, herbs, or supplements without first talking to your healthcare provider  Never use illegal or street drugs (such as marijuana or cocaine) while you are pregnant  Safety tips during pregnancy:   · Avoid hot tubs and saunas  Do not use a hot tub or sauna while you are pregnant, especially during your first trimester  Hot tubs and saunas may raise your baby's temperature and increase the risk of birth defects  · Avoid toxoplasmosis  This is an infection caused by eating raw meat or being around infected cat feces  It can cause birth defects, miscarriages, and other problems  Wash your hands after you touch raw meat  Make sure any meat is well-cooked before you eat it  Avoid raw eggs and unpasteurized milk   Use gloves or ask someone else to clean your cat's litter box while you are pregnant  Changes that are happening with your baby:  By 18 weeks, your baby may be about 6 inches long from the top of the head to the rump (baby's bottom)  Your baby may weigh about 11 ounces  You may be able to feel your baby's movement at about 18 weeks or later  The first movements may not be that noticeable  They may feel like a fluttering sensation  Your baby also makes sucking movements and can hear certain sounds  What you need to know about prenatal care:  During the first 28 weeks of your pregnancy, you will see your healthcare provider once a month  Your healthcare provider will check your blood pressure and weight  You may also need any of the following:  · A urine test  may also be done to check for sugar and protein  These can be signs of gestational diabetes or infection  · A blood test  may be done to check for anemia (low iron level)  · Fundal height check  is a measurement of your uterus to check your baby's growth  This number is usually the same as the number of weeks that you have been pregnant  · An ultrasound  may be done to check your baby's development  Your healthcare provider may be able to tell you what your baby's gender is during the ultrasound  · Your baby's heart rate  will be checked  © 2017 [address] Information is for End User's use only and may not be sold, redistributed or otherwise used for commercial purposes  All illustrations and images included in CareNotes® are the copyrighted property of [employer]  or Dimitris Reyes  The above information is an  only  It is not intended as medical advice for individual conditions or treatments  Talk to your doctor, nurse or pharmacist before following any medical regimen to see if it is safe and effective for you

## 2020-09-24 NOTE — PROGRESS NOTES
Problem List Items Addressed This Visit        Cardiovascular and Mediastinum    Migraine    Relevant Medications    Butalbital-APAP-Caffeine -40 MG per capsule    cyproheptadine (PERIACTIN) 4 mg tablet    Other Relevant Orders    Ambulatory referral to Neurology       Other    History of     AMA (advanced maternal age) multigravida 33+, first trimester      Other Visit Diagnoses     Prenatal care, subsequent pregnancy, second trimester    -  Primary    Relevant Orders    POCT urine dip (Completed)    Urine culture    Urinary frequency        Relevant Orders    Urine culture    Nausea        Genetic screening        Relevant Orders    Alpha fetoprotein, maternal      Here with FOB  Examined in the dark d/t photosensitivity  Went to the ER on  for her migraines which have been showing minimal short term relief from fioricet, Magnesium, Zofran  She is willing to add riboflavin 400 mg daily and Periactin and at this point agrees to see Neurology  Denies LOF/CTX/VB  No other concerns  Discussed fetal awareness  Reprinted [facility] for her to do  L2 on 10/13  Urine cx repeat done today

## 2020-09-27 LAB — BACTERIA UR CULT: ABNORMAL

## 2020-09-28 ENCOUNTER — TELEPHONE (OUTPATIENT)
Dept: OBGYN CLINIC | Age: 35
End: 2020-09-28

## 2020-09-28 RX ORDER — NITROFURANTOIN 25; 75 MG/1; MG/1
100 CAPSULE ORAL 2 TIMES DAILY
Qty: 6 CAPSULE | Refills: 0 | Status: SHIPPED | OUTPATIENT
Start: 2020-09-28 | End: 2020-10-01

## 2020-09-28 NOTE — TELEPHONE ENCOUNTER
Should be ok but she can call her insurance co to see where else she can go if she doesn't want to wait

## 2020-09-28 NOTE — TELEPHONE ENCOUNTER
Patient called to let you know they scheduled her for next available for neurology appt but its not until 11-  Not sure if this is too far out bring she is 18 weeks pregnant  She still has migraine head still hurts  but able to function  Worried that this appt is not soon enough and not sure what to do, worried about baby  On wait list for sooner appt  Please advise   Best call back ending  [phone]

## 2020-09-29 ENCOUNTER — TELEPHONE (OUTPATIENT)
Dept: OBGYN CLINIC | Facility: CLINIC | Age: 35
End: 2020-09-29

## 2020-09-29 NOTE — TELEPHONE ENCOUNTER
----- Message from Gi Lafleur, [address] sent at 9/28/2020  9:18 AM EDT -----  Pls notify her urine cx showed an infection so I will send abx to her pharmacy  She may get a yeast infection from these antibiotics so she should use a monistat 7 treatment if this happens   x

## 2020-09-30 ENCOUNTER — TELEPHONE (OUTPATIENT)
Dept: OBGYN CLINIC | Age: 35
End: 2020-09-30

## 2020-09-30 NOTE — TELEPHONE ENCOUNTER
Pt not sure she is bleeding from vagina or rectum, cramping is associated with diarrhea, will call us back

## 2020-09-30 NOTE — TELEPHONE ENCOUNTER
Patient is 18 weeks pregnant calling to report some concerns,   Having cramping, resulting in diarrhea and this morning had blood in stool cant distinguish if coming from vaginal area or behind  Stool looked like tissue  Has UTI but has not been to pharmacy yet to  prescription that was sent      Please advise, best call back [phone]

## 2020-10-01 ENCOUNTER — TELEPHONE (OUTPATIENT)
Dept: OBGYN CLINIC | Age: 35
End: 2020-10-01

## 2020-10-01 NOTE — TELEPHONE ENCOUNTER
Pt g4, P2  Called with migraine 9/22   Had nausea and diarrhea also  Does get migraines    Was given 10 fioricet  On 9/24 , dxed with uti  Began macrobid   She has had diarrhea since Mon and now has bloody stools with mucousy type component    Bleeding is not from vagina  She also is presently having another aura and beginning another ha  Diarrhea for 5 days  - see pcp ?? Aleah Fraga  [birth year]  Per [facility], pt to call pcp  Will call neuro - see if she can get in with PA

## 2020-10-09 ENCOUNTER — TELEPHONE (OUTPATIENT)
Dept: OBGYN CLINIC | Facility: CLINIC | Age: 35
End: 2020-10-09

## 2020-10-12 ENCOUNTER — OFFICE VISIT (OUTPATIENT)
Dept: GASTROENTEROLOGY | Facility: CLINIC | Age: 35
End: 2020-10-12
Payer: COMMERCIAL

## 2020-10-12 ENCOUNTER — TELEPHONE (OUTPATIENT)
Dept: PERINATAL CARE | Facility: CLINIC | Age: 35
End: 2020-10-12

## 2020-10-12 ENCOUNTER — TELEPHONE (OUTPATIENT)
Dept: GASTROENTEROLOGY | Facility: CLINIC | Age: 35
End: 2020-10-12

## 2020-10-12 VITALS
HEIGHT: 64 IN | DIASTOLIC BLOOD PRESSURE: 58 MMHG | WEIGHT: 150.7 LBS | TEMPERATURE: 97.8 F | BODY MASS INDEX: 25.73 KG/M2 | HEART RATE: 86 BPM | SYSTOLIC BLOOD PRESSURE: 96 MMHG

## 2020-10-12 DIAGNOSIS — K62.5 RECTAL BLEEDING: ICD-10-CM

## 2020-10-12 DIAGNOSIS — R19.7 DIARRHEA, UNSPECIFIED TYPE: Primary | ICD-10-CM

## 2020-10-12 PROCEDURE — 99203 OFFICE O/P NEW LOW 30 MIN: CPT | Performed by: PHYSICIAN ASSISTANT

## 2020-10-12 RX ORDER — HYDROCORTISONE 25 MG/G
CREAM TOPICAL 2 TIMES DAILY
Qty: 30 G | Refills: 1 | Status: SHIPPED | OUTPATIENT
Start: 2020-10-12 | End: 2021-03-22

## 2020-10-12 RX ORDER — HYDROCORTISONE ACETATE 25 MG/1
25 SUPPOSITORY RECTAL
Qty: 14 SUPPOSITORY | Refills: 0 | Status: SHIPPED | OUTPATIENT
Start: 2020-10-12 | End: 2020-11-18 | Stop reason: ALTCHOICE

## 2020-10-13 ENCOUNTER — ROUTINE PRENATAL (OUTPATIENT)
Dept: PERINATAL CARE | Facility: OTHER | Age: 35
End: 2020-10-13
Payer: COMMERCIAL

## 2020-10-13 VITALS
SYSTOLIC BLOOD PRESSURE: 103 MMHG | DIASTOLIC BLOOD PRESSURE: 67 MMHG | HEIGHT: 64 IN | HEART RATE: 85 BPM | BODY MASS INDEX: 25.4 KG/M2 | WEIGHT: 148.8 LBS | TEMPERATURE: 97.7 F

## 2020-10-13 DIAGNOSIS — O09.522 MULTIGRAVIDA OF ADVANCED MATERNAL AGE IN SECOND TRIMESTER: Primary | ICD-10-CM

## 2020-10-13 DIAGNOSIS — Z36.86 ENCOUNTER FOR ANTENATAL SCREENING FOR CERVICAL LENGTH: ICD-10-CM

## 2020-10-13 DIAGNOSIS — Z34.92 SECOND TRIMESTER PREGNANCY: ICD-10-CM

## 2020-10-13 DIAGNOSIS — Z3A.20 20 WEEKS GESTATION OF PREGNANCY: ICD-10-CM

## 2020-10-13 DIAGNOSIS — O34.219 PREGNANCY WITH HISTORY OF CESAREAN SECTION, ANTEPARTUM: ICD-10-CM

## 2020-10-13 PROCEDURE — 76811 OB US DETAILED SNGL FETUS: CPT | Performed by: OBSTETRICS & GYNECOLOGY

## 2020-10-13 PROCEDURE — 76817 TRANSVAGINAL US OBSTETRIC: CPT | Performed by: OBSTETRICS & GYNECOLOGY

## 2020-10-16 ENCOUNTER — TELEPHONE (OUTPATIENT)
Dept: GASTROENTEROLOGY | Facility: CLINIC | Age: 35
End: 2020-10-16

## 2020-10-22 ENCOUNTER — ROUTINE PRENATAL (OUTPATIENT)
Dept: OBGYN CLINIC | Age: 35
End: 2020-10-22

## 2020-10-22 VITALS — WEIGHT: 147 LBS | BODY MASS INDEX: 25.23 KG/M2 | SYSTOLIC BLOOD PRESSURE: 118 MMHG | DIASTOLIC BLOOD PRESSURE: 80 MMHG

## 2020-10-22 DIAGNOSIS — O09.522 MULTIGRAVIDA OF ADVANCED MATERNAL AGE IN SECOND TRIMESTER: ICD-10-CM

## 2020-10-22 DIAGNOSIS — Z98.891 HISTORY OF C-SECTION: ICD-10-CM

## 2020-10-22 DIAGNOSIS — F41.8 DEPRESSION WITH ANXIETY: ICD-10-CM

## 2020-10-22 DIAGNOSIS — G43.009 MIGRAINE WITHOUT AURA AND WITHOUT STATUS MIGRAINOSUS, NOT INTRACTABLE: ICD-10-CM

## 2020-10-22 DIAGNOSIS — O23.42 URINARY TRACT INFECTION IN MOTHER DURING SECOND TRIMESTER OF PREGNANCY: Primary | ICD-10-CM

## 2020-10-22 LAB
BACTERIA UR QL AUTO: ABNORMAL /HPF
BILIRUB UR QL STRIP: NEGATIVE
CLARITY UR: ABNORMAL
COLOR UR: ABNORMAL
GLUCOSE UR STRIP-MCNC: ABNORMAL MG/DL
HGB UR QL STRIP.AUTO: NEGATIVE
KETONES UR STRIP-MCNC: ABNORMAL MG/DL
LEUKOCYTE ESTERASE UR QL STRIP: NEGATIVE
MUCOUS THREADS UR QL AUTO: ABNORMAL
NITRITE UR QL STRIP: NEGATIVE
NON-SQ EPI CELLS URNS QL MICRO: ABNORMAL /HPF
PH UR STRIP.AUTO: 7 [PH]
PROT UR STRIP-MCNC: ABNORMAL MG/DL
RBC #/AREA URNS AUTO: ABNORMAL /HPF
SL AMB  POCT GLUCOSE, UA: NORMAL
SL AMB POCT URINE PROTEIN: NORMAL
SP GR UR STRIP.AUTO: 1.02 (ref 1–1.03)
UROBILINOGEN UR QL STRIP.AUTO: 0.2 E.U./DL
WBC #/AREA URNS AUTO: ABNORMAL /HPF

## 2020-10-22 PROCEDURE — PNV: Performed by: STUDENT IN AN ORGANIZED HEALTH CARE EDUCATION/TRAINING PROGRAM

## 2020-10-22 PROCEDURE — 81001 URINALYSIS AUTO W/SCOPE: CPT | Performed by: STUDENT IN AN ORGANIZED HEALTH CARE EDUCATION/TRAINING PROGRAM

## 2020-10-22 PROCEDURE — 87086 URINE CULTURE/COLONY COUNT: CPT | Performed by: STUDENT IN AN ORGANIZED HEALTH CARE EDUCATION/TRAINING PROGRAM

## 2020-10-22 RX ORDER — CEPHALEXIN 500 MG/1
500 CAPSULE ORAL EVERY 6 HOURS SCHEDULED
Qty: 28 CAPSULE | Refills: 0 | Status: SHIPPED | OUTPATIENT
Start: 2020-10-22 | End: 2020-10-29

## 2020-10-23 LAB — BACTERIA UR CULT: NORMAL

## 2020-10-26 ENCOUNTER — TELEPHONE (OUTPATIENT)
Dept: GASTROENTEROLOGY | Facility: CLINIC | Age: 35
End: 2020-10-26

## 2020-10-30 ENCOUNTER — TELEPHONE (OUTPATIENT)
Dept: GASTROENTEROLOGY | Facility: CLINIC | Age: 35
End: 2020-10-30

## 2020-10-30 ENCOUNTER — OFFICE VISIT (OUTPATIENT)
Dept: GASTROENTEROLOGY | Facility: CLINIC | Age: 35
End: 2020-10-30
Payer: COMMERCIAL

## 2020-10-30 VITALS
SYSTOLIC BLOOD PRESSURE: 96 MMHG | BODY MASS INDEX: 24.92 KG/M2 | HEART RATE: 68 BPM | TEMPERATURE: 97.5 F | WEIGHT: 146 LBS | DIASTOLIC BLOOD PRESSURE: 62 MMHG | HEIGHT: 64 IN

## 2020-10-30 DIAGNOSIS — K59.00 CONSTIPATION, UNSPECIFIED CONSTIPATION TYPE: ICD-10-CM

## 2020-10-30 DIAGNOSIS — K62.89 RECTAL PAIN: Primary | ICD-10-CM

## 2020-10-30 DIAGNOSIS — R19.7 DIARRHEA, UNSPECIFIED TYPE: ICD-10-CM

## 2020-10-30 DIAGNOSIS — K62.5 RECTAL BLEEDING: ICD-10-CM

## 2020-10-30 PROCEDURE — 99213 OFFICE O/P EST LOW 20 MIN: CPT | Performed by: PHYSICIAN ASSISTANT

## 2020-10-30 RX ORDER — LIDOCAINE 40 MG/G
CREAM TOPICAL AS NEEDED
Qty: 30 G | Refills: 0 | Status: SHIPPED | OUTPATIENT
Start: 2020-10-30 | End: 2021-03-22

## 2020-11-09 DIAGNOSIS — K21.9 GASTROESOPHAGEAL REFLUX DISEASE, UNSPECIFIED WHETHER ESOPHAGITIS PRESENT: Primary | ICD-10-CM

## 2020-11-09 RX ORDER — PANTOPRAZOLE SODIUM 40 MG/1
40 TABLET, DELAYED RELEASE ORAL 2 TIMES DAILY
Qty: 180 TABLET | Refills: 3 | Status: SHIPPED | OUTPATIENT
Start: 2020-11-09 | End: 2020-12-21

## 2020-11-16 ENCOUNTER — ROUTINE PRENATAL (OUTPATIENT)
Dept: OBGYN CLINIC | Facility: CLINIC | Age: 35
End: 2020-11-16

## 2020-11-16 VITALS — BODY MASS INDEX: 26.09 KG/M2 | DIASTOLIC BLOOD PRESSURE: 70 MMHG | SYSTOLIC BLOOD PRESSURE: 110 MMHG | WEIGHT: 152 LBS

## 2020-11-16 DIAGNOSIS — Z98.891 HISTORY OF C-SECTION: ICD-10-CM

## 2020-11-16 DIAGNOSIS — O09.522 MULTIGRAVIDA OF ADVANCED MATERNAL AGE IN SECOND TRIMESTER: Primary | ICD-10-CM

## 2020-11-16 DIAGNOSIS — O34.219 PREGNANCY WITH HISTORY OF CESAREAN SECTION, ANTEPARTUM: ICD-10-CM

## 2020-11-16 LAB
SL AMB  POCT GLUCOSE, UA: NORMAL
SL AMB POCT URINE PROTEIN: NORMAL

## 2020-11-16 PROCEDURE — PNV: Performed by: OBSTETRICS & GYNECOLOGY

## 2020-11-18 ENCOUNTER — CONSULT (OUTPATIENT)
Dept: NEUROLOGY | Facility: CLINIC | Age: 35
End: 2020-11-18
Payer: COMMERCIAL

## 2020-11-18 VITALS
HEART RATE: 96 BPM | SYSTOLIC BLOOD PRESSURE: 98 MMHG | DIASTOLIC BLOOD PRESSURE: 56 MMHG | WEIGHT: 152 LBS | TEMPERATURE: 99.4 F | BODY MASS INDEX: 25.95 KG/M2 | HEIGHT: 64 IN

## 2020-11-18 DIAGNOSIS — G43.009 MIGRAINE WITHOUT AURA AND WITHOUT STATUS MIGRAINOSUS, NOT INTRACTABLE: ICD-10-CM

## 2020-11-18 PROCEDURE — 99244 OFF/OP CNSLTJ NEW/EST MOD 40: CPT | Performed by: PSYCHIATRY & NEUROLOGY

## 2020-12-04 ENCOUNTER — TELEPHONE (OUTPATIENT)
Dept: OBGYN CLINIC | Facility: CLINIC | Age: 35
End: 2020-12-04

## 2020-12-08 ENCOUNTER — ROUTINE PRENATAL (OUTPATIENT)
Dept: OBGYN CLINIC | Facility: CLINIC | Age: 35
End: 2020-12-08
Payer: COMMERCIAL

## 2020-12-08 DIAGNOSIS — Z34.83 PRENATAL CARE, SUBSEQUENT PREGNANCY, THIRD TRIMESTER: Primary | ICD-10-CM

## 2020-12-08 DIAGNOSIS — O34.219 PREGNANCY WITH HISTORY OF CESAREAN SECTION, ANTEPARTUM: ICD-10-CM

## 2020-12-08 DIAGNOSIS — O09.522 MULTIGRAVIDA OF ADVANCED MATERNAL AGE IN SECOND TRIMESTER: ICD-10-CM

## 2020-12-08 DIAGNOSIS — Z23 NEED FOR IMMUNIZATION AGAINST PERTUSSIS: ICD-10-CM

## 2020-12-08 DIAGNOSIS — G43.009 MIGRAINE WITHOUT AURA AND WITHOUT STATUS MIGRAINOSUS, NOT INTRACTABLE: ICD-10-CM

## 2020-12-08 LAB
SL AMB  POCT GLUCOSE, UA: NORMAL
SL AMB POCT URINE PROTEIN: NORMAL

## 2020-12-08 PROCEDURE — 90471 IMMUNIZATION ADMIN: CPT

## 2020-12-08 PROCEDURE — PNV: Performed by: STUDENT IN AN ORGANIZED HEALTH CARE EDUCATION/TRAINING PROGRAM

## 2020-12-08 PROCEDURE — 90715 TDAP VACCINE 7 YRS/> IM: CPT

## 2020-12-08 RX ORDER — CALCIUM CARBONATE 200(500)MG
2 TABLET,CHEWABLE ORAL
COMMUNITY

## 2020-12-21 ENCOUNTER — ROUTINE PRENATAL (OUTPATIENT)
Dept: OBGYN CLINIC | Facility: CLINIC | Age: 35
End: 2020-12-21

## 2020-12-21 VITALS — SYSTOLIC BLOOD PRESSURE: 102 MMHG | WEIGHT: 155 LBS | DIASTOLIC BLOOD PRESSURE: 62 MMHG | BODY MASS INDEX: 26.61 KG/M2

## 2020-12-21 DIAGNOSIS — O09.523 MULTIGRAVIDA OF ADVANCED MATERNAL AGE IN THIRD TRIMESTER: ICD-10-CM

## 2020-12-21 DIAGNOSIS — O34.219 PREGNANCY WITH HISTORY OF CESAREAN SECTION, ANTEPARTUM: ICD-10-CM

## 2020-12-21 DIAGNOSIS — Z3A.30 30 WEEKS GESTATION OF PREGNANCY: ICD-10-CM

## 2020-12-21 DIAGNOSIS — O34.219 DESIRES VBAC (VAGINAL BIRTH AFTER CESAREAN) TRIAL: ICD-10-CM

## 2020-12-21 DIAGNOSIS — Z34.83 PRENATAL CARE, SUBSEQUENT PREGNANCY IN THIRD TRIMESTER: Primary | ICD-10-CM

## 2020-12-21 PROCEDURE — PNV: Performed by: OBSTETRICS & GYNECOLOGY

## 2021-01-04 ENCOUNTER — TELEPHONE (OUTPATIENT)
Dept: PERINATAL CARE | Facility: CLINIC | Age: 36
End: 2021-01-04

## 2021-01-04 NOTE — TELEPHONE ENCOUNTER
Attempted to call pt to pre screen called the number that is listed on the chart, the phone just rang busy   No covid pre screen 1/4

## 2021-01-05 ENCOUNTER — ULTRASOUND (OUTPATIENT)
Dept: PERINATAL CARE | Facility: OTHER | Age: 36
End: 2021-01-05
Payer: COMMERCIAL

## 2021-01-05 ENCOUNTER — ROUTINE PRENATAL (OUTPATIENT)
Dept: OBGYN CLINIC | Facility: CLINIC | Age: 36
End: 2021-01-05

## 2021-01-05 VITALS — WEIGHT: 161 LBS | BODY MASS INDEX: 27.64 KG/M2 | SYSTOLIC BLOOD PRESSURE: 102 MMHG | DIASTOLIC BLOOD PRESSURE: 64 MMHG

## 2021-01-05 VITALS
WEIGHT: 162.8 LBS | BODY MASS INDEX: 27.79 KG/M2 | SYSTOLIC BLOOD PRESSURE: 103 MMHG | HEART RATE: 92 BPM | HEIGHT: 64 IN | DIASTOLIC BLOOD PRESSURE: 68 MMHG

## 2021-01-05 DIAGNOSIS — O34.219 DESIRES VBAC (VAGINAL BIRTH AFTER CESAREAN) TRIAL: ICD-10-CM

## 2021-01-05 DIAGNOSIS — O40.3XX0 POLYHYDRAMNIOS IN SINGLETON PREGNANCY IN THIRD TRIMESTER: ICD-10-CM

## 2021-01-05 DIAGNOSIS — Z98.891 HISTORY OF 2 CESAREAN SECTIONS: ICD-10-CM

## 2021-01-05 DIAGNOSIS — O09.523 ELDERLY MULTIGRAVIDA, THIRD TRIMESTER: ICD-10-CM

## 2021-01-05 DIAGNOSIS — O09.523 MULTIGRAVIDA OF ADVANCED MATERNAL AGE IN THIRD TRIMESTER: Primary | ICD-10-CM

## 2021-01-05 DIAGNOSIS — Z3A.32 32 WEEKS GESTATION OF PREGNANCY: Primary | ICD-10-CM

## 2021-01-05 PROCEDURE — 76816 OB US FOLLOW-UP PER FETUS: CPT | Performed by: OBSTETRICS & GYNECOLOGY

## 2021-01-05 PROCEDURE — PNV: Performed by: OBSTETRICS & GYNECOLOGY

## 2021-01-05 PROCEDURE — 99213 OFFICE O/P EST LOW 20 MIN: CPT | Performed by: OBSTETRICS & GYNECOLOGY

## 2021-01-05 NOTE — LETTER
January 5, 1365     Brandi Ibarra, DO  [address]   [address]  [facility]    Patient: Melodie Fuchs   YOB: 1985   Date of Visit: 1/5/2021       Dear Dr Mohsen Mcbride:    Thank you for referring Melodie Fuchs to me for evaluation  Below are my notes for this consultation  If you have questions, please do not hesitate to call me  I look forward to following your patient along with you  Sincerely,        Suresh Agustin MD        CC: No Recipients  Suresh Agustin MD  1/5/2021  8:11 AM  Sign when Signing Visit  Please refer to the [facility] ultrasound report in Ob Procedures for additional information regarding today's visit

## 2021-01-05 NOTE — PROGRESS NOTES
Please refer to the [facility] ultrasound report in Ob Procedures for additional information regarding today's visit

## 2021-01-05 NOTE — PATIENT INSTRUCTIONS
Below are the statements from the [address] [place] [address] of Maternal Fetal Medicine regarding COVID-19 vaccination and pregnancy  ACOG:  [employer] [facility]:  [email or web address]  [email or web address] [email or web address]_(final)  pdf

## 2021-01-05 NOTE — PATIENT INSTRUCTIONS
Kick Counts in Pregnancy   WHAT YOU NEED TO KNOW:   Kick counts measure how much your baby is moving in your womb  A kick from your baby can be felt as a twist, turn, swish, roll, or jab  It is common to feel your baby kicking at 26 to 28 weeks of pregnancy  You may feel your baby kick as early as 20 weeks of pregnancy  You may want to start counting at 28 weeks  DISCHARGE INSTRUCTIONS:   Contact your healthcare provider immediately if:   · You feel a change in the number of kicks or movements of your baby  · You feel fewer than 10 kicks within 2 hours  · You have questions or concerns about your baby's movements  Why measure kick counts:  Your baby's movement may provide information about your baby's health  He or she may move less, or not at all, if there are problems  Your baby may move less if he or she is not getting enough oxygen or nutrition from the placenta  Do not smoke while you are pregnant  Smoking decreases the amount of oxygen that gets to your baby  Talk to your healthcare provider if you need help to quit smoking  Tell your healthcare provider as soon as you feel a change in your baby's movements  When to measure kick counts:   · Measure kick counts at the same time every day  · Measure kick counts when your baby is awake and most active  Your baby may be most active in the evening  How to measure kick counts:  Check that your baby is awake before you measure kick counts  You can wake up your baby by lightly pushing on your belly, walking, or drinking something cold  Your healthcare provider may tell you different ways to measure kick counts  You may be told to do the following:  · Use a chart or clock to keep track of the time you start and finish counting  · Sit in a chair or lie on your left side  · Place your hands on the largest part of your belly  · Count until you reach 10 kicks  Write down how much time it takes to count 10 kicks       · It may take 30 minutes to 2 hours to count 10 kicks  It should not take more than 2 hours to count 10 kicks  Follow up with your healthcare provider as directed:  Write down your questions so you remember to ask them during your visits  © Copyright [address] Information is for End User's use only and may not be sold, redistributed or otherwise used for commercial purposes  All illustrations and images included in CareNotes® are the copyrighted property of A D A M , Inc  or [facility] [employer]   The above information is an  only  It is not intended as medical advice for individual conditions or treatments  Talk to your doctor, nurse or pharmacist before following any medical regimen to see if it is safe and effective for you

## 2021-01-05 NOTE — PROGRESS NOTES
38 y o  [id number]  female at 27 2 wga EGA for PNV  BP : 102/64  TW  Patient is feeling well  She has no complaints  Two prior  sections and planning a trial of labor - reviewed the risks and benefits  Discussed epidural versus not epidural   PNC growth yesterday - overall growth at the 50th percentile but fetal HC greater than the 95th  Mild polyhydramnios  Patient will do a repeat Glucola  Repeat growth at 36 weeks  Reviewed COVID precautions and COVID vaccine  Patient considering getting the vaccine aware that  Center would like to follow her if she does    Reviewed  labor precautions and fetal kick counts  Follow-up in 2 weeks

## 2021-01-15 LAB — GLUCOSE 1H P 50 G GLC PO SERPL-MCNC: 129 MG/DL

## 2021-01-21 ENCOUNTER — ROUTINE PRENATAL (OUTPATIENT)
Dept: OBGYN CLINIC | Facility: CLINIC | Age: 36
End: 2021-01-21

## 2021-01-21 ENCOUNTER — TELEPHONE (OUTPATIENT)
Dept: PERINATAL CARE | Facility: CLINIC | Age: 36
End: 2021-01-21

## 2021-01-21 VITALS — WEIGHT: 164.6 LBS | DIASTOLIC BLOOD PRESSURE: 60 MMHG | BODY MASS INDEX: 28.25 KG/M2 | SYSTOLIC BLOOD PRESSURE: 112 MMHG

## 2021-01-21 DIAGNOSIS — O09.523 MULTIGRAVIDA OF ADVANCED MATERNAL AGE IN THIRD TRIMESTER: ICD-10-CM

## 2021-01-21 DIAGNOSIS — Z98.891 HISTORY OF 2 CESAREAN SECTIONS: Primary | ICD-10-CM

## 2021-01-21 DIAGNOSIS — Z34.83 PRENATAL CARE, SUBSEQUENT PREGNANCY IN THIRD TRIMESTER: ICD-10-CM

## 2021-01-21 DIAGNOSIS — O34.219 DESIRES VBAC (VAGINAL BIRTH AFTER CESAREAN) TRIAL: ICD-10-CM

## 2021-01-21 PROCEDURE — PNV: Performed by: OBSTETRICS & GYNECOLOGY

## 2021-01-21 NOTE — TELEPHONE ENCOUNTER
Spoke with pt and informed her of 1hr glucose results  Pt receptive and declines questions at this time

## 2021-01-21 NOTE — PROGRESS NOTES
Patient interested in breast pump, will submit order on her behalf (Medela, double electric)  Notes she is out of breath more so than any other pregnancy or she gets very dizzy and SOB as well as increased sweating  Urine neg/neg

## 2021-01-21 NOTE — PROGRESS NOTES
This is a 38 y o  [id number] at 34w3d who presents for return OB visit  No complaints  Denies contractions, leakage, bleeding  Endorses fetal movement   BP: 112/60 TWlb    Having intermittent episodes of feeling weak only with standing  Discussed frequent changing position throughout the day     Had repeat glucola which was normal  TOLA2C: reviewed spontaneous labor, movement during labor, risks of uterine rupture  Will check fetal position at next appt  F/up 2 wks

## 2021-01-21 NOTE — TELEPHONE ENCOUNTER
----- Message from Lisbeth Murillo MD sent at 1/15/2021  5:56 PM EST -----  I reviewed the lab study today and the results are normal

## 2021-01-22 ENCOUNTER — TELEPHONE (OUTPATIENT)
Dept: OBGYN CLINIC | Facility: CLINIC | Age: 36
End: 2021-01-22

## 2021-02-04 ENCOUNTER — TELEPHONE (OUTPATIENT)
Dept: PERINATAL CARE | Facility: CLINIC | Age: 36
End: 2021-02-04

## 2021-02-04 NOTE — TELEPHONE ENCOUNTER
Attempted to reach patient by phone and left voicemail to confirm appointment for MFM ultrasound  1 support person ( must be over the age of 15) may accompany you for your appointment  If you or your support person have traveled outside the state in the past 2 weeks, please call and notify our office today #[phone]  You and your support person must wear a mask ,covering nose and mouth,during your entire visit  To minimize your exposure in our waiting room, please call our office prior to entering the building  Check in and rooming questions will be done via phone  We will give you directions when to enter for your appointment  [address]: [phone]    IF you are not feeling well- cough, fever, shortness of breath or any flu like symptoms, contact your primary care physician or [address] [place] [address]  If you are awaiting COVID 19 test results please call and reschedule your appointment    Any questions with these instructions please call Maternal Fetal Medicine nurse line today @ # [phone]

## 2021-02-05 ENCOUNTER — ULTRASOUND (OUTPATIENT)
Dept: PERINATAL CARE | Facility: OTHER | Age: 36
End: 2021-02-05
Payer: COMMERCIAL

## 2021-02-05 ENCOUNTER — ROUTINE PRENATAL (OUTPATIENT)
Dept: OBGYN CLINIC | Facility: CLINIC | Age: 36
End: 2021-02-05

## 2021-02-05 VITALS
DIASTOLIC BLOOD PRESSURE: 65 MMHG | WEIGHT: 170.4 LBS | HEIGHT: 64 IN | HEART RATE: 93 BPM | SYSTOLIC BLOOD PRESSURE: 103 MMHG | BODY MASS INDEX: 29.09 KG/M2

## 2021-02-05 VITALS — SYSTOLIC BLOOD PRESSURE: 110 MMHG | WEIGHT: 167.2 LBS | DIASTOLIC BLOOD PRESSURE: 68 MMHG | BODY MASS INDEX: 28.7 KG/M2

## 2021-02-05 DIAGNOSIS — Z3A.36 36 WEEKS GESTATION OF PREGNANCY: ICD-10-CM

## 2021-02-05 DIAGNOSIS — O34.219 DESIRES VBAC (VAGINAL BIRTH AFTER CESAREAN) TRIAL: ICD-10-CM

## 2021-02-05 DIAGNOSIS — O40.3XX0 POLYHYDRAMNIOS IN SINGLETON PREGNANCY IN THIRD TRIMESTER: Primary | ICD-10-CM

## 2021-02-05 DIAGNOSIS — O09.523 MULTIGRAVIDA OF ADVANCED MATERNAL AGE IN THIRD TRIMESTER: ICD-10-CM

## 2021-02-05 DIAGNOSIS — Z34.83 ENCOUNTER FOR SUPERVISION OF OTHER NORMAL PREGNANCY IN THIRD TRIMESTER: Primary | ICD-10-CM

## 2021-02-05 PROCEDURE — 87653 STREP B DNA AMP PROBE: CPT | Performed by: OBSTETRICS & GYNECOLOGY

## 2021-02-05 PROCEDURE — PNV: Performed by: OBSTETRICS & GYNECOLOGY

## 2021-02-05 PROCEDURE — 76816 OB US FOLLOW-UP PER FETUS: CPT | Performed by: OBSTETRICS & GYNECOLOGY

## 2021-02-05 PROCEDURE — 76819 FETAL BIOPHYS PROFIL W/O NST: CPT | Performed by: OBSTETRICS & GYNECOLOGY

## 2021-02-05 PROCEDURE — 99213 OFFICE O/P EST LOW 20 MIN: CPT | Performed by: OBSTETRICS & GYNECOLOGY

## 2021-02-05 NOTE — PATIENT INSTRUCTIONS
Kick Counts in Pregnancy   WHAT YOU NEED TO KNOW:   Kick counts measure how much your baby is moving in your womb  A kick from your baby can be felt as a twist, turn, swish, roll, or jab  It is common to feel your baby kicking at 26 to 28 weeks of pregnancy  You may feel your baby kick as early as 20 weeks of pregnancy  You may want to start counting at 28 weeks  DISCHARGE INSTRUCTIONS:   Contact your healthcare provider immediately if:   · You feel a change in the number of kicks or movements of your baby  · You feel fewer than 10 kicks within 2 hours  · You have questions or concerns about your baby's movements  Why measure kick counts:  Your baby's movement may provide information about your baby's health  He or she may move less, or not at all, if there are problems  Your baby may move less if he or she is not getting enough oxygen or nutrition from the placenta  Do not smoke while you are pregnant  Smoking decreases the amount of oxygen that gets to your baby  Talk to your healthcare provider if you need help to quit smoking  Tell your healthcare provider as soon as you feel a change in your baby's movements  When to measure kick counts:   · Measure kick counts at the same time every day  · Measure kick counts when your baby is awake and most active  Your baby may be most active in the evening  How to measure kick counts:  Check that your baby is awake before you measure kick counts  You can wake up your baby by lightly pushing on your belly, walking, or drinking something cold  Your healthcare provider may tell you different ways to measure kick counts  You may be told to do the following:  · Use a chart or clock to keep track of the time you start and finish counting  · Sit in a chair or lie on your left side  · Place your hands on the largest part of your belly  · Count until you reach 10 kicks  Write down how much time it takes to count 10 kicks       · It may take 30 minutes to 2 hours to count 10 kicks  It should not take more than 2 hours to count 10 kicks  Follow up with your healthcare provider as directed:  Write down your questions so you remember to ask them during your visits  © Copyright [address] Information is for End User's use only and may not be sold, redistributed or otherwise used for commercial purposes  All illustrations and images included in CareNotes® are the copyrighted property of A D A M , Inc  or [facility] [employer]   The above information is an  only  It is not intended as medical advice for individual conditions or treatments  Talk to your doctor, nurse or pharmacist before following any medical regimen to see if it is safe and effective for you

## 2021-02-05 NOTE — LETTER
To whom it may concern,    Due to the risk of COVID19, it is recommended that if possible, [facility] be given the opportunity to work from home, and quarantine 14 days prior to delivery  Please call the office if you have questions or concerns      Sincerely,                Kyle Smith MD, [address] [facility]

## 2021-02-05 NOTE — PROGRESS NOTES
GBS to be done today  Reports she is terribly fatigued  Notes increased pelvic pressure and irregular contractions as well as increased discharge  Urine neg/neg

## 2021-02-05 NOTE — PROGRESS NOTES
Please refer to the [facility] ultrasound report in Ob Procedures for additional information regarding today's visit

## 2021-02-05 NOTE — LETTER
February 5, 2021     Brandi Gillespie DO  [address] [facility]  [address]    Patient: Kathie Webster   YOB: 1985   Date of Visit: 2/5/2021       Dear Dr Breana Lux:    Thank you for referring Kathie Webster to me for evaluation  Below are my notes for this consultation  If you have questions, please do not hesitate to call me  I look forward to following your patient along with you  Sincerely,        Bren John MD        CC: No Recipients  Bren John MD  2/5/2021  7:55 AM  Sign when Signing Visit   Please refer to the [facility] ultrasound report in Ob Procedures for additional information regarding today's visit

## 2021-02-06 NOTE — PROGRESS NOTES
38 y o    female at 42 4 wga EGA for PNV  BP : 110/68  TW  Feeling tired and sore  Polyhydramnios - reactive NST  Transverse  SVE cl/th/hi  PNC today shows   AC              34 8 cm        38 weeks 6 days* (93%)   BPD              9 6 cm        39 weeks 3 days* (>95%)   HC              34 4 cm        39 weeks 1 day * (86%)   Femur            6 9 cm        35 weeks 1 day * (31%)      HC/AC           0 99   FL/AC           0 20   FL/BPD          0 72   EFW (Ac/Fl/Hc)  3356 grams - 7 lbs 6 oz                 (82%)    Reviewed TOAC vs Rc/s - pt would like trial of labor and understands she will not be induced  Considering planning a C/S if no labor by 39 wga    Reviewed labor precautiosn and FKCs  F/u 1 week

## 2021-02-07 LAB — GP B STREP DNA SPEC QL NAA+PROBE: NORMAL

## 2021-02-08 ENCOUNTER — TELEPHONE (OUTPATIENT)
Dept: PERINATAL CARE | Facility: CLINIC | Age: 36
End: 2021-02-08

## 2021-02-08 NOTE — TELEPHONE ENCOUNTER
Called patient to schedule nst/bpp for this week  Spoke to patient and she stated she is having the nst at her ob and she did not want to schedule with MFM at this time

## 2021-02-11 ENCOUNTER — ROUTINE PRENATAL (OUTPATIENT)
Dept: OBGYN CLINIC | Facility: CLINIC | Age: 36
End: 2021-02-11

## 2021-02-11 VITALS — DIASTOLIC BLOOD PRESSURE: 62 MMHG | WEIGHT: 167 LBS | BODY MASS INDEX: 28.67 KG/M2 | SYSTOLIC BLOOD PRESSURE: 116 MMHG

## 2021-02-11 DIAGNOSIS — O09.523 MULTIGRAVIDA OF ADVANCED MATERNAL AGE IN THIRD TRIMESTER: ICD-10-CM

## 2021-02-11 DIAGNOSIS — O34.219 DESIRES VBAC (VAGINAL BIRTH AFTER CESAREAN) TRIAL: Primary | ICD-10-CM

## 2021-02-11 PROCEDURE — PNV: Performed by: OBSTETRICS & GYNECOLOGY

## 2021-02-11 NOTE — PROGRESS NOTES
38 y o  [id number]  female at 42 1 wga EGA for PNV  BP : 116/62  TW  Patient is feeling well  She has no complaints  Two prior  sections and desires trial of labor - understands that she will not be induced  Currently the baby is in transverse position  She understands that she cannot labor if the baby is not vertex  At this point she is not willing to planned  date  Understands that at next visit a delivery plan needs to be created  Polyhydramnios - reviewed with patient the current guidelines are delivery during the 39th week    Reactive NST  LISSETTE is 27

## 2021-02-16 ENCOUNTER — HOSPITAL ENCOUNTER (OUTPATIENT)
Facility: HOSPITAL | Age: 36
Discharge: HOME | End: 2021-02-16
Attending: OBSTETRICS & GYNECOLOGY | Admitting: OBSTETRICS & GYNECOLOGY
Payer: COMMERCIAL

## 2021-02-16 ENCOUNTER — ROUTINE PRENATAL (OUTPATIENT)
Dept: OBGYN CLINIC | Facility: CLINIC | Age: 36
End: 2021-02-16

## 2021-02-16 VITALS — DIASTOLIC BLOOD PRESSURE: 72 MMHG | WEIGHT: 168 LBS | BODY MASS INDEX: 28.84 KG/M2 | SYSTOLIC BLOOD PRESSURE: 118 MMHG

## 2021-02-16 VITALS — TEMPERATURE: 97.5 F | HEART RATE: 111 BPM | RESPIRATION RATE: 18 BRPM

## 2021-02-16 DIAGNOSIS — O40.3XX0 POLYHYDRAMNIOS IN THIRD TRIMESTER COMPLICATION, SINGLE OR UNSPECIFIED FETUS: ICD-10-CM

## 2021-02-16 DIAGNOSIS — O34.219 PREGNANCY WITH HISTORY OF CESAREAN SECTION, ANTEPARTUM: Primary | ICD-10-CM

## 2021-02-16 DIAGNOSIS — Z3A.38 38 WEEKS GESTATION OF PREGNANCY: ICD-10-CM

## 2021-02-16 DIAGNOSIS — O09.523 MULTIGRAVIDA OF ADVANCED MATERNAL AGE IN THIRD TRIMESTER: ICD-10-CM

## 2021-02-16 DIAGNOSIS — O34.219 DESIRES VBAC (VAGINAL BIRTH AFTER CESAREAN) TRIAL: ICD-10-CM

## 2021-02-16 PROCEDURE — NC001 PR NO CHARGE: Performed by: OBSTETRICS & GYNECOLOGY

## 2021-02-16 PROCEDURE — 59025 FETAL NON-STRESS TEST: CPT | Performed by: OBSTETRICS & GYNECOLOGY

## 2021-02-16 PROCEDURE — PNV: Performed by: OBSTETRICS & GYNECOLOGY

## 2021-02-16 PROCEDURE — 99213 OFFICE O/P EST LOW 20 MIN: CPT

## 2021-02-16 NOTE — PROGRESS NOTES
Triage Note - OB  Junie Fraga 38 y o  female MRN: [id number]  Unit/Bed#: LD TRIAGE 1-01 Encounter: [id number]    OB TRIAGE NOTE  Hyacinth Tabares  [id number]  2/16/2021  6:50 PM  LD TRIAGE 1/LD TRIAGE 1-*    Assessment/Plan:  38 y o  [id number] 38w1d with reactive NST, fetus in vertex presentation and cervical exam of 2 5-3/50/-3  Discharge instructions  Patient instructed to call if experiencing worsening contractions, vaginal bleeding, loss of fluid or decreased fetal movement  Arrangements will be made for follow up appointment later this week  Plan of care discussed with Dr Debbie Nielsen   ______________    SUBJECTIVE    SENA: Estimated Date of Delivery: 3/1/21    HPI Chronology:  38 y o  [id number] 38w1d presents for prolonged monitoring after having possible variable decelerations at a prenatal visit earlier today  Contractions: rare  Leakage: no  Bleeding: no  Fetal Movement: normal  Pelvic pain: no    Vitals:   Pulse (!) 111   Temp 97 5 °F (36 4 °C) (Oral)   Resp 18   LMP 05/25/2020 (Exact Date)   There is no height or weight on file to calculate BMI  Review of Systems   Constitutional: Negative for chills and fever  HENT: Negative for ear pain and sore throat  Eyes: Negative for pain and visual disturbance  Respiratory: Negative for cough and shortness of breath  Cardiovascular: Negative for chest pain and palpitations  Gastrointestinal: Negative for abdominal pain and vomiting  Genitourinary: Negative for dysuria and hematuria  Musculoskeletal: Negative for arthralgias and back pain  Skin: Negative for color change and rash  Neurological: Negative for seizures and syncope  All other systems reviewed and are negative  Physical Exam  Constitutional:       Appearance: Normal appearance  Abdominal:      Palpations: Abdomen is soft  Tenderness: There is no abdominal tenderness  Skin:     General: Skin is warm and dry     Neurological:      Mental Status: She is alert    Psychiatric:         Mood and Affect: Mood normal          Behavior: Behavior normal          SVE: 2 5-3/50/-3    FHT: 135/moderate variability/15x15 accelerations/no decelrations     TOCO: rare contractions and uterine irritability       Labs: No results found for this or any previous visit (from the past 24 hour(s))  Lab, Imaging and other studies: I have personally reviewed pertinent reports          Lior Branham MD  2/16/2021  6:50 PM

## 2021-02-16 NOTE — DISCHARGE INSTRUCTIONS
Kick Counts in Pregnancy   AMBULATORY CARE:   Kick counts  measure how much your baby is moving in your womb  A kick from your baby can be felt as a twist, turn, swish, roll, or jab  It is common to feel your baby kicking at 26 to 28 weeks of pregnancy  You may feel your baby kick as early as 20 weeks of pregnancy  You may want to start counting at 28 weeks  Contact your healthcare provider immediately if:   · You feel a change in the number of kicks or movements of your baby  · You feel fewer than 10 kicks within 2 hours  · You have questions or concerns about your baby's movements  Why measure kick counts:  Your baby's movement may provide information about your baby's health  He or she may move less, or not at all, if there are problems  Your baby may move less if he or she is not getting enough oxygen or nutrition from the placenta  Do not smoke while you are pregnant  Smoking decreases the amount of oxygen that gets to your baby  Talk to your healthcare provider if you need help to quit smoking  Tell your healthcare provider as soon as you feel a change in your baby's movements  When to measure kick counts:   · Measure kick counts at the same time every day  · Measure kick counts when your baby is awake and most active  Your baby may be most active in the evening  How to measure kick counts:  Check that your baby is awake before you measure kick counts  You can wake up your baby by lightly pushing on your belly, walking, or drinking something cold  Your healthcare provider may tell you different ways to measure kick counts  You may be told to do the following:  · Use a chart or clock to keep track of the time you start and finish counting  · Sit in a chair or lie on your left side  · Place your hands on the largest part of your belly  · Count until you reach 10 kicks  Write down how much time it takes to count 10 kicks  · It may take 30 minutes to 2 hours to count 10 kicks  It should not take more than 2 hours to count 10 kicks  Follow up with your healthcare provider as directed:  Write down your questions so you remember to ask them during your visits  © Copyright [address] Information is for End User's use only and may not be sold, redistributed or otherwise used for commercial purposes  All illustrations and images included in CareNotes® are the copyrighted property of A D A M , Inc  or [facility] [employer]   The above information is an  only  It is not intended as medical advice for individual conditions or treatments  Talk to your doctor, nurse or pharmacist before following any medical regimen to see if it is safe and effective for you

## 2021-02-16 NOTE — PROGRESS NOTES
38 y o  [id number]  female at 38w1d EGA for PNV  BP : 118/72  TWlbs  NSt today - baseline 120s with possible variable decelerations  Sent to L&D for extended monitoring  Discussed that delivery may be indicated today based on tracing  Cervix was not dilated at last check, will need exam in triage  Discussed poly, TOLA2C, risk of rupture, cord prolapse, fetal compromise  Patient being followed for poly, fetus was transverse at last visit  Will need confirmation of position in triage

## 2021-02-16 NOTE — PROCEDURES
benjamín More [id number] at 38w1d with an SENA of 3/1/2021, by Ultrasound, was seen at [address] for the following procedure(s): $Procedure Type: NST]    Nonstress Test  Reason for NST: Other (Comment)(Possible variable deceleratiosn in the office at prenatal visit)  Variability: Moderate  Decelerations: None  Accelerations: Yes  Acoustic Stimulator: No  Baseline: 135 BPM  Uterine Irritability: Yes  Contractions: Irregular  Interpretation  Nonstress Test Interpretation: Warren Purcell MD  PGY-1, OB/GYN  2/16/2021   6:46 PM

## 2021-02-23 ENCOUNTER — HOSPITAL ENCOUNTER (OUTPATIENT)
Facility: HOSPITAL | Age: 36
Discharge: HOME | End: 2021-02-23
Attending: OBSTETRICS & GYNECOLOGY | Admitting: OBSTETRICS & GYNECOLOGY
Payer: COMMERCIAL

## 2021-02-23 ENCOUNTER — ROUTINE PRENATAL (OUTPATIENT)
Dept: OBGYN CLINIC | Facility: CLINIC | Age: 36
End: 2021-02-23
Payer: COMMERCIAL

## 2021-02-23 VITALS
BODY MASS INDEX: 29.02 KG/M2 | SYSTOLIC BLOOD PRESSURE: 111 MMHG | WEIGHT: 170 LBS | HEART RATE: 100 BPM | TEMPERATURE: 98.4 F | RESPIRATION RATE: 16 BRPM | DIASTOLIC BLOOD PRESSURE: 68 MMHG | HEIGHT: 64 IN

## 2021-02-23 VITALS — WEIGHT: 170.2 LBS | BODY MASS INDEX: 29.21 KG/M2 | DIASTOLIC BLOOD PRESSURE: 60 MMHG | SYSTOLIC BLOOD PRESSURE: 116 MMHG

## 2021-02-23 DIAGNOSIS — O40.3XX0 POLYHYDRAMNIOS IN THIRD TRIMESTER COMPLICATION, SINGLE OR UNSPECIFIED FETUS: Primary | ICD-10-CM

## 2021-02-23 DIAGNOSIS — O09.523 ELDERLY MULTIGRAVIDA, THIRD TRIMESTER: ICD-10-CM

## 2021-02-23 DIAGNOSIS — O09.523 MULTIGRAVIDA OF ADVANCED MATERNAL AGE IN THIRD TRIMESTER: ICD-10-CM

## 2021-02-23 DIAGNOSIS — O34.219 DESIRES VBAC (VAGINAL BIRTH AFTER CESAREAN) TRIAL: ICD-10-CM

## 2021-02-23 DIAGNOSIS — Z3A.39 39 WEEKS GESTATION OF PREGNANCY: ICD-10-CM

## 2021-02-23 PROCEDURE — PNV: Performed by: OBSTETRICS & GYNECOLOGY

## 2021-02-23 PROCEDURE — NC001 PR NO CHARGE: Performed by: OBSTETRICS & GYNECOLOGY

## 2021-02-23 PROCEDURE — 59025 FETAL NON-STRESS TEST: CPT | Performed by: OBSTETRICS & GYNECOLOGY

## 2021-02-23 PROCEDURE — 99213 OFFICE O/P EST LOW 20 MIN: CPT

## 2021-02-23 NOTE — PROGRESS NOTES
38 y o  [id number]  female at 39w1d EGA for PNV  BP : 116/60  TWlbs3 6oz  Patient presents for nonstress test and prenatal visit  We discussed need for delivery plan in light of 2 prior sections with polyhydramnios  Patient desires [facility]  Discussed risks of poly, TOLA2C, induction, continued pregnancy  Discussed concerns for uterine rupture, cord prolapse, placental abruption  LISSETTE today was 28  Baby is vertex  NST was equivocal  One accel 10x10  No decelerations  Baseline 140  [place] shows irritability with occasional contraction  We discussed at length risks to baby and mom with 2 prior sections, polyhydramnios and continued pregnancy  Discussed briefly with MFM, who recommends delivery by 39 6 if all other factors reassuring, including NST/fetal tracing  Discussed gentle induction with pitocin with low threshold for stopping if any signs of distress with baby or concerns for uterus  Discussed risk of PPH with polyhydramnios, big baby, prolonged labor  Recommend that patient goes to triage for extended monitoring and delivery today  Patient is hesitant and would like to continue pregnancy if tracing reassuring at the hospital  Discussed that if she does go home, I would recommend she RTO later this week for another non stress test, as well as plan for delivery  or Monday  I recommend she stay for induction today  Patient plans to go home and get her things and report to L&D for monitoring  All questions answered

## 2021-02-23 NOTE — PROGRESS NOTES
NST to be done today  Declines cervical check  Does note increased pelvic pressure, irregular contractions, diarrhea and nausea  Urine neg/neg

## 2021-02-24 ENCOUNTER — ULTRASOUND (OUTPATIENT)
Dept: PERINATAL CARE | Facility: OTHER | Age: 36
End: 2021-02-24
Payer: COMMERCIAL

## 2021-02-24 VITALS
DIASTOLIC BLOOD PRESSURE: 73 MMHG | HEIGHT: 64 IN | HEART RATE: 91 BPM | SYSTOLIC BLOOD PRESSURE: 115 MMHG | WEIGHT: 171.08 LBS | BODY MASS INDEX: 29.21 KG/M2

## 2021-02-24 DIAGNOSIS — Z3A.39 39 WEEKS GESTATION OF PREGNANCY: Primary | ICD-10-CM

## 2021-02-24 DIAGNOSIS — Z36.89 ENCOUNTER FOR ULTRASOUND TO ASSESS FETAL GROWTH: ICD-10-CM

## 2021-02-24 DIAGNOSIS — Z98.891 HISTORY OF 2 CESAREAN SECTIONS: ICD-10-CM

## 2021-02-24 DIAGNOSIS — O34.219 DESIRES VBAC (VAGINAL BIRTH AFTER CESAREAN) TRIAL: ICD-10-CM

## 2021-02-24 PROBLEM — O32.2XX0 TRANSVERSE PRESENTATION, ANTEPARTUM: Status: ACTIVE | Noted: 2021-02-24

## 2021-02-24 PROBLEM — O36.60X0 MACROSOMIA AFFECTING MANAGEMENT OF MOTHER: Status: ACTIVE | Noted: 2021-02-24

## 2021-02-24 PROCEDURE — 76816 OB US FOLLOW-UP PER FETUS: CPT | Performed by: OBSTETRICS & GYNECOLOGY

## 2021-02-24 PROCEDURE — 99214 OFFICE O/P EST MOD 30 MIN: CPT | Performed by: OBSTETRICS & GYNECOLOGY

## 2021-02-24 NOTE — DISCHARGE INSTRUCTIONS
Pregnancy at 44 to 40 Weeks   AMBULATORY CARE:   What changes are happening to your body: You are now getting close to your due date  Your due date is just an estimate of when your baby will be born  Your baby may be born before or after your due date  Your breathing may be easier if your baby has moved down into a head-down position  You may need to urinate more often because the baby may be pressing on your bladder  You may also feel more discomfort and tire easily  You may also be having trouble sleeping  Seek care immediately if:   · You develop a severe headache that does not go away  · You have new or increased vision changes, such as blurred or spotted vision  · You have new or increased swelling in your face or hands  · You have vaginal spotting or bleeding  · Your water broke or you feel warm water gushing or trickling from your vagina  Contact your healthcare provider if:   · You have more than 5 contractions in 1 hour  · You notice any changes in your baby's movements  · You have abdominal cramps, pressure, or tightening  · You have a change in vaginal discharge  · You have chills or a fever  · You have vaginal itching, burning, or pain  · You have yellow, green, white, or foul-smelling vaginal discharge  · You have pain or burning when you urinate, less urine than usual, or pink or bloody urine  · You have questions or concerns about your condition or care  How to care for yourself at this stage of your pregnancy:   · Eat a variety of healthy foods  Healthy foods include fruits, vegetables, whole-grain breads, low-fat dairy foods, beans, lean meats, and fish  Drink liquids as directed  Ask how much liquid to drink each day and which liquids are best for you  Limit caffeine to less than 200 milligrams each day  Limit your intake of fish to 2 servings each week  Choose fish low in mercury such as canned light tuna, shrimp, salmon, cod, or tilapia   Do not  eat fish high in mercury such as swordfish, tilefish, chante mackerel, and shark  · Take prenatal vitamins as directed  Your need for certain vitamins and minerals, such as folic acid, increases during pregnancy  Prenatal vitamins provide some of the extra vitamins and minerals you need  Prenatal vitamins may also help to decrease the risk of certain birth defects  · Rest as needed  Put your feet up if you have swelling in your ankles and feet  · Do not smoke  Smoking increases your risk of a miscarriage and other health problems during your pregnancy  Smoking can cause your baby to be born early or weigh less at birth  Ask your healthcare provider for information if you need help quitting  · Do not drink alcohol  Alcohol passes from your body to your baby through the placenta  It can affect your baby's brain development and cause fetal alcohol syndrome (FAS)  FAS is a group of conditions that causes mental, behavior, and growth problems  · Talk to your healthcare provider before you take any medicines  Many medicines may harm your baby if you take them when you are pregnant  Do not take any medicines, vitamins, herbs, or supplements without first talking to your healthcare provider  Never use illegal or street drugs (such as marijuana or cocaine) while you are pregnant  · Talk to your healthcare provider before you travel  You may not be able to travel in an airplane after 36 weeks  He may also recommend that you avoid long road trips  Safety tips during pregnancy:   · Avoid hot tubs and saunas  Do not use a hot tub or sauna while you are pregnant, especially during your first trimester  Hot tubs and saunas may raise your baby's temperature and increase the risk of birth defects  · Avoid toxoplasmosis  This is an infection caused by eating raw meat or being around infected cat feces  It can cause birth defects, miscarriages, and other problems   Wash your hands after you touch raw meat  Make sure any meat is well-cooked before you eat it  Avoid raw eggs and unpasteurized milk  Use gloves or ask someone else to clean your cat's litter box while you are pregnant  · Ask your healthcare provider about travel  The most comfortable time to travel is during the second trimester  Ask your healthcare provider if you can travel after 36 weeks  You may not be able to travel in an airplane after 36 weeks  He may also recommend that you avoid long road trips  Changes that are happening with your baby: Your baby is ready to be born  At birth, your baby may weigh about 6 to 9 pounds and be about 19 to 21 inches long  Your baby may be in a head-down position  Your baby will also rest lower in your abdomen  What you need to know about prenatal care: Your healthcare provider will check your blood pressure and weight  You may also need the following:  · A urine test  may also be done to check for sugar and protein  These can be signs of gestational diabetes or infection  Protein in your urine may also be a sign of preeclampsia  Preeclampsia is a condition that can develop during week 20 or later of your pregnancy  It causes high blood pressure, and it can cause problems with your kidneys and other organs  · Your baby's heart rate  will be checked  © Copyright [address] Information is for End User's use only and may not be sold, redistributed or otherwise used for commercial purposes  All illustrations and images included in CareNotes® are the copyrighted property of [employer] Inc  or [facility] [employer]   The above information is an  only  It is not intended as medical advice for individual conditions or treatments  Talk to your doctor, nurse or pharmacist before following any medical regimen to see if it is safe and effective for you

## 2021-02-24 NOTE — LETTER
February 24, 3056     Katlyn SiegelDO  [address]   [address]  [facility]    Patient: Mary Groves   YOB: 1985   Date of Visit: 2/24/2021       Dear Dr Nathan Velázquez:    Thank you for referring Mary Groves to me for evaluation  Below are my notes for this consultation  If you have questions, please do not hesitate to call me  I look forward to following your patient along with you  Sincerely,        Mani Serrano MD        CC: No Recipients  Mani Serrano MD  2/24/2021  3:48 PM  Sign when Signing Visit  [address]: Ms Ori Parikh was seen today at 39w2d for fetal growth assessment ultrasound  See ultrasound report under "OB Procedures" tab    Please don't hesitate to contact our office with any concerns or questions   -Mani Serrano MD

## 2021-02-24 NOTE — PROGRESS NOTES
Triage Note - OB  Diaz Fraga 38 y o  female MRN: [id number]  Unit/Bed#:  TRIAGE  Encounter: [id number]    OB TRIAGE NOTE  Yu Owen  [id number]  2021  9:19 PM  LD TRIAGE 5/LD TRIAGE 5-*    Assessment/Plan:  38 y o  [id number] 39w1d with hx of  section x 2 and polyhydramnios; prolonged monitoring today showed reactive, Cat I FHT  Follow up ultrasound at [facility]  to be scheduled by patient for either  or   Induction of labor to be scheduled for 21  Discharge instructions  Patient instructed to call if experiencing worsening contractions, vaginal bleeding, loss of fluid or decreased fetal movement  Plan of care discussed with Dr Marilin Vasquez   ______________    SUBJECTIVE    SENA: Estimated Date of Delivery: 3/1/21    HPI Chronology:  38 y o  [id number] 39w1d presents for extended fetal monitoring after having equivocal NST at routine prenatal appointment earlier today  Patient has a hx of 2 prior  sections and desires TOLAC in this pregnancy  Contractions: yes  Leakage: no  Bleeding: no  Fetal Movement: present  Pelvic pain: yes    Vitals:   /68 (BP Location: Right arm)   Pulse 100   Temp 98 4 °F (36 9 °C) (Oral)   Resp 16   Ht 5' 4" (1 626 m)   Wt 77 1 kg (170 lb)   LMP 2020 (Exact Date)   BMI 29 18 kg/m²   Body mass index is 29 18 kg/m²  Review of Systems  As above    Physical Exam  Constitutional:       Appearance: Normal appearance  Abdominal:      Palpations: Abdomen is soft  Tenderness: There is no abdominal tenderness  Musculoskeletal: Normal range of motion  Skin:     General: Skin is warm and dry  Neurological:      General: No focal deficit present  Mental Status: She is alert     Psychiatric:         Mood and Affect: Mood normal          Behavior: Behavior normal        FHT:  Baseline Rate: 130 bpm  Variability: Moderate 6-25 bpm  Accelerations: Episodic, 15 x 15 or greater, At variable times  Decelerations: None  FHR Category: Category I  TOCO:   Contraction Frequency (minutes): 2-4"  Contraction Quality: Mild    Labs: No results found for this or any previous visit (from the past 24 hour(s))  Lab, Imaging and other studies: I have personally reviewed pertinent reports          Joe White MD  2/23/2021  9:19 PM

## 2021-02-24 NOTE — PROGRESS NOTES
[facility] Dian Terrazas was seen today at 39w2d for fetal growth assessment ultrasound  See ultrasound report under "OB Procedures" tab    Please don't hesitate to contact our office with any concerns or questions   -Luann Lozano MD

## 2021-02-25 NOTE — PROGRESS NOTES
Called to speak to patient regarding US results last night  Baby is 9#2, AC > 95%, transverse  Patient would like to present for IOL tomorrow and if still transverse, will agree to R LTCS tomorrow  All questions answered  Reviewed steps of   Patient scheduled for 730am tomorrow

## 2021-02-26 ENCOUNTER — ANESTHESIA EVENT (INPATIENT)
Dept: LABOR AND DELIVERY | Facility: HOSPITAL | Age: 36
End: 2021-02-26
Payer: COMMERCIAL

## 2021-02-26 ENCOUNTER — HOSPITAL ENCOUNTER (INPATIENT)
Facility: HOSPITAL | Age: 36
LOS: 4 days | Discharge: HOME | End: 2021-03-02
Attending: OBSTETRICS & GYNECOLOGY | Admitting: OBSTETRICS & GYNECOLOGY
Payer: COMMERCIAL

## 2021-02-26 ENCOUNTER — ANESTHESIA (INPATIENT)
Dept: LABOR AND DELIVERY | Facility: HOSPITAL | Age: 36
End: 2021-02-26
Payer: COMMERCIAL

## 2021-02-26 ENCOUNTER — HOSPITAL ENCOUNTER (OUTPATIENT)
Dept: LABOR AND DELIVERY | Facility: HOSPITAL | Age: 36
Discharge: HOME | End: 2021-02-26
Payer: COMMERCIAL

## 2021-02-26 DIAGNOSIS — Z3A.39 39 WEEKS GESTATION OF PREGNANCY: Primary | ICD-10-CM

## 2021-02-26 DIAGNOSIS — Z98.891 S/P CESAREAN SECTION: ICD-10-CM

## 2021-02-26 DIAGNOSIS — O34.219 PREVIOUS CESAREAN SECTION COMPLICATING PREGNANCY: ICD-10-CM

## 2021-02-26 LAB
ABO GROUP BLD: NORMAL
BASE EXCESS BLDCOA CALC-SCNC: -9.9 MMOL/L (ref 3–11)
BASE EXCESS BLDCOV CALC-SCNC: -7.1 MMOL/L (ref 1–9)
BASOPHILS # BLD AUTO: 0.12 THOUSANDS/ΜL (ref 0–0.1)
BASOPHILS NFR BLD AUTO: 1 % (ref 0–1)
BLD GP AB SCN SERPL QL: NEGATIVE
EOSINOPHIL # BLD AUTO: 0.11 THOUSAND/ΜL (ref 0–0.61)
EOSINOPHIL NFR BLD AUTO: 1 % (ref 0–6)
ERYTHROCYTE [DISTWIDTH] IN BLOOD BY AUTOMATED COUNT: 15.4 % (ref 11.6–15.1)
HCO3 BLDCOA-SCNC: 20.1 MMOL/L (ref 17.3–27.3)
HCO3 BLDCOV-SCNC: 20.1 MMOL/L (ref 12.2–28.6)
HCT VFR BLD AUTO: 33.7 % (ref 34.8–46.1)
HGB BLD-MCNC: 11 G/DL (ref 11.5–15.4)
IMM GRANULOCYTES # BLD AUTO: 0.31 THOUSAND/UL (ref 0–0.2)
IMM GRANULOCYTES NFR BLD AUTO: 2 % (ref 0–2)
LYMPHOCYTES # BLD AUTO: 2.7 THOUSANDS/ΜL (ref 0.6–4.47)
LYMPHOCYTES NFR BLD AUTO: 20 % (ref 14–44)
MCH RBC QN AUTO: 29.3 PG (ref 26.8–34.3)
MCHC RBC AUTO-ENTMCNC: 32.6 G/DL (ref 31.4–37.4)
MCV RBC AUTO: 90 FL (ref 82–98)
MONOCYTES # BLD AUTO: 1.49 THOUSAND/ΜL (ref 0.17–1.22)
MONOCYTES NFR BLD AUTO: 11 % (ref 4–12)
NEUTROPHILS # BLD AUTO: 8.99 THOUSANDS/ΜL (ref 1.85–7.62)
NEUTS SEG NFR BLD AUTO: 65 % (ref 43–75)
NRBC BLD AUTO-RTO: 0 /100 WBCS
O2 CT VFR BLDCOA CALC: 6.9 ML/DL
OXYHGB MFR BLDCOA: 30 %
OXYHGB MFR BLDCOV: 36.2 %
PCO2 BLDCOA: 60.4 MM[HG] (ref 30–60)
PCO2 BLDCOV: 46.2 MM HG (ref 27–43)
PH BLDCOA: 7.14 [PH] (ref 7.23–7.43)
PH BLDCOV: 7.26 [PH] (ref 7.19–7.49)
PLATELET # BLD AUTO: 263 THOUSANDS/UL (ref 149–390)
PMV BLD AUTO: 10.9 FL (ref 8.9–12.7)
PO2 BLDCOA: 18.4 MM HG (ref 5–25)
PO2 BLDCOV: 18.4 MM HG (ref 15–45)
RBC # BLD AUTO: 3.76 MILLION/UL (ref 3.81–5.12)
RH BLD: POSITIVE
SAO2 % BLDCOV: 8.2 ML/DL
SPECIMEN EXPIRATION DATE: NORMAL
WBC # BLD AUTO: 13.72 THOUSAND/UL (ref 4.31–10.16)

## 2021-02-26 PROCEDURE — NC001 PR NO CHARGE: Performed by: OBSTETRICS & GYNECOLOGY

## 2021-02-26 PROCEDURE — 86901 BLOOD TYPING SEROLOGIC RH(D): CPT | Performed by: STUDENT IN AN ORGANIZED HEALTH CARE EDUCATION/TRAINING PROGRAM

## 2021-02-26 PROCEDURE — 4A1HXCZ MONITORING OF PRODUCTS OF CONCEPTION, CARDIAC RATE, EXTERNAL APPROACH: ICD-10-PCS | Performed by: OBSTETRICS & GYNECOLOGY

## 2021-02-26 PROCEDURE — 86592 SYPHILIS TEST NON-TREP QUAL: CPT | Performed by: STUDENT IN AN ORGANIZED HEALTH CARE EDUCATION/TRAINING PROGRAM

## 2021-02-26 PROCEDURE — 86900 BLOOD TYPING SEROLOGIC ABO: CPT | Performed by: STUDENT IN AN ORGANIZED HEALTH CARE EDUCATION/TRAINING PROGRAM

## 2021-02-26 PROCEDURE — 86850 RBC ANTIBODY SCREEN: CPT | Performed by: STUDENT IN AN ORGANIZED HEALTH CARE EDUCATION/TRAINING PROGRAM

## 2021-02-26 PROCEDURE — 59510 CESAREAN DELIVERY: CPT | Performed by: OBSTETRICS & GYNECOLOGY

## 2021-02-26 PROCEDURE — 85025 COMPLETE CBC W/AUTO DIFF WBC: CPT | Performed by: STUDENT IN AN ORGANIZED HEALTH CARE EDUCATION/TRAINING PROGRAM

## 2021-02-26 PROCEDURE — 82805 BLOOD GASES W/O2 SATURATION: CPT | Performed by: OBSTETRICS & GYNECOLOGY

## 2021-02-26 RX ORDER — ONDANSETRON 2 MG/ML
4 INJECTION INTRAMUSCULAR; INTRAVENOUS EVERY 8 HOURS PRN
Status: DISCONTINUED | OUTPATIENT
Start: 2021-02-26 | End: 2021-03-02 | Stop reason: HOSPADM

## 2021-02-26 RX ORDER — ONDANSETRON 2 MG/ML
4 INJECTION INTRAMUSCULAR; INTRAVENOUS EVERY 8 HOURS PRN
Status: DISCONTINUED | OUTPATIENT
Start: 2021-02-26 | End: 2021-02-26

## 2021-02-26 RX ORDER — SIMETHICONE 80 MG
80 TABLET,CHEWABLE ORAL 4 TIMES DAILY PRN
Status: DISCONTINUED | OUTPATIENT
Start: 2021-02-26 | End: 2021-03-02 | Stop reason: HOSPADM

## 2021-02-26 RX ORDER — FENTANYL CITRATE 50 UG/ML
INJECTION, SOLUTION INTRAMUSCULAR; INTRAVENOUS AS NEEDED
Status: DISCONTINUED | OUTPATIENT
Start: 2021-02-26 | End: 2021-02-26

## 2021-02-26 RX ORDER — NALOXONE HYDROCHLORIDE 0.4 MG/ML
0.1 INJECTION, SOLUTION INTRAMUSCULAR; INTRAVENOUS; SUBCUTANEOUS
Status: ACTIVE | OUTPATIENT
Start: 2021-02-26 | End: 2021-02-27

## 2021-02-26 RX ORDER — DEXAMETHASONE SODIUM PHOSPHATE 4 MG/ML
8 INJECTION, SOLUTION INTRA-ARTICULAR; INTRALESIONAL; INTRAMUSCULAR; INTRAVENOUS; SOFT TISSUE ONCE AS NEEDED
Status: ACTIVE | OUTPATIENT
Start: 2021-02-26 | End: 2021-02-27

## 2021-02-26 RX ORDER — HYDROMORPHONE HCL/PF 1 MG/ML
1 SYRINGE (ML) INJECTION EVERY 2 HOUR PRN
Status: DISCONTINUED | OUTPATIENT
Start: 2021-02-26 | End: 2021-03-02 | Stop reason: HOSPADM

## 2021-02-26 RX ORDER — CEFAZOLIN SODIUM 1 G/50ML
1000 SOLUTION INTRAVENOUS ONCE
Status: COMPLETED | OUTPATIENT
Start: 2021-02-26 | End: 2021-02-26

## 2021-02-26 RX ORDER — HYDROMORPHONE HCL/PF 1 MG/ML
0.2 SYRINGE (ML) INJECTION
Status: DISCONTINUED | OUTPATIENT
Start: 2021-02-26 | End: 2021-03-02 | Stop reason: HOSPADM

## 2021-02-26 RX ORDER — ONDANSETRON 2 MG/ML
INJECTION INTRAMUSCULAR; INTRAVENOUS AS NEEDED
Status: DISCONTINUED | OUTPATIENT
Start: 2021-02-26 | End: 2021-02-26

## 2021-02-26 RX ORDER — OXYCODONE HYDROCHLORIDE 5 MG/1
5 TABLET ORAL EVERY 4 HOURS PRN
Status: DISCONTINUED | OUTPATIENT
Start: 2021-02-27 | End: 2021-03-02 | Stop reason: HOSPADM

## 2021-02-26 RX ORDER — METOCLOPRAMIDE HYDROCHLORIDE 5 MG/ML
5 INJECTION INTRAMUSCULAR; INTRAVENOUS EVERY 6 HOURS PRN
Status: ACTIVE | OUTPATIENT
Start: 2021-02-26 | End: 2021-02-27

## 2021-02-26 RX ORDER — DIAPER,BRIEF,INFANT-TODD,DISP
1 EACH MISCELLANEOUS DAILY PRN
Status: DISCONTINUED | OUTPATIENT
Start: 2021-02-26 | End: 2021-03-02 | Stop reason: HOSPADM

## 2021-02-26 RX ORDER — OXYTOCIN/RINGER'S LACTATE 30/500 ML
PLASTIC BAG, INJECTION (ML) INTRAVENOUS CONTINUOUS PRN
Status: DISCONTINUED | OUTPATIENT
Start: 2021-02-26 | End: 2021-02-26

## 2021-02-26 RX ORDER — OXYTOCIN/RINGER'S LACTATE 30/500 ML
250 PLASTIC BAG, INJECTION (ML) INTRAVENOUS CONTINUOUS
Status: DISCONTINUED | OUTPATIENT
Start: 2021-02-26 | End: 2021-02-26

## 2021-02-26 RX ORDER — ACETAMINOPHEN 325 MG/1
650 TABLET ORAL EVERY 6 HOURS
Status: DISCONTINUED | OUTPATIENT
Start: 2021-02-26 | End: 2021-03-02 | Stop reason: HOSPADM

## 2021-02-26 RX ORDER — ONDANSETRON 2 MG/ML
4 INJECTION INTRAMUSCULAR; INTRAVENOUS EVERY 4 HOURS PRN
Status: DISCONTINUED | OUTPATIENT
Start: 2021-02-26 | End: 2021-02-26

## 2021-02-26 RX ORDER — OXYTOCIN/RINGER'S LACTATE 30/500 ML
62.5 PLASTIC BAG, INJECTION (ML) INTRAVENOUS CONTINUOUS
Status: ACTIVE | OUTPATIENT
Start: 2021-02-26 | End: 2021-02-27

## 2021-02-26 RX ORDER — ACETAMINOPHEN 325 MG/1
650 TABLET ORAL EVERY 6 HOURS PRN
Status: DISCONTINUED | OUTPATIENT
Start: 2021-02-26 | End: 2021-03-02 | Stop reason: HOSPADM

## 2021-02-26 RX ORDER — SODIUM CHLORIDE, SODIUM LACTATE, POTASSIUM CHLORIDE, CALCIUM CHLORIDE 600; 310; 30; 20 MG/100ML; MG/100ML; MG/100ML; MG/100ML
125 INJECTION, SOLUTION INTRAVENOUS CONTINUOUS
Status: DISCONTINUED | OUTPATIENT
Start: 2021-02-26 | End: 2021-02-26

## 2021-02-26 RX ORDER — IBUPROFEN 600 MG/1
600 TABLET ORAL EVERY 6 HOURS PRN
Status: DISCONTINUED | OUTPATIENT
Start: 2021-02-28 | End: 2021-02-27

## 2021-02-26 RX ORDER — HYDROMORPHONE HCL/PF 1 MG/ML
0.5 SYRINGE (ML) INJECTION EVERY 2 HOUR PRN
Status: DISCONTINUED | OUTPATIENT
Start: 2021-02-26 | End: 2021-03-02 | Stop reason: HOSPADM

## 2021-02-26 RX ORDER — SODIUM CHLORIDE, SODIUM LACTATE, POTASSIUM CHLORIDE, CALCIUM CHLORIDE 600; 310; 30; 20 MG/100ML; MG/100ML; MG/100ML; MG/100ML
125 INJECTION, SOLUTION INTRAVENOUS CONTINUOUS
Status: DISCONTINUED | OUTPATIENT
Start: 2021-02-26 | End: 2021-03-02 | Stop reason: HOSPADM

## 2021-02-26 RX ORDER — DEXAMETHASONE SODIUM PHOSPHATE 4 MG/ML
INJECTION, SOLUTION INTRA-ARTICULAR; INTRALESIONAL; INTRAMUSCULAR; INTRAVENOUS; SOFT TISSUE AS NEEDED
Status: DISCONTINUED | OUTPATIENT
Start: 2021-02-26 | End: 2021-02-26

## 2021-02-26 RX ORDER — FENTANYL CITRATE/PF 50 MCG/ML
25 SYRINGE (ML) INJECTION
Status: DISCONTINUED | OUTPATIENT
Start: 2021-02-26 | End: 2021-03-02 | Stop reason: HOSPADM

## 2021-02-26 RX ORDER — KETOROLAC TROMETHAMINE 30 MG/ML
INJECTION, SOLUTION INTRAMUSCULAR; INTRAVENOUS AS NEEDED
Status: DISCONTINUED | OUTPATIENT
Start: 2021-02-26 | End: 2021-02-26

## 2021-02-26 RX ORDER — BUPIVACAINE HYDROCHLORIDE 7.5 MG/ML
INJECTION, SOLUTION INTRASPINAL AS NEEDED
Status: DISCONTINUED | OUTPATIENT
Start: 2021-02-26 | End: 2021-02-26

## 2021-02-26 RX ORDER — ONDANSETRON 2 MG/ML
4 INJECTION INTRAMUSCULAR; INTRAVENOUS ONCE AS NEEDED
Status: DISCONTINUED | OUTPATIENT
Start: 2021-02-26 | End: 2021-03-02 | Stop reason: HOSPADM

## 2021-02-26 RX ORDER — OXYCODONE HYDROCHLORIDE 10 MG/1
10 TABLET ORAL EVERY 4 HOURS PRN
Status: DISCONTINUED | OUTPATIENT
Start: 2021-02-27 | End: 2021-03-02 | Stop reason: HOSPADM

## 2021-02-26 RX ORDER — KETOROLAC TROMETHAMINE 30 MG/ML
30 INJECTION, SOLUTION INTRAMUSCULAR; INTRAVENOUS EVERY 6 HOURS SCHEDULED
Status: DISPENSED | OUTPATIENT
Start: 2021-02-27 | End: 2021-02-27

## 2021-02-26 RX ORDER — DIPHENHYDRAMINE HCL 25 MG
25 TABLET ORAL EVERY 6 HOURS PRN
Status: DISCONTINUED | OUTPATIENT
Start: 2021-02-26 | End: 2021-03-02 | Stop reason: HOSPADM

## 2021-02-26 RX ORDER — DOCUSATE SODIUM 100 MG/1
100 CAPSULE, LIQUID FILLED ORAL 2 TIMES DAILY
Status: DISCONTINUED | OUTPATIENT
Start: 2021-02-26 | End: 2021-03-02 | Stop reason: HOSPADM

## 2021-02-26 RX ORDER — CALCIUM CARBONATE 200(500)MG
1000 TABLET,CHEWABLE ORAL DAILY PRN
Status: DISCONTINUED | OUTPATIENT
Start: 2021-02-26 | End: 2021-03-02 | Stop reason: HOSPADM

## 2021-02-26 RX ORDER — METOCLOPRAMIDE HYDROCHLORIDE 5 MG/ML
INJECTION INTRAMUSCULAR; INTRAVENOUS AS NEEDED
Status: DISCONTINUED | OUTPATIENT
Start: 2021-02-26 | End: 2021-02-26

## 2021-02-26 RX ADMIN — CEFAZOLIN SODIUM 1000 MG: 1 SOLUTION INTRAVENOUS at 14:20

## 2021-02-26 RX ADMIN — FENTANYL CITRATE 25 MCG: 50 INJECTION, SOLUTION INTRAMUSCULAR; INTRAVENOUS at 15:37

## 2021-02-26 RX ADMIN — ONDANSETRON 4 MG: 2 INJECTION INTRAMUSCULAR; INTRAVENOUS at 14:22

## 2021-02-26 RX ADMIN — PHENYLEPHRINE HYDROCHLORIDE 200 MCG: 10 INJECTION INTRAVENOUS at 15:06

## 2021-02-26 RX ADMIN — Medication 62.5 MILLI-UNITS/MIN: at 16:18

## 2021-02-26 RX ADMIN — KETOROLAC TROMETHAMINE 30 MG: 30 INJECTION, SOLUTION INTRAMUSCULAR at 15:31

## 2021-02-26 RX ADMIN — FENTANYL CITRATE 25 MCG: 50 INJECTION, SOLUTION INTRAMUSCULAR; INTRAVENOUS at 15:39

## 2021-02-26 RX ADMIN — SODIUM CHLORIDE, SODIUM LACTATE, POTASSIUM CHLORIDE, AND CALCIUM CHLORIDE: .6; .31; .03; .02 INJECTION, SOLUTION INTRAVENOUS at 14:55

## 2021-02-26 RX ADMIN — SODIUM CHLORIDE, SODIUM LACTATE, POTASSIUM CHLORIDE, AND CALCIUM CHLORIDE 125 ML/HR: .6; .31; .03; .02 INJECTION, SOLUTION INTRAVENOUS at 11:09

## 2021-02-26 RX ADMIN — PHENYLEPHRINE HYDROCHLORIDE 200 MCG: 10 INJECTION INTRAVENOUS at 14:29

## 2021-02-26 RX ADMIN — BUPIVACAINE HYDROCHLORIDE IN DEXTROSE 1.8 ML: 7.5 INJECTION, SOLUTION SUBARACHNOID at 14:17

## 2021-02-26 RX ADMIN — HYDROMORPHONE HYDROCHLORIDE 1 MG: 1 INJECTION, SOLUTION INTRAMUSCULAR; INTRAVENOUS; SUBCUTANEOUS at 19:26

## 2021-02-26 RX ADMIN — DEXAMETHASONE SODIUM PHOSPHATE 4 MG: 4 INJECTION INTRA-ARTICULAR; INTRALESIONAL; INTRAMUSCULAR; INTRAVENOUS; SOFT TISSUE at 14:58

## 2021-02-26 RX ADMIN — SODIUM CHLORIDE, SODIUM LACTATE, POTASSIUM CHLORIDE, AND CALCIUM CHLORIDE 125 ML/HR: .6; .31; .03; .02 INJECTION, SOLUTION INTRAVENOUS at 17:03

## 2021-02-26 RX ADMIN — SODIUM CHLORIDE, SODIUM LACTATE, POTASSIUM CHLORIDE, AND CALCIUM CHLORIDE 125 ML/HR: .6; .31; .03; .02 INJECTION, SOLUTION INTRAVENOUS at 12:17

## 2021-02-26 RX ADMIN — FENTANYL CITRATE 25 MCG: 50 INJECTION, SOLUTION INTRAMUSCULAR; INTRAVENOUS at 15:18

## 2021-02-26 RX ADMIN — PHENYLEPHRINE HYDROCHLORIDE 200 MCG: 10 INJECTION INTRAVENOUS at 15:11

## 2021-02-26 RX ADMIN — ACETAMINOPHEN 650 MG: 325 TABLET, FILM COATED ORAL at 20:50

## 2021-02-26 RX ADMIN — METOCLOPRAMIDE HYDROCHLORIDE 10 MG: 5 INJECTION INTRAMUSCULAR; INTRAVENOUS at 14:19

## 2021-02-26 RX ADMIN — PHENYLEPHRINE HYDROCHLORIDE 200 MCG: 10 INJECTION INTRAVENOUS at 15:01

## 2021-02-26 RX ADMIN — Medication 250 MILLI-UNITS/MIN: at 14:53

## 2021-02-26 RX ADMIN — SODIUM CHLORIDE, SODIUM LACTATE, POTASSIUM CHLORIDE, AND CALCIUM CHLORIDE: .6; .31; .03; .02 INJECTION, SOLUTION INTRAVENOUS at 14:04

## 2021-02-26 RX ADMIN — PHENYLEPHRINE HYDROCHLORIDE 100 MCG/MIN: 10 INJECTION INTRAVENOUS at 14:17

## 2021-02-26 RX ADMIN — DOCUSATE SODIUM 100 MG: 100 CAPSULE, LIQUID FILLED ORAL at 19:25

## 2021-02-26 RX ADMIN — PHENYLEPHRINE HYDROCHLORIDE 300 MCG: 10 INJECTION INTRAVENOUS at 14:59

## 2021-02-26 RX ADMIN — FENTANYL CITRATE 25 MCG: 50 INJECTION, SOLUTION INTRAMUSCULAR; INTRAVENOUS at 15:24

## 2021-02-26 NOTE — ANESTHESIA PREPROCEDURE EVALUATION
Procedure:   SECTION () REPEAT (N/A Uterus)    RLTCS x 3    Denies the following: CP/SOB with exertion, asthma, COPD, ASHLEIGH, stroke/TIA, seizure      Relevant Problems   GYN   (+) 39 weeks gestation of pregnancy   (+) Elderly multigravida, third trimester   (+) Multigravida of advanced maternal age in third trimester   (+) Pregnancy with history of  section, antepartum      MUSCULOSKELETAL   (+) Acute midline thoracic back pain      NEURO/PSYCH   (+) Depression with anxiety        Physical Exam    Airway    Mallampati score: I  TM Distance: >3 FB  Neck ROM: full     Dental   No notable dental hx     Cardiovascular      Pulmonary      Other Findings        Anesthesia Plan  ASA Score- 2     Anesthesia Type- epidural and spinal with ASA Monitors  Additional Monitors:   Airway Plan:           Plan Factors-Exercise tolerance (METS): >4 METS  Chart reviewed  Existing labs reviewed  Patient summary reviewed  Patient is not a current smoker  Induction-     Postoperative Plan-     Informed Consent- Anesthetic plan and risks discussed with patient  I personally reviewed this patient with the CRNA  Discussed and agreed on the Anesthesia Plan with the CRNA  Rose Mack

## 2021-02-26 NOTE — ANESTHESIA PROCEDURE NOTES
Spinal Block    Patient location during procedure: OB  Start time: 2/26/2021 2:17 PM  Reason for block: procedure for pain and primary anesthetic  Staffing  Anesthesiologist: Kervin Feng MD  Resident/CRNA: Dandy Gary CRNA  Performed: resident/CRNA and anesthesiologist   Preanesthetic Checklist  Completed: patient identified, site marked, surgical consent, pre-op evaluation, timeout performed, IV checked, risks and benefits discussed and monitors and equipment checked  Spinal Block  Patient position: sitting  Prep: Betadine  Patient monitoring: heart rate, cardiac monitor, continuous pulse ox and frequent blood pressure checks  Approach: midline  Location: L4-5  Injection technique: single-shot  Needle  Needle type: pencil-tip   Needle gauge: 25 G  Needle length: 10 cm  Assessment  Sensory level: T4  Events: cerebrospinal fluid  Injection Assessment:  positive aspiration for clear CSF and no paresthesia on injection    Additional Notes  Attempted CSE however epidural catheter was intravascular and removed

## 2021-02-26 NOTE — PLAN OF CARE
Problem: PAIN - ADULT  Goal: Verbalizes/displays adequate comfort level or baseline comfort level  Description: Interventions:  - Encourage patient to monitor pain and request assistance  - Assess pain using appropriate pain scale  - Administer analgesics based on type and severity of pain and evaluate response  - Implement non-pharmacological measures as appropriate and evaluate response  - Consider cultural and social influences on pain and pain management  - Notify physician/advanced practitioner if interventions unsuccessful or patient reports new pain  Outcome: Progressing     Problem: INFECTION - ADULT  Goal: Absence or prevention of progression during hospitalization  Description: INTERVENTIONS:  - Assess and monitor for signs and symptoms of infection  - Monitor lab/diagnostic results  - Monitor all insertion sites, i e  indwelling lines, tubes, and drains  - Monitor endotracheal if appropriate and nasal secretions for changes in amount and color  - [place] appropriate cooling/warming therapies per order  - Administer medications as ordered  - Instruct and encourage patient and family to use good hand hygiene technique  - Identify and instruct in appropriate isolation precautions for identified infection/condition  Outcome: Progressing  Goal: Absence of fever/infection during neutropenic period  Description: INTERVENTIONS:  - Monitor WBC    Outcome: Progressing     Problem: SAFETY ADULT  Goal: Patient will remain free of falls  Description: INTERVENTIONS:  - Assess patient frequently for physical needs  -  Identify cognitive and physical deficits and behaviors that affect risk of falls    -  [place] fall precautions as indicated by assessment   - Educate patient/family on patient safety including physical limitations  - Instruct patient to call for assistance with activity based on assessment  - Modify environment to reduce risk of injury  - Consider OT/PT consult to assist with strengthening/mobility  Outcome: Progressing  Goal: Maintain or return to baseline ADL function  Description: INTERVENTIONS:  -  Assess patient's ability to carry out ADLs; assess patient's baseline for ADL function and identify physical deficits which impact ability to perform ADLs (bathing, care of mouth/teeth, toileting, grooming, dressing, etc )  - Assess/evaluate cause of self-care deficits   - Assess range of motion  - Assess patient's mobility; develop plan if impaired  - Assess patient's need for assistive devices and provide as appropriate  - Encourage maximum independence but intervene and supervise when necessary  - Involve family in performance of ADLs  - Assess for home care needs following discharge   - Consider OT consult to assist with ADL evaluation and planning for discharge  - Provide patient education as appropriate  Outcome: Progressing  Goal: Maintain or return mobility status to optimal level  Description: INTERVENTIONS:  - Assess patient's baseline mobility status (ambulation, transfers, stairs, etc )    - Identify cognitive and physical deficits and behaviors that affect mobility  - Identify mobility aids required to assist with transfers and/or ambulation (gait belt, sit-to-stand, lift, walker, cane, etc )  - [place] fall precautions as indicated by assessment  - Record patient progress and toleration of activity level on Mobility SBAR; progress patient to next Phase/Stage  - Instruct patient to call for assistance with activity based on assessment  - Consider rehabilitation consult to assist with strengthening/weightbearing, etc   Outcome: Progressing     Problem: DISCHARGE PLANNING  Goal: Discharge to home or other facility with appropriate resources  Description: INTERVENTIONS:  - Identify barriers to discharge w/patient and caregiver  - Arrange for needed discharge resources and transportation as appropriate  - Identify discharge learning needs (meds, wound care, etc )  - Arrange for interpretive services to assist at discharge as needed  - Refer to Case Management Department for coordinating discharge planning if the patient needs post-hospital services based on physician/advanced practitioner order or complex needs related to functional status, cognitive ability, or social support system  Outcome: Progressing     Problem: POSTPARTUM  Goal: Experiences normal postpartum course  Description: INTERVENTIONS:  - Monitor maternal vital signs  - Assess uterine involution and lochia  Outcome: Progressing  Goal: Appropriate maternal -  bonding  Description: INTERVENTIONS:  - Identify family support  - Assess for appropriate maternal/infant bonding   -Encourage maternal/infant bonding opportunities  - Referral to  or  as needed  Outcome: Progressing  Goal: Establishment of infant feeding pattern  Description: INTERVENTIONS:  - Assess breast/bottle feeding  - Refer to lactation as needed  Outcome: Progressing  Goal: Incision(s), wounds(s) or drain site(s) healing without S/S of infection  Description: INTERVENTIONS  - Assess and document risk factors for skin impairment   - Assess and document dressing, incision, wound bed, drain sites and surrounding tissue  - Consider nutrition services referral as needed  - Oral mucous membranes remain intact  - Provide patient/ family education  Outcome: Progressing     Problem: Knowledge Deficit  Goal: Verbalizes understanding of labor plan  Description: Assess patient/family/caregiver's baseline knowledge level and ability to understand information  Provide education via patient/family/caregiver's preferred learning method at appropriate level of understanding  1  Provide teaching at level of understanding  2  Provide teaching via preferred learning method(s)    Outcome: Completed  Goal: Patient/family/caregiver demonstrates understanding of disease process, treatment plan, medications, and discharge instructions  Description: Complete learning assessment and assess knowledge base  Interventions:  - Provide teaching at level of understanding  - Provide teaching via preferred learning methods  Outcome: Completed     Problem: Labor & Delivery  Goal: Manages discomfort  Description: Assess and monitor for signs and symptoms of discomfort  Assess patient's pain level regularly and per hospital policy  Administer medications as ordered  Support use of nonpharmacological methods to help control pain such as distraction, imagery, relaxation, and application of heat and cold  Collaborate with interdisciplinary team and patient to determine appropriate pain management plan  1  Include patient in decisions related to comfort  2  Offer non-pharmacological pain management interventions  3  Report ineffective pain management to physician  Outcome: Completed  Goal: Patient vital signs are stable  Description: 1  Assess vital signs - vaginal delivery    Outcome: Completed

## 2021-02-26 NOTE — DISCHARGE SUMMARY
Discharge Summary - Fran Fraga 38 y o  female MRN: [id number]    Unit/Bed#: -01 Encounter: [id number]    Admission Date: 2021     Discharge Date: 3/2/21    Admitting Attending: Dr Anthony Lozada  Delivering Attending: Dr Anthony Lozada  Discharge Attending: Dr Laura Soliz    Diagnosis:  1  History of two prior  sections  2  Advanced maternal age  1  Polyhydramnios, 32 8 cm    Procedures: repeat low transverse  section     Complications: none apparent     Pt is a 37 yo  who was admitted for a repeat  section at 39w4d  She underwent an uncomplicated  delivery  She delivered a viable female  at 65 on 21  Weight was 9lbs 5oz (4224g) with APGARs of 9 and 9 at 1 and 5 minutes  Her preoperative Hb was 11 0  Her postoperative Hb was 8 4  Her postoperative course was uncomplicated  Condition at discharge: good     On day of discharge pain was well controlled, patient was tolerating PO, passing flatus  She was discharged with standard post partum/ post operative instructions to follow up with her physician in 1 week for an incision check and in 3-6 weeks for a postpartum appointment  Discharge instructions/Information to patient and family:   -Do not place anything (no partner, tampons or douche) in your vagina for 6 weeks  -You may walk for exercise for the first 6 weeks then gradually return to your usual activities    -Please do not drive for 1 week if you have no stitches and for 2 weeks if you have stitches or underwent a  delivery     -You may take baths or shower per your preference    -Please look at your bust (breasts) in the mirror daily and call for redness or tenderness or increased warmth    -Please call us for temperature > 100 4*F or 38* C, worsening pain or a foul discharge       Discharge Medications:   Prenatal vitamin daily for 6 months or the duration of nursing whichever is longer    Motrin 600 mg orally every 6 hours as needed for pain  Tylenol (over the counter) per bottle directions as needed for pain: do NOT use with percocet  Hydrocortisone cream 1% (over the counter) applied 1-2x daily to hemorrhoids as needed  Percocet as needed    Provisions for Follow-Up Care: Follow up with your doctor in 1 week for incision site check       Planned Readmission: no    Mary Neves MD  OBGYN PGY-1  6:35 AM  3/2/2021

## 2021-02-26 NOTE — OP NOTE
OPERATIVE REPORT  PATIENT NAME: Wanda Brand    :  1985  MRN: [id number]  Pt Location: [facility] OR ROOM 02    SURGERY DATE: 2021    Surgeon(s) and Role:     * Maira Mayorga DO - Primary     * Nikhil Walker MD - Assisting  Alfredo Hess - Assisting    Preop Diagnosis:  Previous  section complicating pregnancy [Y68 335]    Post-Op Diagnosis Codes:     * Previous  section complicating pregnancy [U72 362]    Procedure(s) (LRB):   SECTION () REPEAT (N/A)    Specimen(s):  ID Type Source Tests Collected by Time Destination   A :  Cord Blood Cord BLOOD GAS, VENOUS, CORD, BLOOD GAS, ARTERIAL, CORD Maira Mayorga DO 2021 1454    B :  Tissue (Placenta on Hold) OB Only Placenta PLACENTA IN STORAGE Maira Mayorga DO 2021 1455        QBL 1007 cc    Drains:  Urethral Catheter Non-latex 16 Fr  (Active)   Site Assessment Clean;Skin intact 21 1545   Collection Container Standard drainage bag 21 1545   Number of days: 0       Anesthesia Type:   Spinal    Operative Indications:  Previous  section complicating pregnancy [F16 171]    Complications:   None    Procedure and Technique:  Operative Findings:  1  Viable female  at 64 with APGARs of 9 and 9 at 1 and 5 minutes  Fetus weighted 9lb 5oz  2  Normal intact placenta with centrally inserted 3VC expressed at 1455    3  Extensive adhesions between uterus and anterior abdominal wall; normal bilateral tubes and ovaries  4  Blood gases:   Arterial pH: 7 139   Arterial base excess: -9 9   Venous pH: 7 256   Venous base excess: -7 1    The patient was taken to the operating room  Spinal anesthesia was adequately established and 1g Ancef was given for preoperative prophylaxis  The patient was then placed in the dorsal supine position with a left tilt of the hips   The patient was then prepped with chlorhexidine for vaginal prep and chloraprep for abdominal prep and draped in the usual sterile fashion for a Pfannenstiel skin incision  A time out was performed to confirm correct patient and correct procedure  An incision was made in the skin with a surgical scalpel and sharp dissection was carried out over subsequent layers of tissue including the fascia, followed by the Bovie electrocautery for hemostasis  The fascia was incised at the midline and the fascial incision was extended bilaterally using the scalpel and curved Huggins scissors  The superior edge of the fascial incision was grasped with Kocher clamps, tented up and the underlying rectus muscles were dissected off bluntly and sharply using the scalpel  The rectus muscles were then divided at midline using the scalpel  Extensive adhesive disease was noted between the uterus and anterior abdominal wall  The peritoneum incision was gently extended laterally, taking care to avoid adhesions  A bladder blade was inserted and a transverse incision was made in the lower uterine segment using a new surgical blade  The uterine incision was extended cephalad and caudal using blunt dissection  The amniotic sac was entered and the amniotic fluid was noted to be clear  The surgeon's hand was placed into the uterine cavity  The fetus was noted to have rotated to breech presentation from vertex presentation after rupture of membranes  The fetal sacrum was palpated and delivered through the hysterotomy, and the legs delivered spontaneously  The arms were palpated, elbow flexed, and were swept across the chest for delivery  The fetal zygomatic arches were palpated and fetal head was flexed for delivery  There was no nuchal cord noted, however, a true know was present on the cord  On delivery the cord was doubly clamped and cut after delayed cord clamping  The infant was then passed off the table to the awaiting  staff  The  was noted to cry spontaneously and moved all extremities   Venous and arterial blood gas, cord blood, and portion of cord was obtained for analysis and routine blood testing  The placenta delivery was then sent to storage  Placenta was noted to be intact with a centrally inserted three-vessel cord  Oxytocin was administered by IV infusion to enhance uterine contraction  The uterus was exteriorized and cleared of all clots and remaining products of conception  A left sided uterine extension was noted  The uterine incision was re approximated using a 0 Vicryl in a running locked fashion  A second horizontal imbricating stitch with 0 Monocryl was applied  The uterine incision was examined and noted to be hemostatic  The posterior cul-de-sac was cleared of all clots and products of conception  The uterus was replaced into the abdomen and the pericolic gutters were cleared of all clots  Nu-knit was added to enhance hemostasis  The uterine incision was once again reexamined and noted to be hemostatic  The fascia was re approximated using 0 Vicryl in a running nonlocked fashion  The subcutaneous tissue was irrigated and cleared of all clots and debris  Good hemostasis was noted with Bovie electrocautery  The subcutaneous  tissue was reapproximated with 2-0 plain suture in a running non-locked fashion  The skin incision was closed using 4-0 Monocryl with Exofin applied over top  Good hemostasis was noted  Patient tolerated the procedure well  All needle, sponge, and instrument counts were noted to be correct x 2 at the end of the procedure  Patient was transferred to the recovery room in stable condition  Dr Sharron Rincon was present for the procedure        Patient Disposition:  PACU     SIGNATURE: Lexis Mclaughlin MD  DATE: February 26, 2021  TIME: 4:09 PM

## 2021-02-26 NOTE — DISCHARGE INSTRUCTIONS
WHAT YOU NEED TO KNOW:   A , or  section, is abdominal surgery to deliver your baby  DISCHARGE INSTRUCTIONS:   Call 911 for any of the following:   · You feel lightheaded, short of breath, and have chest pain  · You cough up blood  Seek care immediately if:   · Blood soaks through your bandage  · Your stitches come apart  · Your arm or leg feels warm, tender, and painful  It may look swollen and red  Contact your OB if:   · You have heavy vaginal bleeding that fills 1 or more sanitary pads in 1 hour  · You have a fever  · Your incision is swollen, red, or draining pus  · You have questions or concerns about yourself or your baby  Medicines: You may  need any of the following:  · Prescription pain medicine  may be given  Ask how to take this medicine safely  · Acetaminophen  decreases pain and fever  It is available without a doctor's order  Ask how much to take and how often to take it  Follow directions  Acetaminophen can cause liver damage if not taken correctly  · NSAIDs , such as ibuprofen, help decrease swelling, pain, and fever  NSAIDs can cause stomach bleeding or kidney problems in certain people  If you take blood thinner medicine, always ask your healthcare provider if NSAIDs are safe for you  Always read the medicine label and follow directions  · Take your medicine as directed  Contact your healthcare provider if you think your medicine is not helping or if you have side effects  Tell him or her if you are allergic to any medicine  Keep a list of the medicines, vitamins, and herbs you take  Include the amounts, and when and why you take them  Bring the list or the pill bottles to follow-up visits  Carry your medicine list with you in case of an emergency  Wound care:  Carefully wash your wound with soap and water every day  Keep your wound clean and dry  Wear loose, comfortable clothes that do not rub against your wound   Ask your OB about bathing and showering  Limit activity as directed:   · Ask when it is safe for you to drive, walk up stairs, lift heavy objects, and have sex  · Ask when it is okay to exercise, and what types of exercise to do  Start slowly and do more as you get stronger  Drink liquids as directed:  Liquids help keep you hydrated after your procedure and decrease your risk for a blood clot  Ask how much liquid to drink each day and which liquids are best for you  Follow up with your OB as directed: You may need to return to have your stitches or staples removed  Write down your questions so you remember to ask them during your visits  © 2017 [address] [employer] Information is for End User's use only and may not be sold, redistributed or otherwise used for commercial purposes  All illustrations and images included in CareNotes® are the copyrighted property of [employer]  or Dimitris Reyes  The above information is an  only  It is not intended as medical advice for individual conditions or treatments  Talk to your doctor, nurse or pharmacist before following any medical regimen to see if it is safe and effective for you  Postpartum Perineal Care   WHAT YOU NEED TO KNOW:   Postpartum perineal care is care for your perineum after you have a baby  The perineum is your vagina and anus  DISCHARGE INSTRUCTIONS:   Care for your perineum:  Healthcare providers will give you a small squirt bottle and show you how to use it   Do the following after you use the toilet and before you put on a new pad:  · Remove the soiled pad    · Use the squirt bottle to rinse your perineum from front to back while you sit on the toilet     · Pat the area dry from front to back with toilet paper or a cotton cloth     · Put on a fresh pad    · Wash your hands  Decrease pain:  Ask your healthcare provider about these and other ways to decrease perineal pain:  · Sitz baths:  Healthcare providers may give you a portable sitz bath  This is a small tub that fits in the toilet  Fill the sitz bath or bathtub with 4 to 6 inches of warm water  Sit in the warm water for 20 minutes 2 to 3 times a day  · Ice:  Ice helps decrease swelling and pain  Ice may also help prevent tissue damage  Use an ice pack, or put crushed ice in a plastic bag  Cover it with a towel and place it on your perineum for 15 to 20 minutes every hour, or as directed  · Medicine spray, wipes, or pads:  Healthcare providers may give you a medicine spray or wipes soaked with numbing medicine to decrease the pain  Pads that contain an herb called witch hazel may also help reduce pain  Use these after perineal care or a sitz bath  Follow up with your healthcare provider as directed:  Write down your questions so you remember to ask them during your visits  Contact your healthcare provider if:   · You have heavy vaginal bleeding that fills 1 or more sanitary pads in 1 hour  · You have foul-smelling vaginal discharge  · You feel weak or lightheaded  · You have questions or concerns about your condition or care  Seek care immediately or call 911 if:   · You have large blood clots or bright red blood coming from your vagina  · You have abdominal pain, vomiting, and a fever  © 2017 [facility] [facility] Information is for End User's use only and may not be sold, redistributed or otherwise used for commercial purposes  All illustrations and images included in CareNotes® are the copyrighted property of [employer]  or Dimitris Reyes  The above information is an  only  It is not intended as medical advice for individual conditions or treatments  Talk to your doctor, nurse or pharmacist before following any medical regimen to see if it is safe and effective for you  Postpartum Depression   WHAT YOU NEED TO KNOW:   What is postpartum depression?   Postpartum depression is a mood disorder that occurs after giving birth  A mood is an emotion or a feeling  Moods affect your behavior and how you feel about yourself and life in general  Depression is a sad mood that you cannot control  Women often feel sad, afraid, or nervous after their baby is born  These feelings are called postpartum blues or baby blues, and they usually go away in 1 to 2 weeks  With postpartum depression, these symptoms get worse and continue for more than 2 weeks  Postpartum depression is a serious condition that affects your daily activities and relationships  What causes postpartum depression? Healthcare providers do not know exactly what causes postpartum depression  It may be caused by a sudden drop in hormone levels after childbirth  A previous episode of postpartum depression or a family history of depression may increase your risk  Several things may trigger postpartum depression:  · Lack of support from the baby's father or other family members    · Feeling more tired than usual    · Stress, a poor diet, or lack of sleep    · Pain after childbirth or pain during breastfeeding    · Sudden change in lifestyle  How is postpartum depression diagnosed? Postpartum depression affects your daily activities and your relationships with other people  Healthcare providers will ask you questions about your signs and symptoms and how they are affecting your life  The symptoms of postpartum depression usually begin within 1 month after childbirth  You feel depressed or lose interest in activities you enjoy nearly every day for at least 2 weeks  You also have 4 or more of the following symptoms:  · You feel tired or have less energy than usual      · You feel unimportant or guilty most of the time  · You think about hurting or killing yourself  · Your appetite changes  You may lose your appetite and lose weight without trying  Your appetite may also increase and you may gain weight  · You are restless, irritable, or withdrawn      · You have trouble concentrating and remembering things  You have trouble doing daily tasks or making decisions  · You have trouble sleeping, even after the baby is asleep  How is postpartum depression treated? · Psychotherapy:  During therapy, you will talk with healthcare providers about how to cope with your feelings and moods  This can be done alone or in a group  It may also be done with family members or your partner  · Antidepressants: This medicine is given to decrease or stop the symptoms of depression  You usually need to take antidepressants for several weeks before you begin to feel better  Do not stop taking antidepressants unless your healthcare provider tells you to  Healthcare providers may try a different antidepressant if one type does not work  What can I do to feel better? · Rest:  Do not try to do everything all at the same time  Do only what is needed and let other things wait until later  Ask your family or friends for help, especially if you have other children  Ask your partner to help with night feedings or other baby care  Try to sleep when the baby naps  · Get emotional support:  Share your feelings with your partner, a friend, or another mother  · Take care of yourself:  Shower and dress each day  Do not skip meals  Try to get out of the house a little each day  Get regular exercise  Eat a healthy diet  Avoid alcohol because it can make your depression worse  Do not isolate yourself  Go for a walk or meet with a friend  It is also important that you have some time by yourself each day  How do I find support and more information? · [address] [place] [address], Public Information & Communication Branch  [address], [address], [address]  [place] [address]   Phone: [phone]  Phone: [phone]  Web Address: [facility]  When should I contact my healthcare provider?    · You cannot make it to your next visit     · Your depression does not get better with treatment or it gets worse  · You have questions or concerns about your condition or care  When should I seek immediate care or call 911? · You think about hurting or killing yourself, your baby, or someone else  · You feel like other people want to hurt you  · You hear voices telling you to hurt yourself or your baby  CARE AGREEMENT:   You have the right to help plan your care  Learn about your health condition and how it may be treated  Discuss treatment options with your caregivers to decide what care you want to receive  You always have the right to refuse treatment  The above information is an  only  It is not intended as medical advice for individual conditions or treatments  Talk to your doctor, nurse or pharmacist before following any medical regimen to see if it is safe and effective for you  ©  [address] Information is for End User's use only and may not be sold, redistributed or otherwise used for commercial purposes  All illustrations and images included in CareNotes® are the copyrighted property of [employer]  or Dimitris Reyes  Postpartum Bleeding   WHAT YOU NEED TO KNOW:   Postpartum bleeding is vaginal bleeding after childbirth  This bleeding is normal, whether your baby was born vaginally or by   It contains blood and the tissue that lined the inside of your uterus when you were pregnant  DISCHARGE INSTRUCTIONS:   What to expect with postpartum bleeding:  Postpartum bleeding usually lasts at least 10 days, and may last longer than 6 weeks  Your bleeding may range from light (barely staining a pad) to heavy (soaking a pad in 1 hour)  Usually, you have heavier bleeding right after childbirth, which slows over the next few weeks until it stops  The bleeding is red or dark brown with clots for the first 1 to 3 days   It then turns pink for several days, and then becomes a white or yellow discharge until it ends  Follow up with your obstetrician as directed:  Do not have sex until your obstetrician says it is okay  Write down your questions so you remember to ask them during your visits  Contact your healthcare provider or obstetrician if:   · Your bleeding increases, or you have heavy bleeding that soaks a pad in 1 hour for 2 hours in a row  · You pass large blood clots  · You are breathing faster than normal, or your heart is beating faster than normal     · You are urinating less than usual, or not at all  · You feel dizzy  · You have questions or concerns about your condition or care  Seek immediate care or call 911 if:   · You are suddenly short of breath and feel lightheaded  · You have sudden chest pain  © 2017 [address]  Information is for End User's use only and may not be sold, redistributed or otherwise used for commercial purposes  All illustrations and images included in CareNotes® are the copyrighted property of [employer]  or Dimitris Reyes  The above information is an  only  It is not intended as medical advice for individual conditions or treatments  Talk to your doctor, nurse or pharmacist before following any medical regimen to see if it is safe and effective for you  Breast Care for the Breast Feeding Mother   WHAT YOU SHOULD KNOW:   Your breasts will go through normal changes while you are breastfeeding  Sometimes breast and nipple problems can develop while you are breastfeeding  Learn about changes that are normal and those that may be a problem  Breast care can help you prevent and manage problems so you and your baby can enjoy the benefits of breastfeeding  AFTER YOU LEAVE:   Breast changes while you are breastfeeding:   · For the first few days after your baby is born, your body makes a small amount of breast milk (colostrum)   Within about 2 to 5 days, your body will begin making mature milk  It may take up to 10 days or longer for mature milk to come in  When your mature milk comes in, your breasts will become full and firm  They may feel tender  · Breastfeeding your baby will decrease the full feeling in your breasts  You may feel a tingly sensation during feedings as milk is released from your breasts  This is called the milk let-down reflex  After 7 or more days, the fullness may feel like it has decreased  Your nipples should look the same as they did before you started breastfeeding  Breasts that feel full before and empty after breastfeeding are signs that breastfeeding is going well  Breast problems that can occur while you are breastfeeding:   · Nipple soreness  may occur when you begin to breastfeed your baby  You may also have nipple soreness if your baby is not latched on to your breast correctly  Correct positioning and latch-on may decrease or stop the pain in your nipples  Work with your caregivers to help your baby latch on correctly  It may also be helpful to place warm, wet compresses on your nipples to help decrease pain  · Plugged milk ducts  may cause painful breast lumps  Plugged ducts may be caused by not emptying your breasts completely during feedings  When your baby pauses during breastfeeding, massage and gently squeeze your breast  Gentle massage may unplug a blocked milk duct  Pump out any milk left in your breasts after your baby is done breastfeeding  Avoid wearing tight tops, tight bras, or under-wire bras, because they may put pressure on your breasts  · Engorgement  may occur as your milk comes in soon after you begin breastfeeding  Engorgement may cause your breasts to become swollen and painful  Your breasts may also become engorged if you miss a feeding or you do not breastfeed on demand  The best way to decrease engorgement symptoms is to empty your breasts by feeding your baby often   Engorgement can make it hard for your baby to latch on to your breast  If this happens, express a small amount of milk and then have your baby latch on  Cold compresses, gel packs, or ice packs on your breasts can help decrease pain and swelling  Ask your caregiver how often and how long you should use cold, or ice packs  · A breast infection called mastitis  can develop if you have plugged milk ducts or engorgement  Mastitis causes your breasts to become red, swollen, and painful  You may also have flu-like symptoms, such as chills and a fever  Place heat on your breasts to help decrease the pain  You may want to place a moist, warm cloth on the painful breast or both of your breasts  Ask how often to do this  Your primary healthcare provider [facility]) may suggest that you take an NSAID, such as ibuprofen, to decrease pain and swelling  He may also order antibiotics to treat mastitis  Ask about feeding your baby when you have a breast infection  How to help prevent or manage breast problems while you are breastfeeding:   · Learn how to position your baby and latch him on correctly  To latch your baby correctly to your breast, make sure that his mouth covers most of your areola (dark area around your nipple)  He should not be attached only to the nipple  Your baby is latched on well if you feel comfortable and do not feel pain  A correct latch helps him get enough milk and can help to prevent sore nipples and other breast problems  There are several breastfeeding positions that you can try  Find the position that works best for you and your baby  Ask your caregiver for more information about how to hold and breastfeed your baby  · Prevent biting  Your baby may get teeth at about 1to 3months of age  To help prevent biting, break his suction once he is finished or if he has fallen asleep  To break his suction, slip a finger into the side of his mouth  If your baby bites you, respond with surprise or unhappiness  Offer praise when he does not bite you       · Breastfeed your baby regularly  Feed your baby 8 to 12 times a day  You may need to wake up your baby at night to feed him  It is okay to feed from 1 or both breasts at each feeding  Your baby should breastfeed from both breasts equally over the course of a day  If your baby only feeds from 1 side during a feeding, offer your other breast to him first for the next feeding  · Schedule and keep follow-up visits  Talk to your baby's pediatrician or your PHP during follow-up visits if you have breast problems  Caregivers may suggest that you, or you and your partner, attend classes on breastfeeding  You also may want to join a breastfeeding support group  Caregivers may suggest that you see a lactation consultant  This is a caregiver who can help you with breastfeeding  Contact your PHP if:   · You have a fever and chills  · You have body aches and you feel like you do not have any energy  · One or both of your breasts is red, swollen or hard, painful, and feels warm or hot  · You have breast engorgement that does not get better within 24 hours  · You see or feel a lump in your breast that hurts when you touch it  · You have nipple pain during breastfeeding or between feedings  · Your nipples are red, dry, cracked, or bleeding, or they have scabs on them  · You have questions or concerns about your condition or care  © 2014 [address] is for End User's use only and may not be sold, redistributed or otherwise used for commercial purposes  All illustrations and images included in CareNotes® are the copyrighted property of [employer]  or Dimitris Reyes  The above information is an  only  It is not intended as medical advice for individual conditions or treatments  Talk to your doctor, nurse or pharmacist before following any medical regimen to see if it is safe and effective for you

## 2021-02-26 NOTE — H&P
H&P Exam - Obstetrics   Maryam Ramirez Ponist 38 y o  female MRN: [id number]  Unit/Bed#: LD TRIAGE  Encounter: [id number]      History of Present Illness     Chief Complaint: Elective  Section, Repeat    HPI:  Jackelin Myrick is a 38 y o  [id number] female with an SENA of 3/1/2021, by Ultrasound at 39w4d weeks gestation who is being admitted for a repeat  section  The patient was considering attempting to [address], but after counseling ultimately decided on a repeat  delivery    Contractions: no  Loss of fluid: no  Vaginal bleeding: no  Fetal movement: yes    She is [place] patient  PREGNANCY COMPLICATIONS:   1) AMA  2) history of two prior  sections   3) polyhydramnios    OB History    Para Term  AB Living   4 2 2 0 1 2   SAB TAB Ectopic Multiple Live Births   1 0 0 0 2      # Outcome Date GA Lbr Tray/2nd Weight Sex Delivery Anes PTL Lv   4 Current            3 Term 18 40w3d 10:20 / 04:02 3714 g (8 lb 3 oz) M CS-LTranv EPI N RIYA      Complications: Cephalopelvic Disproportion, Failure to Progress in Second Stage   2 SAB 17     SAB      1 Term 12/10/14 39w3d  3544 g (7 lb 13 oz) M CS-LTranv EPI N RIYA      Complications: Cephalopelvic Disproportion       Baby complications/comments: vertex, last ultrasound : EFW: 4104grams (9lb 1oz), anterior placenta    Review of Systems   Constitutional: Negative for chills and fever  Eyes: Negative for visual disturbance  Respiratory: Negative for shortness of breath  Cardiovascular: Negative for chest pain  Gastrointestinal: Negative for constipation, diarrhea, nausea and vomiting  Genitourinary: Negative for pelvic pain, urgency, vaginal bleeding, vaginal discharge and vaginal pain  Neurological: Negative for headaches         Historical Information   Past Medical History:   Diagnosis Date    Abnormal Pap smear of cervix     , spontaneous complete 2017    Transitioned From: Spontaneous , incomplete    HPV (human papilloma virus) infection     UTI (urinary tract infection) 2016    Varicella     vaccine     Past Surgical History:   Procedure Laterality Date     SECTION      2014 CPD?  COLPOSCOPY      2016    AL  DELIVERY ONLY N/A 2018    Procedure:  SECTION () REPEAT;  Surgeon: Yoav Garcia MD;  Location: [facility];  Service: Obstetrics    WISDOM TOOTH EXTRACTION Bilateral      Social History   Social History     Substance and Sexual Activity   Alcohol Use Not Currently    Alcohol/week: 2 0 standard drinks    Types: 2 Cans of beer per week     Social History     Substance and Sexual Activity   Drug Use No     Social History     Tobacco Use   Smoking Status Never Smoker   Smokeless Tobacco Never Used     Family History: non-contributory    Meds/Allergies      Medications Prior to Admission   Medication    calcium carbonate (TUMS) 500 mg chewable tablet    Ferrous Sulfate (IRON PO)    MAGNESIUM PO    Prenat w/o K-VC-Ldgbhfo-FA-DHA (PNV-DHA PO)    hydrocortisone (ANUSOL-HC) 2 5 % rectal cream    lidocaine (LMX) 4 % cream      No Known Allergies    OBJECTIVE:    Vitals: Blood pressure 98/56, pulse 78, temperature 98 5 °F (36 9 °C), temperature source Oral, resp  rate 18, height 5' 4" (1 626 m), weight 77 1 kg (170 lb), last menstrual period 2020, SpO2 100 %, not currently breastfeeding  Body mass index is 29 18 kg/m²  Physical Exam  Vitals signs and nursing note reviewed  Constitutional:       Appearance: She is well-developed  Cardiovascular:      Rate and Rhythm: Normal rate and regular rhythm  Pulmonary:      Effort: Pulmonary effort is normal       Breath sounds: Normal breath sounds  Abdominal:      Tenderness: There is no abdominal tenderness  There is no guarding or rebound  Comments: gravid   Musculoskeletal: Normal range of motion  General: No tenderness     Neurological:      Mental Status: She is alert and oriented to person, place, and time           Cervix: deferred       Fetal heart rate:   Baseline Rate: 138 bpm  Variability: Moderate 6-25 bpm  Accelerations: 15 x 15 or greater  Decelerations: None  FHR Category: Category I    Francisco:   Contraction Frequency (minutes): irrit  Contraction Duration (seconds): 30  Contraction Quality: Mild    EFW: 9 5 pounds    GBS: negative    Prenatal Labs:   Blood Type:   Lab Results   Component Value Date/Time    ABO Grouping O 02/26/2021 11:06 AM     , D (Rh type):   Lab Results   Component Value Date/Time    Rh Factor Positive 02/26/2021 11:06 AM     , Antibody Screen: No results found for: ANTIBODYSCR , HCT/HGB:   Lab Results   Component Value Date/Time    Hematocrit 33 7 (L) 02/26/2021 11:06 AM    Hemoglobin 11 0 (L) 02/26/2021 11:06 AM      , MCV:   Lab Results   Component Value Date/Time    MCV 90 02/26/2021 11:06 AM      , Platelets:   Lab Results   Component Value Date/Time    Platelets 873 52/08/0819 11:06 AM      , 1 hour Glucola:   Lab Results   Component Value Date/Time    Glucose 129 01/14/2021 09:28 AM   , 3 hour GTT: No results found for: DYACETE9PQ, Varicella: No results found for: VARICELLAIGG    , Rubella:   Lab Results   Component Value Date/Time    Rubella IgG Quant 130 9 06/22/2018 08:29 AM        , VDRL/RPR:   Lab Results   Component Value Date/Time    RPR Non-Reactive 12/26/2018 11:08 AM      , Urine Culture/Screen:   Lab Results   Component Value Date/Time    Urine Culture 20,000-29,000 cfu/ml  10/22/2020 09:11 AM       , Urine Drug Screen: No results found for: AMPHETUR, BARBTUR, BDZUR, THCUR, COCAINEUR, METHADONEUR, OPIATEUR, PCPUR, MTHAMUR, ECSTASYUR, TRICYCLICSUR, Hep B:   Lab Results   Component Value Date/Time    Hepatitis B Surface Ag Non-reactive 06/22/2018 08:29 AM     , Hep C: No components found for: HEPCSAG, EXTHEPCSAG   , HIV:   Lab Results   Component Value Date/Time    HIV-1/HIV-2 Ab Non-Reactive 06/22/2018 08:30 AM     , Chlamydia: No results found for: EXTCHLAMYDIA  , Gonorrhea:   Lab Results   Component Value Date/Time    N gonorrhoeae, DNA Probe Negative 2020 08:48 AM    N gonorrhoeae, DNA Probe N  gonorrhoeae Amplified DNA Negative 2018 11:58 AM         Invasive Devices     Peripheral Intravenous Line            Peripheral IV 21 Left;Dorsal (posterior) Forearm less than 1 day                  Assessment/Plan     ASSESSMENT:  43yo  at 39w4d weeks gestation who is being admitted for scheduled repeat  section  PLAN:    - Admit for repeat  section  - CBC, RPR, Type and screen  - Ancef 2g IV  - Anesthesia aware  - NPO, IV fluids, SCDs, Castro catheter    Discussed with Dr Garcia Prieto      This patient will be an INPATIENT  and I certify the anticipated length of stay is >2 Midnights      Savana Medley MD  2021  12:56 PM

## 2021-02-27 LAB
BASOPHILS # BLD AUTO: 0.06 THOUSANDS/ΜL (ref 0–0.1)
BASOPHILS NFR BLD AUTO: 0 % (ref 0–1)
EOSINOPHIL # BLD AUTO: 0.07 THOUSAND/ΜL (ref 0–0.61)
EOSINOPHIL NFR BLD AUTO: 0 % (ref 0–6)
ERYTHROCYTE [DISTWIDTH] IN BLOOD BY AUTOMATED COUNT: 15.3 % (ref 11.6–15.1)
HCT VFR BLD AUTO: 25.1 % (ref 34.8–46.1)
HGB BLD-MCNC: 8.4 G/DL (ref 11.5–15.4)
IMM GRANULOCYTES # BLD AUTO: 0.23 THOUSAND/UL (ref 0–0.2)
IMM GRANULOCYTES NFR BLD AUTO: 1 % (ref 0–2)
LYMPHOCYTES # BLD AUTO: 2.94 THOUSANDS/ΜL (ref 0.6–4.47)
LYMPHOCYTES NFR BLD AUTO: 13 % (ref 14–44)
MCH RBC QN AUTO: 30.2 PG (ref 26.8–34.3)
MCHC RBC AUTO-ENTMCNC: 33.5 G/DL (ref 31.4–37.4)
MCV RBC AUTO: 90 FL (ref 82–98)
MONOCYTES # BLD AUTO: 3.11 THOUSAND/ΜL (ref 0.17–1.22)
MONOCYTES NFR BLD AUTO: 13 % (ref 4–12)
NEUTROPHILS # BLD AUTO: 16.8 THOUSANDS/ΜL (ref 1.85–7.62)
NEUTS SEG NFR BLD AUTO: 73 % (ref 43–75)
NRBC BLD AUTO-RTO: 0 /100 WBCS
PLATELET # BLD AUTO: 231 THOUSANDS/UL (ref 149–390)
PMV BLD AUTO: 10.8 FL (ref 8.9–12.7)
RBC # BLD AUTO: 2.78 MILLION/UL (ref 3.81–5.12)
WBC # BLD AUTO: 23.21 THOUSAND/UL (ref 4.31–10.16)

## 2021-02-27 PROCEDURE — 85025 COMPLETE CBC W/AUTO DIFF WBC: CPT | Performed by: OBSTETRICS & GYNECOLOGY

## 2021-02-27 PROCEDURE — 99024 POSTOP FOLLOW-UP VISIT: CPT | Performed by: OBSTETRICS & GYNECOLOGY

## 2021-02-27 RX ORDER — OXYCODONE HYDROCHLORIDE 5 MG/1
5 TABLET ORAL ONCE AS NEEDED
Status: COMPLETED | OUTPATIENT
Start: 2021-02-27 | End: 2021-02-27

## 2021-02-27 RX ORDER — IBUPROFEN 600 MG/1
600 TABLET ORAL EVERY 6 HOURS PRN
Status: DISCONTINUED | OUTPATIENT
Start: 2021-02-27 | End: 2021-02-28

## 2021-02-27 RX ADMIN — SODIUM CHLORIDE, SODIUM LACTATE, POTASSIUM CHLORIDE, AND CALCIUM CHLORIDE 125 ML/HR: .6; .31; .03; .02 INJECTION, SOLUTION INTRAVENOUS at 00:40

## 2021-02-27 RX ADMIN — OXYCODONE HYDROCHLORIDE 10 MG: 10 TABLET ORAL at 15:58

## 2021-02-27 RX ADMIN — ACETAMINOPHEN 650 MG: 325 TABLET, FILM COATED ORAL at 17:27

## 2021-02-27 RX ADMIN — KETOROLAC TROMETHAMINE 30 MG: 30 INJECTION, SOLUTION INTRAMUSCULAR at 00:03

## 2021-02-27 RX ADMIN — SIMETHICONE 80 MG: 80 TABLET, CHEWABLE ORAL at 17:28

## 2021-02-27 RX ADMIN — OXYCODONE HYDROCHLORIDE 5 MG: 5 TABLET ORAL at 11:29

## 2021-02-27 RX ADMIN — ACETAMINOPHEN 650 MG: 325 TABLET, FILM COATED ORAL at 10:09

## 2021-02-27 RX ADMIN — DOCUSATE SODIUM 100 MG: 100 CAPSULE, LIQUID FILLED ORAL at 17:25

## 2021-02-27 RX ADMIN — IBUPROFEN 600 MG: 600 TABLET ORAL at 19:40

## 2021-02-27 RX ADMIN — SIMETHICONE 80 MG: 80 TABLET, CHEWABLE ORAL at 11:11

## 2021-02-27 RX ADMIN — SIMETHICONE 80 MG: 80 TABLET, CHEWABLE ORAL at 06:17

## 2021-02-27 RX ADMIN — ACETAMINOPHEN 650 MG: 325 TABLET, FILM COATED ORAL at 23:06

## 2021-02-27 RX ADMIN — DOCUSATE SODIUM 100 MG: 100 CAPSULE, LIQUID FILLED ORAL at 10:09

## 2021-02-27 RX ADMIN — SIMETHICONE 80 MG: 80 TABLET, CHEWABLE ORAL at 01:23

## 2021-02-27 RX ADMIN — KETOROLAC TROMETHAMINE 30 MG: 30 INJECTION, SOLUTION INTRAMUSCULAR at 06:07

## 2021-02-27 RX ADMIN — ACETAMINOPHEN 650 MG: 325 TABLET, FILM COATED ORAL at 02:45

## 2021-02-27 RX ADMIN — KETOROLAC TROMETHAMINE 30 MG: 30 INJECTION, SOLUTION INTRAMUSCULAR at 12:00

## 2021-02-27 NOTE — PROGRESS NOTES
Progress Note - OB/GYN   Wanda Brand 38 y o  female MRN: [id number]  Unit/Bed#: -01 Encounter: [id number]    Assessment:  38 y o  [id number] s/p Repeat low transverse  section post-operative day 1    Plan:  1  Routine post-partum care  2  Encourage ambulation  3  Pain control as needed  4  Advance diet as tolerated  5  Rhogam not indicated  6  QBL 1007 cc Hgb 11 0 --> AM CBC  7  Anticipate discharge     Subjective/Objective   Chief Complaint:       Subjective:  Wanda Brand is well appearing and has no complaints at this time  She denies any dizziness, nausea, vomiting, chest pain, shortness of breath, palpitations, or headaches  Pain: Well controlled with pain medication regimen  Tolerating PO: yes  Voiding: yes  Flatus: yes  BM: no  Ambulating: yes  Breastfeeding: yes  Chest pain: no  Shortness of breath: no  Leg pain: no  Lochia: Decreasing    Objective:     Vitals: Blood pressure 98/56, pulse 87, temperature 98 1 °F (36 7 °C), temperature source Oral, resp  rate 18, height 5' 4" (1 626 m), weight 77 1 kg (170 lb), last menstrual period 2020, SpO2 97 %, currently breastfeeding      Intake/Output Summary (Last 24 hours) at 2021 0356  Last data filed at 2021 2100  Gross per 24 hour   Intake 2230 ml   Output 2007 ml   Net 223 ml       Physical Exam:     General: NAD  Cardiovascular: RRR, no murmur, nl S1/S2   Lungs: CTAB, non-labored breathing   Abdomen: Soft, no distension/rebound/guarding/tenderness   Fundus: Firm, non-tender, fundus: -1 cm below the umbilicus   Incision: C/D/I  Lower Extremities: Non-tender    Lab, Imaging and other studies:     Recent Results (from the past 72 hour(s))   Type and screen    Collection Time: 21 11:06 AM   Result Value Ref Range    ABO Grouping O     Rh Factor Positive     Antibody Screen Negative     Specimen Expiration Date [id number]    CBC and differential    Collection Time: 21 11:06 AM   Result Value Ref Range    WBC 13 72 (H) 4 31 - 10 16 Thousand/uL    RBC 3 76 (L) 3 81 - 5 12 Million/uL    Hemoglobin 11 0 (L) 11 5 - 15 4 g/dL    Hematocrit 33 7 (L) 34 8 - 46 1 %    MCV 90 82 - 98 fL    MCH 29 3 26 8 - 34 3 pg    MCHC 32 6 31 4 - 37 4 g/dL    RDW 15 4 (H) 11 6 - 15 1 %    MPV 10 9 8 9 - 12 7 fL    Platelets 223 569 - 856 Thousands/uL    nRBC 0 /100 WBCs    Neutrophils Relative 65 43 - 75 %    Immat GRANS % 2 0 - 2 %    Lymphocytes Relative 20 14 - 44 %    Monocytes Relative 11 4 - 12 %    Eosinophils Relative 1 0 - 6 %    Basophils Relative 1 0 - 1 %    Neutrophils Absolute 8 99 (H) 1 85 - 7 62 Thousands/µL    Immature Grans Absolute 0 31 (H) 0 00 - 0 20 Thousand/uL    Lymphocytes Absolute 2 70 0 60 - 4 47 Thousands/µL    Monocytes Absolute 1 49 (H) 0 17 - 1 22 Thousand/µL    Eosinophils Absolute 0 11 0 00 - 0 61 Thousand/µL    Basophils Absolute 0 12 (H) 0 00 - 0 10 Thousands/µL   Blood gas, venous, cord    Collection Time: 02/26/21  2:54 PM   Result Value Ref Range    pH, Cord Sukhjinder 7 256 7 190 - 7 490    pCO2, Cord Sukhjinder 46 2 (H) 27 0 - 43 0 mm HG    pO2, Cord Sukhjinder 18 4 15 0 - 45 0 mm HG    HCO3, Cord Sukhjinder 20 1 12 2 - 28 6 mmol/L    Base Exc, Cord Sukhjinder -7 1 (L) 1 0 - 9 0 mmol/L    O2 Cont, Cord Sukhjinder 8 2 mL/dL    O2 HGB,VENOUS CORD 36 2 %   Blood gas, arterial, cord    Collection Time: 02/26/21  2:54 PM   Result Value Ref Range    pH, Cord Art 7 139 (L) 7 230 - 7 430    pCO2, Cord Art 60 4 (H) 30 0 - 60 0    pO2, Cord Art 18 4 5 0 - 25 0 mm HG    HCO3, Cord Art 20 1 17 3 - 27 3 mmol/L    Base Exc, Cord Art -9 9 (L) 3 0 - 11 0 mmol/L    O2 Content, Cord Art 6 9 ml/dl    O2 Hgb, Arterial Cord 30 0 %     Meds:  acetaminophen, 650 mg, Oral, Q6H  docusate sodium, 100 mg, Oral, BID  ketorolac, 30 mg, Intravenous, Q6H MILAD      acetaminophen, 650 mg, Q6H PRN  benzocaine-menthol-lanolin-aloe, 1 application, Q1N PRN  calcium carbonate, 1,000 mg, Daily PRN  dexamethasone, 8 mg, Once PRN  diphenhydrAMINE, 25 mg, Q6H PRN  fentaNYL, 25 mcg, Q3 min PRN  hydrocortisone, 1 application, Daily PRN  HYDROmorphone, 0 2 mg, Q5 Min PRN  HYDROmorphone, 0 5 mg, Q2H PRN  HYDROmorphone, 1 mg, Q2H PRN  [START ON 2/28/2021] ibuprofen, 600 mg, Q6H PRN  metoclopramide, 5 mg, Q6H PRN  naloxone, 0 1 mg, Q3 min PRN  ondansetron, 4 mg, Q8H PRN  ondansetron, 4 mg, Once PRN  oxyCODONE, 10 mg, Q4H PRN  oxyCODONE, 5 mg, Q4H PRN  simethicone, 80 mg, 4x Daily PRN  witch hazel-glycerin, 1 pad, Q4H PRN              Signature / Title: Trell Melgoza MD, Ob/Gyn, PGY-2  Date: 2/27/2021  Time: 3:56 AM

## 2021-02-27 NOTE — PLAN OF CARE
Problem: PAIN - ADULT  Goal: Verbalizes/displays adequate comfort level or baseline comfort level  Description: Interventions:  - Encourage patient to monitor pain and request assistance  - Assess pain using appropriate pain scale  - Administer analgesics based on type and severity of pain and evaluate response  - Implement non-pharmacological measures as appropriate and evaluate response  - Consider cultural and social influences on pain and pain management  - Notify physician/advanced practitioner if interventions unsuccessful or patient reports new pain  Outcome: Progressing     Problem: INFECTION - ADULT  Goal: Absence or prevention of progression during hospitalization  Description: INTERVENTIONS:  - Assess and monitor for signs and symptoms of infection  - Monitor lab/diagnostic results  - Monitor all insertion sites, i e  indwelling lines, tubes, and drains  - Monitor endotracheal if appropriate and nasal secretions for changes in amount and color  - [place] appropriate cooling/warming therapies per order  - Administer medications as ordered  - Instruct and encourage patient and family to use good hand hygiene technique  - Identify and instruct in appropriate isolation precautions for identified infection/condition  Outcome: Progressing  Goal: Absence of fever/infection during neutropenic period  Description: INTERVENTIONS:  - Monitor WBC    Outcome: Progressing     Problem: SAFETY ADULT  Goal: Patient will remain free of falls  Description: INTERVENTIONS:  - Assess patient frequently for physical needs  -  Identify cognitive and physical deficits and behaviors that affect risk of falls    -  [place] fall precautions as indicated by assessment   - Educate patient/family on patient safety including physical limitations  - Instruct patient to call for assistance with activity based on assessment  - Modify environment to reduce risk of injury  - Consider OT/PT consult to assist with strengthening/mobility  Outcome: Progressing  Goal: Maintain or return to baseline ADL function  Description: INTERVENTIONS:  -  Assess patient's ability to carry out ADLs; assess patient's baseline for ADL function and identify physical deficits which impact ability to perform ADLs (bathing, care of mouth/teeth, toileting, grooming, dressing, etc )  - Assess/evaluate cause of self-care deficits   - Assess range of motion  - Assess patient's mobility; develop plan if impaired  - Assess patient's need for assistive devices and provide as appropriate  - Encourage maximum independence but intervene and supervise when necessary  - Involve family in performance of ADLs  - Assess for home care needs following discharge   - Consider OT consult to assist with ADL evaluation and planning for discharge  - Provide patient education as appropriate  Outcome: Progressing  Goal: Maintain or return mobility status to optimal level  Description: INTERVENTIONS:  - Assess patient's baseline mobility status (ambulation, transfers, stairs, etc )    - Identify cognitive and physical deficits and behaviors that affect mobility  - Identify mobility aids required to assist with transfers and/or ambulation (gait belt, sit-to-stand, lift, walker, cane, etc )  - [place] fall precautions as indicated by assessment  - Record patient progress and toleration of activity level on Mobility SBAR; progress patient to next Phase/Stage  - Instruct patient to call for assistance with activity based on assessment  - Consider rehabilitation consult to assist with strengthening/weightbearing, etc   Outcome: Progressing     Problem: DISCHARGE PLANNING  Goal: Discharge to home or other facility with appropriate resources  Description: INTERVENTIONS:  - Identify barriers to discharge w/patient and caregiver  - Arrange for needed discharge resources and transportation as appropriate  - Identify discharge learning needs (meds, wound care, etc )  - Arrange for interpretive services to assist at discharge as needed  - Refer to Case Management Department for coordinating discharge planning if the patient needs post-hospital services based on physician/advanced practitioner order or complex needs related to functional status, cognitive ability, or social support system  Outcome: Progressing     Problem: POSTPARTUM  Goal: Experiences normal postpartum course  Description: INTERVENTIONS:  - Monitor maternal vital signs  - Assess uterine involution and lochia  Outcome: Progressing  Goal: Appropriate maternal -  bonding  Description: INTERVENTIONS:  - Identify family support  - Assess for appropriate maternal/infant bonding   -Encourage maternal/infant bonding opportunities  - Referral to  or  as needed  Outcome: Progressing  Goal: Establishment of infant feeding pattern  Description: INTERVENTIONS:  - Assess breast/bottle feeding  - Refer to lactation as needed  Outcome: Progressing  Goal: Incision(s), wounds(s) or drain site(s) healing without S/S of infection  Description: INTERVENTIONS  - Assess and document risk factors for skin impairment   - Assess and document dressing, incision, wound bed, drain sites and surrounding tissue  - Consider nutrition services referral as needed  - Oral mucous membranes remain intact  - Provide patient/ family education  Outcome: Progressing

## 2021-02-27 NOTE — LACTATION NOTE
This note was copied from a baby's chart  Observed infant at breast in cradle hold  Good latch but poor positioning  Reviewed correct positioning for a better latch  Reviewed expected  infant feeding patterns in the first few days and encouraged feeding on cue  Given admission breastfeeding pkat and same reviewed  Encouraged to call for assistance as needed

## 2021-02-28 PROCEDURE — 99024 POSTOP FOLLOW-UP VISIT: CPT | Performed by: OBSTETRICS & GYNECOLOGY

## 2021-02-28 RX ORDER — IBUPROFEN 600 MG/1
600 TABLET ORAL EVERY 6 HOURS SCHEDULED
Status: DISCONTINUED | OUTPATIENT
Start: 2021-02-28 | End: 2021-03-02 | Stop reason: HOSPADM

## 2021-02-28 RX ADMIN — DOCUSATE SODIUM 100 MG: 100 CAPSULE, LIQUID FILLED ORAL at 18:07

## 2021-02-28 RX ADMIN — ACETAMINOPHEN 650 MG: 325 TABLET, FILM COATED ORAL at 18:07

## 2021-02-28 RX ADMIN — IBUPROFEN 600 MG: 600 TABLET ORAL at 18:07

## 2021-02-28 RX ADMIN — IBUPROFEN 600 MG: 600 TABLET ORAL at 04:14

## 2021-02-28 RX ADMIN — IBUPROFEN 600 MG: 600 TABLET ORAL at 11:48

## 2021-02-28 RX ADMIN — ACETAMINOPHEN 650 MG: 325 TABLET, FILM COATED ORAL at 06:31

## 2021-02-28 RX ADMIN — ACETAMINOPHEN 650 MG: 325 TABLET, FILM COATED ORAL at 11:47

## 2021-02-28 RX ADMIN — DOCUSATE SODIUM 100 MG: 100 CAPSULE, LIQUID FILLED ORAL at 09:00

## 2021-02-28 NOTE — LACTATION NOTE
This note was copied from a baby's chart  Mom states infant is feeding well  Observed infant at breast in cradle hold  Good positioning and latch noted and reviewed

## 2021-02-28 NOTE — PLAN OF CARE
Problem: PAIN - ADULT  Goal: Verbalizes/displays adequate comfort level or baseline comfort level  Description: Interventions:  - Encourage patient to monitor pain and request assistance  - Assess pain using appropriate pain scale  - Administer analgesics based on type and severity of pain and evaluate response  - Implement non-pharmacological measures as appropriate and evaluate response  - Consider cultural and social influences on pain and pain management  - Notify physician/advanced practitioner if interventions unsuccessful or patient reports new pain  Outcome: Progressing     Problem: INFECTION - ADULT  Goal: Absence or prevention of progression during hospitalization  Description: INTERVENTIONS:  - Assess and monitor for signs and symptoms of infection  - Monitor lab/diagnostic results  - Monitor all insertion sites, i e  indwelling lines, tubes, and drains  - Monitor endotracheal if appropriate and nasal secretions for changes in amount and color  - [place] appropriate cooling/warming therapies per order  - Administer medications as ordered  - Instruct and encourage patient and family to use good hand hygiene technique  - Identify and instruct in appropriate isolation precautions for identified infection/condition  Outcome: Progressing  Goal: Absence of fever/infection during neutropenic period  Description: INTERVENTIONS:  - Monitor WBC    Outcome: Progressing     Problem: SAFETY ADULT  Goal: Patient will remain free of falls  Description: INTERVENTIONS:  - Assess patient frequently for physical needs  -  Identify cognitive and physical deficits and behaviors that affect risk of falls    -  [place] fall precautions as indicated by assessment   - Educate patient/family on patient safety including physical limitations  - Instruct patient to call for assistance with activity based on assessment  - Modify environment to reduce risk of injury  - Consider OT/PT consult to assist with strengthening/mobility  Outcome: Progressing  Goal: Maintain or return to baseline ADL function  Description: INTERVENTIONS:  -  Assess patient's ability to carry out ADLs; assess patient's baseline for ADL function and identify physical deficits which impact ability to perform ADLs (bathing, care of mouth/teeth, toileting, grooming, dressing, etc )  - Assess/evaluate cause of self-care deficits   - Assess range of motion  - Assess patient's mobility; develop plan if impaired  - Assess patient's need for assistive devices and provide as appropriate  - Encourage maximum independence but intervene and supervise when necessary  - Involve family in performance of ADLs  - Assess for home care needs following discharge   - Consider OT consult to assist with ADL evaluation and planning for discharge  - Provide patient education as appropriate  Outcome: Progressing  Goal: Maintain or return mobility status to optimal level  Description: INTERVENTIONS:  - Assess patient's baseline mobility status (ambulation, transfers, stairs, etc )    - Identify cognitive and physical deficits and behaviors that affect mobility  - Identify mobility aids required to assist with transfers and/or ambulation (gait belt, sit-to-stand, lift, walker, cane, etc )  - [place] fall precautions as indicated by assessment  - Record patient progress and toleration of activity level on Mobility SBAR; progress patient to next Phase/Stage  - Instruct patient to call for assistance with activity based on assessment  - Consider rehabilitation consult to assist with strengthening/weightbearing, etc   Outcome: Progressing     Problem: DISCHARGE PLANNING  Goal: Discharge to home or other facility with appropriate resources  Description: INTERVENTIONS:  - Identify barriers to discharge w/patient and caregiver  - Arrange for needed discharge resources and transportation as appropriate  - Identify discharge learning needs (meds, wound care, etc )  - Arrange for interpretive services to assist at discharge as needed  - Refer to Case Management Department for coordinating discharge planning if the patient needs post-hospital services based on physician/advanced practitioner order or complex needs related to functional status, cognitive ability, or social support system  Outcome: Progressing     Problem: POSTPARTUM  Goal: Experiences normal postpartum course  Description: INTERVENTIONS:  - Monitor maternal vital signs  - Assess uterine involution and lochia  Outcome: Progressing  Goal: Appropriate maternal -  bonding  Description: INTERVENTIONS:  - Identify family support  - Assess for appropriate maternal/infant bonding   -Encourage maternal/infant bonding opportunities  - Referral to  or  as needed  Outcome: Progressing  Goal: Establishment of infant feeding pattern  Description: INTERVENTIONS:  - Assess breast/bottle feeding  - Refer to lactation as needed  Outcome: Progressing  Goal: Incision(s), wounds(s) or drain site(s) healing without S/S of infection  Description: INTERVENTIONS  - Assess and document risk factors for skin impairment   - Assess and document dressing, incision, wound bed, drain sites and surrounding tissue  - Consider nutrition services referral as needed  - Oral mucous membranes remain intact  - Provide patient/ family education  Outcome: Progressing

## 2021-02-28 NOTE — PROGRESS NOTES
Progress Note - OB/GYN   Durant Adonay 38 y o  female MRN: [id number]  Unit/Bed#: -01 Encounter: [id number]    Assessment:   2 Days Post-Op s/p RLTCS, stable, baby in room with parents    Plan:  1  Post partum and postoperative  - Continue routine post partum care  - Encourage ambulation  - Encourage breastfeeding  - Routine postoperative care    2  Acute blood loss anemia  - QBL 1007  - Hgb 11 0 -> 8 4    3  Discharge planning  - Anticipate discharge POD3        Subjective/Objective   Chief Complaint:     Post delivery  Patient is doing well  Lochia WNL  Pain well controlled  Subjective: This morning, she reports she has been having some increase in postoperative pain, with poorer pain control    She would like to continue to work on pain control today    Pain: yes, incisional and cramping, improved with meds  Tolerating PO: yes  Voiding: yes  Flatus: yes  BM: no  Ambulating: yes  Breastfeeding:  yes  Chest pain: no  Shortness of breath: no  Leg pain: no  Lochia: minimal    Objective:     Vitals: BP 94/51 (BP Location: Left arm)   Pulse 83   Temp 98 3 °F (36 8 °C) (Oral)   Resp 18   Ht 5' 4" (1 626 m)   Wt 77 1 kg (170 lb)   LMP 05/25/2020 (Exact Date)   SpO2 97%   Breastfeeding Yes   BMI 29 18 kg/m²       Intake/Output Summary (Last 24 hours) at 2/28/2021 0348  Last data filed at 2/27/2021 1930  Gross per 24 hour   Intake --   Output 1525 ml   Net -1525 ml       Lab Results   Component Value Date    WBC 23 21 (H) 02/27/2021    HGB 8 4 (L) 02/27/2021    HCT 25 1 (L) 02/27/2021    MCV 90 02/27/2021     02/27/2021       Physical Exam:   Physical Exam  NAD  Breathing comfortably on room air  Abdomen soft, nontender, nondistended  Uterine fundus firm, nontender, at 1cm the umbilicus  Incision c/d/i, no erythema or drainage  WWP, intact distal pulses    Shaquille Oconnor MD  2/28/2021  3:48 AM

## 2021-03-01 LAB — RPR SER QL: NORMAL

## 2021-03-01 PROCEDURE — 99024 POSTOP FOLLOW-UP VISIT: CPT | Performed by: OBSTETRICS & GYNECOLOGY

## 2021-03-01 RX ORDER — ACETAMINOPHEN 325 MG/1
650 TABLET ORAL EVERY 6 HOURS PRN
Refills: 0
Start: 2021-03-01 | End: 2021-03-02 | Stop reason: SDUPTHER

## 2021-03-01 RX ORDER — OXYCODONE HYDROCHLORIDE 5 MG/1
5 TABLET ORAL EVERY 4 HOURS PRN
Qty: 10 TABLET | Refills: 0
Start: 2021-03-01 | End: 2021-03-02 | Stop reason: SDUPTHER

## 2021-03-01 RX ORDER — IBUPROFEN 600 MG/1
600 TABLET ORAL EVERY 6 HOURS SCHEDULED
Qty: 30 TABLET | Refills: 0
Start: 2021-03-01 | End: 2021-03-02 | Stop reason: SDUPTHER

## 2021-03-01 RX ADMIN — ACETAMINOPHEN 650 MG: 325 TABLET, FILM COATED ORAL at 00:35

## 2021-03-01 RX ADMIN — ACETAMINOPHEN 650 MG: 325 TABLET, FILM COATED ORAL at 19:15

## 2021-03-01 RX ADMIN — IBUPROFEN 600 MG: 600 TABLET ORAL at 19:15

## 2021-03-01 RX ADMIN — ACETAMINOPHEN 650 MG: 325 TABLET, FILM COATED ORAL at 12:28

## 2021-03-01 RX ADMIN — IBUPROFEN 600 MG: 600 TABLET ORAL at 00:35

## 2021-03-01 RX ADMIN — DOCUSATE SODIUM 100 MG: 100 CAPSULE, LIQUID FILLED ORAL at 17:31

## 2021-03-01 RX ADMIN — IBUPROFEN 600 MG: 600 TABLET ORAL at 06:42

## 2021-03-01 RX ADMIN — IBUPROFEN 600 MG: 600 TABLET ORAL at 12:27

## 2021-03-01 RX ADMIN — ACETAMINOPHEN 650 MG: 325 TABLET, FILM COATED ORAL at 06:42

## 2021-03-01 RX ADMIN — DOCUSATE SODIUM 100 MG: 100 CAPSULE, LIQUID FILLED ORAL at 09:21

## 2021-03-01 NOTE — PROGRESS NOTES
Progress Note - OB/GYN  Dorinda Merlin 38 y o  female MRN: [id number]  Unit/Bed#: -01 Encounter: [id number]      Dorinda Merlin is a patient of [facility]    Subjective/Objective     Chief Complaint: Postoperative State     Subjective:  Patient is s/p *RLTCS for    She is POD# 3  Her pain is well controlled  Her incision is C/D/I  She is recovering well and is stable       Pain: no  Tolerating Oral Intake: yes  Voiding: yes  Flatus: yes  Bowel Movement: no  Ambulating: yes  Breastfeeding: Breastfeeding  Chest Pain: no  Shortness of Breath: no  Leg Pain/Discomfort: no  Lochia: minimal    Vitals:   /62 (BP Location: Right arm)   Pulse 83   Temp 97 5 °F (36 4 °C) (Oral)   Resp 20   Ht 5' 4" (1 626 m)   Wt 77 1 kg (170 lb)   LMP 05/25/2020 (Exact Date)   SpO2 97%   Breastfeeding Yes   BMI 29 18 kg/m²     No intake or output data in the 24 hours ending 03/01/21 0628    Invasive Devices     None                 Physical Exam:   GEN: Chelsea Fraga appears well, alert and oriented x 3, pleasant and cooperative   CARDIO: RRR, no murmurs or rubs  RESP:  CTAB, no wheezes or rales  ABDOMEN: soft, no tenderness, no distention, fundus U-1, Incision C/D/I  EXTREMITIES: SCDs on, Negative Aida's sign bilaterally      Labs:   Admission on 02/26/2021   Component Date Value    ABO Grouping 02/26/2021 O     Rh Factor 02/26/2021 Positive     Antibody Screen 02/26/2021 Negative     Specimen Expiration Date 02/26/2021 [id number]     WBC 02/26/2021 13 72*    RBC 02/26/2021 3 76*    Hemoglobin 02/26/2021 11 0*    Hematocrit 02/26/2021 33 7*    MCV 02/26/2021 90     MCH 02/26/2021 29 3     MCHC 02/26/2021 32 6     RDW 02/26/2021 15 4*    MPV 02/26/2021 10 9     Platelets 97/12/3261 263     nRBC 02/26/2021 0     Neutrophils Relative 02/26/2021 65     Immat GRANS % 02/26/2021 2     Lymphocytes Relative 02/26/2021 20     Monocytes Relative 02/26/2021 11     Eosinophils Relative 02/26/2021 1     Basophils Relative 02/26/2021 1     Neutrophils Absolute 02/26/2021 8 99*    Immature Grans Absolute 02/26/2021 0 31*    Lymphocytes Absolute 02/26/2021 2 70     Monocytes Absolute 02/26/2021 1 49*    Eosinophils Absolute 02/26/2021 0 11     Basophils Absolute 02/26/2021 0 12*    pH, Cord Sukhjinder 02/26/2021 7  256     pCO2, Cord Sukhjinder 02/26/2021 46 2*    pO2, Cord Sukhjinder 02/26/2021 18 4     HCO3, Cord Sukhjinder 02/26/2021 20 1    [facility] Exc, Cord Sukhjinder 02/26/2021 -7 1*    O2 Cont, Cord Sukhjinder 02/26/2021 8 2     O2 HGB,VENOUS CORD 02/26/2021 36 2     pH, Cord Art 02/26/2021 7 139*    pCO2, Cord Art 02/26/2021 60 4*    pO2, Cord Art 02/26/2021 18 4     HCO3, Cord Art 02/26/2021 20 1     Base Exc, Cord Art 02/26/2021 -9 9*    O2 Content, Cord Art 02/26/2021 6 9     O2 Hgb, Arterial Cord 02/26/2021 30 0     HIV-1/HIV-2 AB 08/19/2020 Non-Reactive     Hepatitis B Surface Ag 08/19/2020 non-reactive     RPR 08/19/2020 Non-Reactive     External Rubella IGG Wes* 08/19/2020 immune     WBC 02/27/2021 23 21*    RBC 02/27/2021 2 78*    Hemoglobin 02/27/2021 8 4*    Hematocrit 02/27/2021 25 1*    MCV 02/27/2021 90     MCH 02/27/2021 30 2     MCHC 02/27/2021 33 5     RDW 02/27/2021 15 3*    MPV 02/27/2021 10 8     Platelets 29/22/5743 231     nRBC 02/27/2021 0     Neutrophils Relative 02/27/2021 73     Immat GRANS % 02/27/2021 1     Lymphocytes Relative 02/27/2021 13*    Monocytes Relative 02/27/2021 13*    Eosinophils Relative 02/27/2021 0     Basophils Relative 02/27/2021 0     Neutrophils Absolute 02/27/2021 16 80*    Immature Grans Absolute 02/27/2021 0 23*    Lymphocytes Absolute 02/27/2021 2 94     Monocytes Absolute 02/27/2021 3 11*    Eosinophils Absolute 02/27/2021 0 07     Basophils Absolute 02/27/2021 0 06          Patient Active Problem List   Diagnosis    Arthralgia of multiple joints    Depression with anxiety    Fatigue    Irritable bowel syndrome    Recurrent UTI    Vitamin D deficiency    Migraine    Gastroenteritis    Acute midline thoracic back pain    Pregnancy with history of  section, antepartum    Multigravida of advanced maternal age in third trimester    Desires  (vaginal birth after ) trial    Prenatal care, subsequent pregnancy in third trimester    History of 2  sections    Elderly multigravida, third trimester    Polyhydramnios in third trimester    39 weeks gestation of pregnancy    Macrosomia affecting management of mother    Transverse presentation, antepartum    S/P  section        Assessment and Plan     Sheba Fraga is POD# 3 s/p RLTCS   She is recovering well and is stable     POD# 3   - Preop Hgb 11 --> post op Hgb 8 4   - SCD for DVT prophylaxis    - Continue current medications for pain management    - Encourage ambulation    - Encourage breast feeding     Disposition    - Anticipate discharge home on POD# Calvin Brandt MD  3/1/2021  6:28 AM

## 2021-03-01 NOTE — PLAN OF CARE
Problem: PAIN - ADULT  Goal: Verbalizes/displays adequate comfort level or baseline comfort level  Description: Interventions:  - Encourage patient to monitor pain and request assistance  - Assess pain using appropriate pain scale  - Administer analgesics based on type and severity of pain and evaluate response  - Implement non-pharmacological measures as appropriate and evaluate response  - Consider cultural and social influences on pain and pain management  - Notify physician/advanced practitioner if interventions unsuccessful or patient reports new pain  Outcome: Progressing     Problem: INFECTION - ADULT  Goal: Absence or prevention of progression during hospitalization  Description: INTERVENTIONS:  - Assess and monitor for signs and symptoms of infection  - Monitor lab/diagnostic results  - Monitor all insertion sites, i e  indwelling lines, tubes, and drains  - Monitor endotracheal if appropriate and nasal secretions for changes in amount and color  - [place] appropriate cooling/warming therapies per order  - Administer medications as ordered  - Instruct and encourage patient and family to use good hand hygiene technique  - Identify and instruct in appropriate isolation precautions for identified infection/condition  Outcome: Progressing  Goal: Absence of fever/infection during neutropenic period  Description: INTERVENTIONS:  - Monitor WBC    Outcome: Progressing     Problem: SAFETY ADULT  Goal: Patient will remain free of falls  Description: INTERVENTIONS:  - Assess patient frequently for physical needs  -  Identify cognitive and physical deficits and behaviors that affect risk of falls    -  [place] fall precautions as indicated by assessment   - Educate patient/family on patient safety including physical limitations  - Instruct patient to call for assistance with activity based on assessment  - Modify environment to reduce risk of injury  - Consider OT/PT consult to assist with strengthening/mobility  Outcome: Progressing  Goal: Maintain or return to baseline ADL function  Description: INTERVENTIONS:  -  Assess patient's ability to carry out ADLs; assess patient's baseline for ADL function and identify physical deficits which impact ability to perform ADLs (bathing, care of mouth/teeth, toileting, grooming, dressing, etc )  - Assess/evaluate cause of self-care deficits   - Assess range of motion  - Assess patient's mobility; develop plan if impaired  - Assess patient's need for assistive devices and provide as appropriate  - Encourage maximum independence but intervene and supervise when necessary  - Involve family in performance of ADLs  - Assess for home care needs following discharge   - Consider OT consult to assist with ADL evaluation and planning for discharge  - Provide patient education as appropriate  Outcome: Progressing  Goal: Maintain or return mobility status to optimal level  Description: INTERVENTIONS:  - Assess patient's baseline mobility status (ambulation, transfers, stairs, etc )    - Identify cognitive and physical deficits and behaviors that affect mobility  - Identify mobility aids required to assist with transfers and/or ambulation (gait belt, sit-to-stand, lift, walker, cane, etc )  - [place] fall precautions as indicated by assessment  - Record patient progress and toleration of activity level on Mobility SBAR; progress patient to next Phase/Stage  - Instruct patient to call for assistance with activity based on assessment  - Consider rehabilitation consult to assist with strengthening/weightbearing, etc   Outcome: Progressing     Problem: DISCHARGE PLANNING  Goal: Discharge to home or other facility with appropriate resources  Description: INTERVENTIONS:  - Identify barriers to discharge w/patient and caregiver  - Arrange for needed discharge resources and transportation as appropriate  - Identify discharge learning needs (meds, wound care, etc )  - Arrange for interpretive services to assist at discharge as needed  - Refer to Case Management Department for coordinating discharge planning if the patient needs post-hospital services based on physician/advanced practitioner order or complex needs related to functional status, cognitive ability, or social support system  Outcome: Progressing     Problem: POSTPARTUM  Goal: Experiences normal postpartum course  Description: INTERVENTIONS:  - Monitor maternal vital signs  - Assess uterine involution and lochia  Outcome: Progressing  Goal: Appropriate maternal -  bonding  Description: INTERVENTIONS:  - Identify family support  - Assess for appropriate maternal/infant bonding   -Encourage maternal/infant bonding opportunities  - Referral to  or  as needed  Outcome: Progressing  Goal: Establishment of infant feeding pattern  Description: INTERVENTIONS:  - Assess breast/bottle feeding  - Refer to lactation as needed  Outcome: Progressing  Goal: Incision(s), wounds(s) or drain site(s) healing without S/S of infection  Description: INTERVENTIONS  - Assess and document risk factors for skin impairment   - Assess and document dressing, incision, wound bed, drain sites and surrounding tissue  - Consider nutrition services referral as needed  - Oral mucous membranes remain intact  - Provide patient/ family education  Outcome: Progressing

## 2021-03-01 NOTE — LACTATION NOTE
This note was copied from a baby's chart  CONSULT - LACTATION  Baby Girl Michelle LevoCorina Fraga 3 days female MRN: [id number]    [address] Room / Bed: (N)/(N) Encounter: [id number]    Maternal Information     MOTHER:  Chelsea Fraga  Maternal Age: 38 y o    OB History: # 1 - Date: [birth year], Sex: Male, Weight: 3544 g (7 lb 13 oz), GA: 39w3d, Delivery: , Low Transverse, Apgar1: None, Apgar5: None, Living: Living, Birth Comments: None    # 2 - Date: 17, Sex: None, Weight: None, GA: None, Delivery: Spontaneous , Apgar1: None, Apgar5: None, Living: None, Birth Comments: None    # 3 - Date: 18, Sex: Male, Weight: 3714 g (8 lb 3 oz), GA: 40w3d, Delivery: , Low Transverse, Apgar1: 9, Apgar5: 9, Living: Living, Birth Comments: None    # 4 - Date: 21, Sex: Female, Weight: 4224 g (9 lb 5 oz), GA: 39w4d, Delivery: , Low Transverse, Apgar1: 9, Apgar5: 9, Living: Living, Birth Comments: None   Previouse breast reduction surgery? No    Lactation history:   Has patient previously breast fed: Yes   How long had patient previously breast fed: X 7 mos, X 1 year   Previous breast feeding complications:       Past Surgical History:   Procedure Laterality Date     SECTION      2014 CPD?     COLPOSCOPY      2016    HI  DELIVERY ONLY N/A 2018    Procedure:  SECTION () REPEAT;  Surgeon: Harriet Montgomery MD;  Location: [facility] ;  Service: Obstetrics    HI  DELIVERY ONLY N/A 2021    Procedure:  SECTION () REPEAT;  Surgeon: Lamont Suarez DO;  Location: [facility] ;  Service: Obstetrics    WISDOM TOOTH EXTRACTION Bilateral         Birth information:  YOB: 2022   Time of birth: 2:53 PM   Sex: female   Delivery type: , Low Transverse   Birth Weight: 4224 g (9 lb 5 oz)   Percent of Weight Change: -8%     Gestational Age: 43w3d [unfilled]    Assessment     Breast and nipple assessment: normal assessment     Assessment: normal assessment    Feeding assessment: feeding well  LATCH:  Latch: Grasps breast, tongue down, lips flanged, rhythmic sucking   Audible Swallowing: Spontaneous and intermittent (24 hours old)   Type of Nipple: Everted (After stimulation)   Comfort (Breast/Nipple): Soft/non-tender   Hold (Positioning): No assist from staff, mother able to position/hold infant   LATCH Score: 10          Feeding recommendations:  breast feed on demand Pump and supplement infant with own EBM via SNS at the breast and finger feeding with SNS for hyperbilirubinemia  Called to [address] [place] [address] for assistance feeding her baby her expressed breast milk at the breast via SNS per pediatric's recommendation for hyperbilirubinemia  Joel Alegria says that she is confident that her baby is getting enough breast milk at the breast  She was holding her baby in cradle hold at the left breast   Baby took 10 ml's of expressed breast milk by combination of SNS at the breast and also by finger feeding  Joel Alegria recognized feeling of milk ejection reflex at the time of feeding  She says she does have a breast pump at home  No assistance offered in scheduling follow up lactation support as her baby is latching very well at the breast and she is pumping about 45 ml's at a pumping session at this time  Nursing pads offered for convenience as her breast pads have not yet arrived from home with Chelsea's   Met with mother to go over discharge breastfeeding booklet including the feeding log  Emphasized 8 or more (12) feedings in a 24 hour period, what to expect for the number of diapers per day of life and the progression of properties of the  stooling pattern      Reviewed breastfeeding and your lifestyle, storage and preparation of breast milk, how to keep you breast pump clean, the employed breastfeeding mother and paced bottle feeding handouts  Booklet included Breastfeeding Resources for after discharge including access to the number for the [address]  Met with mother to go over discharge breastfeeding booklet including the feeding log  Emphasized 8 or more (12) feedings in a 24 hour period, what to expect for the number of diapers per day of life and the progression of properties of the  stooling pattern  Reviewed breastfeeding and your lifestyle, storage and preparation of breast milk, how to keep you breast pump clean, the employed breastfeeding mother and paced bottle feeding handouts  Discussed s/s engorgement, blocked milk ducts, and mastitis  Discussed how to remedy at home and when to contact physician  Booklet included Breastfeeding Resources for after discharge including access to the number for the [address]  Discussed s/s engorgement, blocked milk ducts, and mastitis  Discussed how to remedy at home and when to contact physician        Yue Avery RN 3/1/2021 3:06 PM

## 2021-03-02 VITALS
TEMPERATURE: 97.6 F | OXYGEN SATURATION: 98 % | HEART RATE: 78 BPM | SYSTOLIC BLOOD PRESSURE: 109 MMHG | WEIGHT: 170 LBS | HEIGHT: 64 IN | DIASTOLIC BLOOD PRESSURE: 58 MMHG | RESPIRATION RATE: 18 BRPM | BODY MASS INDEX: 29.02 KG/M2

## 2021-03-02 DIAGNOSIS — Z98.891 S/P CESAREAN SECTION: ICD-10-CM

## 2021-03-02 PROCEDURE — 99024 POSTOP FOLLOW-UP VISIT: CPT | Performed by: OBSTETRICS & GYNECOLOGY

## 2021-03-02 RX ORDER — ACETAMINOPHEN 325 MG/1
650 TABLET ORAL EVERY 6 HOURS PRN
Qty: 30 TABLET | Refills: 0 | Status: SHIPPED | OUTPATIENT
Start: 2021-03-02 | End: 2021-03-22

## 2021-03-02 RX ORDER — IBUPROFEN 600 MG/1
600 TABLET ORAL EVERY 6 HOURS SCHEDULED
Qty: 30 TABLET | Refills: 0 | Status: SHIPPED | OUTPATIENT
Start: 2021-03-02 | End: 2021-03-09

## 2021-03-02 RX ORDER — OXYCODONE HYDROCHLORIDE 5 MG/1
5 TABLET ORAL EVERY 4 HOURS PRN
Qty: 10 TABLET | Refills: 0 | Status: SHIPPED | OUTPATIENT
Start: 2021-03-02 | End: 2021-03-12

## 2021-03-02 RX ADMIN — ACETAMINOPHEN 650 MG: 325 TABLET, FILM COATED ORAL at 10:14

## 2021-03-02 RX ADMIN — IBUPROFEN 600 MG: 600 TABLET ORAL at 00:03

## 2021-03-02 RX ADMIN — ACETAMINOPHEN 650 MG: 325 TABLET, FILM COATED ORAL at 00:03

## 2021-03-02 RX ADMIN — IBUPROFEN 600 MG: 600 TABLET ORAL at 06:10

## 2021-03-02 NOTE — PLAN OF CARE
Problem: PAIN - ADULT  Goal: Verbalizes/displays adequate comfort level or baseline comfort level  Description: Interventions:  - Encourage patient to monitor pain and request assistance  - Assess pain using appropriate pain scale  - Administer analgesics based on type and severity of pain and evaluate response  - Implement non-pharmacological measures as appropriate and evaluate response  - Consider cultural and social influences on pain and pain management  - Notify physician/advanced practitioner if interventions unsuccessful or patient reports new pain  Outcome: Completed     Problem: INFECTION - ADULT  Goal: Absence or prevention of progression during hospitalization  Description: INTERVENTIONS:  - Assess and monitor for signs and symptoms of infection  - Monitor lab/diagnostic results  - Monitor all insertion sites, i e  indwelling lines, tubes, and drains  - Monitor endotracheal if appropriate and nasal secretions for changes in amount and color  - [place] appropriate cooling/warming therapies per order  - Administer medications as ordered  - Instruct and encourage patient and family to use good hand hygiene technique  - Identify and instruct in appropriate isolation precautions for identified infection/condition  Outcome: Completed  Goal: Absence of fever/infection during neutropenic period  Description: INTERVENTIONS:  - Monitor WBC    Outcome: Completed     Problem: SAFETY ADULT  Goal: Patient will remain free of falls  Description: INTERVENTIONS:  - Assess patient frequently for physical needs  -  Identify cognitive and physical deficits and behaviors that affect risk of falls    -  [place] fall precautions as indicated by assessment   - Educate patient/family on patient safety including physical limitations  - Instruct patient to call for assistance with activity based on assessment  - Modify environment to reduce risk of injury  - Consider OT/PT consult to assist with strengthening/mobility  Outcome: Completed  Goal: Maintain or return to baseline ADL function  Description: INTERVENTIONS:  -  Assess patient's ability to carry out ADLs; assess patient's baseline for ADL function and identify physical deficits which impact ability to perform ADLs (bathing, care of mouth/teeth, toileting, grooming, dressing, etc )  - Assess/evaluate cause of self-care deficits   - Assess range of motion  - Assess patient's mobility; develop plan if impaired  - Assess patient's need for assistive devices and provide as appropriate  - Encourage maximum independence but intervene and supervise when necessary  - Involve family in performance of ADLs  - Assess for home care needs following discharge   - Consider OT consult to assist with ADL evaluation and planning for discharge  - Provide patient education as appropriate  Outcome: Completed  Goal: Maintain or return mobility status to optimal level  Description: INTERVENTIONS:  - Assess patient's baseline mobility status (ambulation, transfers, stairs, etc )    - Identify cognitive and physical deficits and behaviors that affect mobility  - Identify mobility aids required to assist with transfers and/or ambulation (gait belt, sit-to-stand, lift, walker, cane, etc )  - [place] fall precautions as indicated by assessment  - Record patient progress and toleration of activity level on Mobility SBAR; progress patient to next Phase/Stage  - Instruct patient to call for assistance with activity based on assessment  - Consider rehabilitation consult to assist with strengthening/weightbearing, etc   Outcome: Completed     Problem: DISCHARGE PLANNING  Goal: Discharge to home or other facility with appropriate resources  Description: INTERVENTIONS:  - Identify barriers to discharge w/patient and caregiver  - Arrange for needed discharge resources and transportation as appropriate  - Identify discharge learning needs (meds, wound care, etc )  - Arrange for interpretive services to assist at discharge as needed  - Refer to Case Management Department for coordinating discharge planning if the patient needs post-hospital services based on physician/advanced practitioner order or complex needs related to functional status, cognitive ability, or social support system  Outcome: Completed     Problem: POSTPARTUM  Goal: Experiences normal postpartum course  Description: INTERVENTIONS:  - Monitor maternal vital signs  - Assess uterine involution and lochia  Outcome: Completed  Goal: Appropriate maternal -  bonding  Description: INTERVENTIONS:  - Identify family support  - Assess for appropriate maternal/infant bonding   -Encourage maternal/infant bonding opportunities  - Referral to  or  as needed  Outcome: Completed  Goal: Establishment of infant feeding pattern  Description: INTERVENTIONS:  - Assess breast/bottle feeding  - Refer to lactation as needed  Outcome: Completed  Goal: Incision(s), wounds(s) or drain site(s) healing without S/S of infection  Description: INTERVENTIONS  - Assess and document risk factors for skin impairment   - Assess and document dressing, incision, wound bed, drain sites and surrounding tissue  - Consider nutrition services referral as needed  - Oral mucous membranes remain intact  - Provide patient/ family education  Outcome: Completed

## 2021-03-02 NOTE — PLAN OF CARE
Problem: PAIN - ADULT  Goal: Verbalizes/displays adequate comfort level or baseline comfort level  Description: Interventions:  - Encourage patient to monitor pain and request assistance  - Assess pain using appropriate pain scale  - Administer analgesics based on type and severity of pain and evaluate response  - Implement non-pharmacological measures as appropriate and evaluate response  - Consider cultural and social influences on pain and pain management  - Notify physician/advanced practitioner if interventions unsuccessful or patient reports new pain  Outcome: Progressing     Problem: INFECTION - ADULT  Goal: Absence or prevention of progression during hospitalization  Description: INTERVENTIONS:  - Assess and monitor for signs and symptoms of infection  - Monitor lab/diagnostic results  - Monitor all insertion sites, i e  indwelling lines, tubes, and drains  - Monitor endotracheal if appropriate and nasal secretions for changes in amount and color  - [place] appropriate cooling/warming therapies per order  - Administer medications as ordered  - Instruct and encourage patient and family to use good hand hygiene technique  - Identify and instruct in appropriate isolation precautions for identified infection/condition  Outcome: Progressing  Goal: Absence of fever/infection during neutropenic period  Description: INTERVENTIONS:  - Monitor WBC    Outcome: Progressing     Problem: SAFETY ADULT  Goal: Patient will remain free of falls  Description: INTERVENTIONS:  - Assess patient frequently for physical needs  -  Identify cognitive and physical deficits and behaviors that affect risk of falls    -  [place] fall precautions as indicated by assessment   - Educate patient/family on patient safety including physical limitations  - Instruct patient to call for assistance with activity based on assessment  - Modify environment to reduce risk of injury  - Consider OT/PT consult to assist with strengthening/mobility  Outcome: Progressing  Goal: Maintain or return to baseline ADL function  Description: INTERVENTIONS:  -  Assess patient's ability to carry out ADLs; assess patient's baseline for ADL function and identify physical deficits which impact ability to perform ADLs (bathing, care of mouth/teeth, toileting, grooming, dressing, etc )  - Assess/evaluate cause of self-care deficits   - Assess range of motion  - Assess patient's mobility; develop plan if impaired  - Assess patient's need for assistive devices and provide as appropriate  - Encourage maximum independence but intervene and supervise when necessary  - Involve family in performance of ADLs  - Assess for home care needs following discharge   - Consider OT consult to assist with ADL evaluation and planning for discharge  - Provide patient education as appropriate  Outcome: Progressing  Goal: Maintain or return mobility status to optimal level  Description: INTERVENTIONS:  - Assess patient's baseline mobility status (ambulation, transfers, stairs, etc )    - Identify cognitive and physical deficits and behaviors that affect mobility  - Identify mobility aids required to assist with transfers and/or ambulation (gait belt, sit-to-stand, lift, walker, cane, etc )  - [place] fall precautions as indicated by assessment  - Record patient progress and toleration of activity level on Mobility SBAR; progress patient to next Phase/Stage  - Instruct patient to call for assistance with activity based on assessment  - Consider rehabilitation consult to assist with strengthening/weightbearing, etc   Outcome: Progressing     Problem: DISCHARGE PLANNING  Goal: Discharge to home or other facility with appropriate resources  Description: INTERVENTIONS:  - Identify barriers to discharge w/patient and caregiver  - Arrange for needed discharge resources and transportation as appropriate  - Identify discharge learning needs (meds, wound care, etc )  - Arrange for interpretive services to assist at discharge as needed  - Refer to Case Management Department for coordinating discharge planning if the patient needs post-hospital services based on physician/advanced practitioner order or complex needs related to functional status, cognitive ability, or social support system  Outcome: Progressing     Problem: POSTPARTUM  Goal: Experiences normal postpartum course  Description: INTERVENTIONS:  - Monitor maternal vital signs  - Assess uterine involution and lochia  Outcome: Progressing  Goal: Appropriate maternal -  bonding  Description: INTERVENTIONS:  - Identify family support  - Assess for appropriate maternal/infant bonding   -Encourage maternal/infant bonding opportunities  - Referral to  or  as needed  Outcome: Progressing  Goal: Establishment of infant feeding pattern  Description: INTERVENTIONS:  - Assess breast/bottle feeding  - Refer to lactation as needed  Outcome: Progressing  Goal: Incision(s), wounds(s) or drain site(s) healing without S/S of infection  Description: INTERVENTIONS  - Assess and document risk factors for skin impairment   - Assess and document dressing, incision, wound bed, drain sites and surrounding tissue  - Consider nutrition services referral as needed  - Oral mucous membranes remain intact  - Provide patient/ family education  Outcome: Progressing

## 2021-03-02 NOTE — PLAN OF CARE
Problem: PAIN - ADULT  Goal: Verbalizes/displays adequate comfort level or baseline comfort level  Description: Interventions:  - Encourage patient to monitor pain and request assistance  - Assess pain using appropriate pain scale  - Administer analgesics based on type and severity of pain and evaluate response  - Implement non-pharmacological measures as appropriate and evaluate response  - Consider cultural and social influences on pain and pain management  - Notify physician/advanced practitioner if interventions unsuccessful or patient reports new pain  Outcome: Progressing     Problem: INFECTION - ADULT  Goal: Absence or prevention of progression during hospitalization  Description: INTERVENTIONS:  - Assess and monitor for signs and symptoms of infection  - Monitor lab/diagnostic results  - Monitor all insertion sites, i e  indwelling lines, tubes, and drains  - Monitor endotracheal if appropriate and nasal secretions for changes in amount and color  - [place] appropriate cooling/warming therapies per order  - Administer medications as ordered  - Instruct and encourage patient and family to use good hand hygiene technique  - Identify and instruct in appropriate isolation precautions for identified infection/condition  Outcome: Progressing  Goal: Absence of fever/infection during neutropenic period  Description: INTERVENTIONS:  - Monitor WBC    Outcome: Progressing     Problem: SAFETY ADULT  Goal: Patient will remain free of falls  Description: INTERVENTIONS:  - Assess patient frequently for physical needs  -  Identify cognitive and physical deficits and behaviors that affect risk of falls    -  [place] fall precautions as indicated by assessment   - Educate patient/family on patient safety including physical limitations  - Instruct patient to call for assistance with activity based on assessment  - Modify environment to reduce risk of injury  - Consider OT/PT consult to assist with strengthening/mobility  Outcome: Progressing  Goal: Maintain or return to baseline ADL function  Description: INTERVENTIONS:  -  Assess patient's ability to carry out ADLs; assess patient's baseline for ADL function and identify physical deficits which impact ability to perform ADLs (bathing, care of mouth/teeth, toileting, grooming, dressing, etc )  - Assess/evaluate cause of self-care deficits   - Assess range of motion  - Assess patient's mobility; develop plan if impaired  - Assess patient's need for assistive devices and provide as appropriate  - Encourage maximum independence but intervene and supervise when necessary  - Involve family in performance of ADLs  - Assess for home care needs following discharge   - Consider OT consult to assist with ADL evaluation and planning for discharge  - Provide patient education as appropriate  Outcome: Progressing  Goal: Maintain or return mobility status to optimal level  Description: INTERVENTIONS:  - Assess patient's baseline mobility status (ambulation, transfers, stairs, etc )    - Identify cognitive and physical deficits and behaviors that affect mobility  - Identify mobility aids required to assist with transfers and/or ambulation (gait belt, sit-to-stand, lift, walker, cane, etc )  - [place] fall precautions as indicated by assessment  - Record patient progress and toleration of activity level on Mobility SBAR; progress patient to next Phase/Stage  - Instruct patient to call for assistance with activity based on assessment  - Consider rehabilitation consult to assist with strengthening/weightbearing, etc   Outcome: Progressing     Problem: DISCHARGE PLANNING  Goal: Discharge to home or other facility with appropriate resources  Description: INTERVENTIONS:  - Identify barriers to discharge w/patient and caregiver  - Arrange for needed discharge resources and transportation as appropriate  - Identify discharge learning needs (meds, wound care, etc )  - Arrange for interpretive services to assist at discharge as needed  - Refer to Case Management Department for coordinating discharge planning if the patient needs post-hospital services based on physician/advanced practitioner order or complex needs related to functional status, cognitive ability, or social support system  Outcome: Progressing     Problem: POSTPARTUM  Goal: Experiences normal postpartum course  Description: INTERVENTIONS:  - Monitor maternal vital signs  - Assess uterine involution and lochia  Outcome: Progressing  Goal: Appropriate maternal -  bonding  Description: INTERVENTIONS:  - Identify family support  - Assess for appropriate maternal/infant bonding   -Encourage maternal/infant bonding opportunities  - Referral to  or  as needed  Outcome: Progressing  Goal: Establishment of infant feeding pattern  Description: INTERVENTIONS:  - Assess breast/bottle feeding  - Refer to lactation as needed  Outcome: Progressing  Goal: Incision(s), wounds(s) or drain site(s) healing without S/S of infection  Description: INTERVENTIONS  - Assess and document risk factors for skin impairment   - Assess and document dressing, incision, wound bed, drain sites and surrounding tissue  - Consider nutrition services referral as needed  - Oral mucous membranes remain intact  - Provide patient/ family education  Outcome: Progressing

## 2021-03-02 NOTE — TELEPHONE ENCOUNTER
Pharmacy calling for prescriptions from yesterday  They were all printed  Can you please escribe   I changed them to normal         Oxycodone  Motrin   Tylenol

## 2021-03-02 NOTE — PROGRESS NOTES
Progress Note - OB/GYN  Sai Harmon 38 y o  female MRN: [id number]  Unit/Bed#: -01 Encounter: [id number]      Sai Harmon is a patient of [facility]    Subjective/Objective     Chief Complaint: Postoperative State     Subjective:  Patient is s/p RLTCS  She is POD# 4  Her pain is well controlled  Her incision is C/D/I  Patient stayed an extra day due to increased bili in baby  She is recovering well and is stable       Pain: no  Tolerating Oral Intake: yes  Voiding: yes  Flatus: yes  Bowel Movement: no  Ambulating: yes  Breastfeeding: Breastfeeding  Chest Pain: no  Shortness of Breath: no  Leg Pain/Discomfort: no  Lochia: minimal    Vitals:   /56 (BP Location: Right arm)   Pulse 75   Temp 98 8 °F (37 1 °C) (Oral)   Resp 18   Ht 5' 4" (1 626 m)   Wt 77 1 kg (170 lb)   LMP 05/25/2020 (Exact Date)   SpO2 98%   Breastfeeding Yes   BMI 29 18 kg/m²     No intake or output data in the 24 hours ending 03/02/21 0634    Invasive Devices     None                 Physical Exam:   GEN: Chelsea Fraga appears well, alert and oriented x 3, pleasant and cooperative   CARDIO: RRR, no murmurs or rubs  RESP:  CTAB, no wheezes or rales  ABDOMEN: soft, no tenderness, no distention, fundus U-1, Incision C/D/I  EXTREMITIES: SCDs on, Negative Aida's sign bilaterally      Labs:   Admission on 02/26/2021   Component Date Value    ABO Grouping 02/26/2021 O     Rh Factor 02/26/2021 Positive     Antibody Screen 02/26/2021 Negative     Specimen Expiration Date 02/26/2021 [id number]     WBC 02/26/2021 13 72*    RBC 02/26/2021 3 76*    Hemoglobin 02/26/2021 11 0*    Hematocrit 02/26/2021 33 7*    MCV 02/26/2021 90     MCH 02/26/2021 29 3     MCHC 02/26/2021 32 6     RDW 02/26/2021 15 4*    MPV 02/26/2021 10 9     Platelets 40/99/2162 263     nRBC 02/26/2021 0     Neutrophils Relative 02/26/2021 65     Immat GRANS % 02/26/2021 2     Lymphocytes Relative 02/26/2021 20     Monocytes Relative 02/26/2021 11     Eosinophils Relative 02/26/2021 1     Basophils Relative 02/26/2021 1     Neutrophils Absolute 02/26/2021 8 99*    Immature Grans Absolute 02/26/2021 0 31*    Lymphocytes Absolute 02/26/2021 2 70     Monocytes Absolute 02/26/2021 1 49*    Eosinophils Absolute 02/26/2021 0 11     Basophils Absolute 02/26/2021 0 12*    RPR 02/26/2021 Non-Reactive     pH, Cord Sukhjinder 02/26/2021 7  256     pCO2, Cord Sukhjinder 02/26/2021 46 2*    pO2, Cord Sukhjinder 02/26/2021 18 4     HCO3, Cord Sukhjinder 02/26/2021 20 1     Base Exc, Cord Sukhjinder 02/26/2021 -7 1*    O2 Cont, Cord Sukhjinder 02/26/2021 8 2     O2 HGB,VENOUS CORD 02/26/2021 36 2     pH, Cord Art 02/26/2021 7 139*    pCO2, Cord Art 02/26/2021 60 4*    pO2, Cord Art 02/26/2021 18 4     HCO3, Cord Art 02/26/2021 20 1     Base Exc, Cord Art 02/26/2021 -9 9*    O2 Content, Cord Art 02/26/2021 6 9     O2 Hgb, Arterial Cord 02/26/2021 30 0     HIV-1/HIV-2 AB 08/19/2020 Non-Reactive     Hepatitis B Surface Ag 08/19/2020 non-reactive     RPR 08/19/2020 Non-Reactive     External Rubella IGG Wes* 08/19/2020 immune     WBC 02/27/2021 23 21*    RBC 02/27/2021 2 78*    Hemoglobin 02/27/2021 8 4*    Hematocrit 02/27/2021 25 1*    MCV 02/27/2021 90     MCH 02/27/2021 30 2     MCHC 02/27/2021 33 5     RDW 02/27/2021 15 3*    MPV 02/27/2021 10 8     Platelets 16/41/9473 231     nRBC 02/27/2021 0     Neutrophils Relative 02/27/2021 73     Immat GRANS % 02/27/2021 1     Lymphocytes Relative 02/27/2021 13*    Monocytes Relative 02/27/2021 13*    Eosinophils Relative 02/27/2021 0     Basophils Relative 02/27/2021 0     Neutrophils Absolute 02/27/2021 16 80*    Immature Grans Absolute 02/27/2021 0 23*    Lymphocytes Absolute 02/27/2021 2 94     Monocytes Absolute 02/27/2021 3 11*    Eosinophils Absolute 02/27/2021 0 07     Basophils Absolute 02/27/2021 0 06          Patient Active Problem List   Diagnosis    Arthralgia of multiple joints    Depression with anxiety    Fatigue    Irritable bowel syndrome    Recurrent UTI    Vitamin D deficiency    Migraine    Gastroenteritis    Acute midline thoracic back pain    Pregnancy with history of  section, antepartum    Multigravida of advanced maternal age in third trimester    Desires  (vaginal birth after ) trial    Prenatal care, subsequent pregnancy in third trimester    History of 2  sections    Elderly multigravida, third trimester    Polyhydramnios in third trimester    39 weeks gestation of pregnancy    Macrosomia affecting management of mother    Transverse presentation, antepartum    S/P  section        Assessment and Plan     Param Fraga is POD# 4 s/p RLTCS   She is recovering well and is stable     POD# 4   - Preop Hgb 11 --> post op Hgb 8 4   - SCD for DVT prophylaxis    - Continue current medications for pain management    - Encourage ambulation    - Encourage breast feeding     Disposition    - Anticipate discharge home on POD# 100 Elise Howell MD  3/2/2021  6:34 AM

## 2021-03-02 NOTE — LACTATION NOTE
This note was copied from a baby's chart  Checked on family prior to discharge  Jean used the SNS at the breast one additional time since assessment yesterday  She says breastfeeding is going very well and that her milk is "in"  Jean wanted to know about her baby's weight changes  Showed her the  weight trend for her baby that shows a slight increase in weight since yesterday  She says she has no other questions at this time  Encouraged Jean and Jerome Jones (Chelsea's significant other) to call with any additional needs for education or instruction

## 2021-03-04 ENCOUNTER — APPOINTMENT (EMERGENCY)
Dept: RADIOLOGY | Facility: HOSPITAL | Age: 36
End: 2021-03-04
Payer: COMMERCIAL

## 2021-03-04 ENCOUNTER — APPOINTMENT (EMERGENCY)
Dept: CT IMAGING | Facility: HOSPITAL | Age: 36
End: 2021-03-04
Payer: COMMERCIAL

## 2021-03-04 ENCOUNTER — HOSPITAL ENCOUNTER (OUTPATIENT)
Facility: HOSPITAL | Age: 36
Setting detail: OBSERVATION
Discharge: HOME | End: 2021-03-05
Attending: EMERGENCY MEDICINE | Admitting: OBSTETRICS & GYNECOLOGY
Payer: COMMERCIAL

## 2021-03-04 ENCOUNTER — TELEPHONE (OUTPATIENT)
Dept: OBGYN CLINIC | Facility: CLINIC | Age: 36
End: 2021-03-04

## 2021-03-04 DIAGNOSIS — R00.1 SINUS BRADYCARDIA: ICD-10-CM

## 2021-03-04 DIAGNOSIS — R06.01 ORTHOPNEA: ICD-10-CM

## 2021-03-04 DIAGNOSIS — R06.00 DYSPNEA ON EXERTION: Primary | ICD-10-CM

## 2021-03-04 DIAGNOSIS — R60.9 FLUID RETENTION: ICD-10-CM

## 2021-03-04 LAB
ALBUMIN SERPL BCP-MCNC: 2.5 G/DL (ref 3.5–5)
ALP SERPL-CCNC: 99 U/L (ref 46–116)
ALT SERPL W P-5'-P-CCNC: 31 U/L (ref 12–78)
ANION GAP SERPL CALCULATED.3IONS-SCNC: 9 MMOL/L (ref 4–13)
AST SERPL W P-5'-P-CCNC: 43 U/L (ref 5–45)
BACTERIA UR QL AUTO: ABNORMAL /HPF
BASOPHILS # BLD AUTO: 0.09 THOUSANDS/ΜL (ref 0–0.1)
BASOPHILS NFR BLD AUTO: 1 % (ref 0–1)
BILIRUB SERPL-MCNC: 0.34 MG/DL (ref 0.2–1)
BILIRUB UR QL STRIP: NEGATIVE
BUN SERPL-MCNC: 14 MG/DL (ref 5–25)
CALCIUM ALBUM COR SERPL-MCNC: 9.1 MG/DL (ref 8.3–10.1)
CALCIUM SERPL-MCNC: 7.9 MG/DL (ref 8.3–10.1)
CHLORIDE SERPL-SCNC: 109 MMOL/L (ref 100–108)
CLARITY UR: ABNORMAL
CO2 SERPL-SCNC: 21 MMOL/L (ref 21–32)
COLOR UR: ABNORMAL
CREAT SERPL-MCNC: 0.67 MG/DL (ref 0.6–1.3)
D DIMER PPP FEU-MCNC: 3.58 UG/ML FEU
EOSINOPHIL # BLD AUTO: 0.18 THOUSAND/ΜL (ref 0–0.61)
EOSINOPHIL NFR BLD AUTO: 2 % (ref 0–6)
ERYTHROCYTE [DISTWIDTH] IN BLOOD BY AUTOMATED COUNT: 15.4 % (ref 11.6–15.1)
GFR SERPL CREATININE-BSD FRML MDRD: 113 ML/MIN/1.73SQ M
GLUCOSE SERPL-MCNC: 89 MG/DL (ref 65–140)
GLUCOSE UR STRIP-MCNC: NEGATIVE MG/DL
HCT VFR BLD AUTO: 29.9 % (ref 34.8–46.1)
HGB BLD-MCNC: 9.2 G/DL (ref 11.5–15.4)
HGB UR QL STRIP.AUTO: ABNORMAL
IMM GRANULOCYTES # BLD AUTO: 0.21 THOUSAND/UL (ref 0–0.2)
IMM GRANULOCYTES NFR BLD AUTO: 2 % (ref 0–2)
KETONES UR STRIP-MCNC: ABNORMAL MG/DL
LEUKOCYTE ESTERASE UR QL STRIP: ABNORMAL
LYMPHOCYTES # BLD AUTO: 3.17 THOUSANDS/ΜL (ref 0.6–4.47)
LYMPHOCYTES NFR BLD AUTO: 31 % (ref 14–44)
MCH RBC QN AUTO: 28.9 PG (ref 26.8–34.3)
MCHC RBC AUTO-ENTMCNC: 30.8 G/DL (ref 31.4–37.4)
MCV RBC AUTO: 94 FL (ref 82–98)
MONOCYTES # BLD AUTO: 0.83 THOUSAND/ΜL (ref 0.17–1.22)
MONOCYTES NFR BLD AUTO: 8 % (ref 4–12)
NEUTROPHILS # BLD AUTO: 5.65 THOUSANDS/ΜL (ref 1.85–7.62)
NEUTS SEG NFR BLD AUTO: 56 % (ref 43–75)
NITRITE UR QL STRIP: NEGATIVE
NON-SQ EPI CELLS URNS QL MICRO: ABNORMAL /HPF
NRBC BLD AUTO-RTO: 0 /100 WBCS
NT-PROBNP SERPL-MCNC: 449 PG/ML
PH UR STRIP.AUTO: 6.5 [PH]
PLACENTA IN STORAGE: NORMAL
PLATELET # BLD AUTO: 371 THOUSANDS/UL (ref 149–390)
PMV BLD AUTO: 10 FL (ref 8.9–12.7)
POTASSIUM SERPL-SCNC: 4.8 MMOL/L (ref 3.5–5.3)
PROT SERPL-MCNC: 6.8 G/DL (ref 6.4–8.2)
PROT UR STRIP-MCNC: >=300 MG/DL
RBC # BLD AUTO: 3.18 MILLION/UL (ref 3.81–5.12)
RBC #/AREA URNS AUTO: ABNORMAL /HPF
SODIUM SERPL-SCNC: 139 MMOL/L (ref 136–145)
SP GR UR STRIP.AUTO: >=1.03 (ref 1–1.03)
TROPONIN I SERPL-MCNC: <0.02 NG/ML
TSH SERPL DL<=0.05 MIU/L-ACNC: 4.3 UIU/ML (ref 0.36–3.74)
UROBILINOGEN UR QL STRIP.AUTO: 1 E.U./DL
WBC # BLD AUTO: 10.13 THOUSAND/UL (ref 4.31–10.16)
WBC #/AREA URNS AUTO: ABNORMAL /HPF

## 2021-03-04 PROCEDURE — 85379 FIBRIN DEGRADATION QUANT: CPT | Performed by: PHYSICIAN ASSISTANT

## 2021-03-04 PROCEDURE — 81001 URINALYSIS AUTO W/SCOPE: CPT | Performed by: PHYSICIAN ASSISTANT

## 2021-03-04 PROCEDURE — 83880 ASSAY OF NATRIURETIC PEPTIDE: CPT | Performed by: PHYSICIAN ASSISTANT

## 2021-03-04 PROCEDURE — 96374 THER/PROPH/DIAG INJ IV PUSH: CPT

## 2021-03-04 PROCEDURE — 84439 ASSAY OF FREE THYROXINE: CPT | Performed by: STUDENT IN AN ORGANIZED HEALTH CARE EDUCATION/TRAINING PROGRAM

## 2021-03-04 PROCEDURE — G1004 CDSM NDSC: HCPCS

## 2021-03-04 PROCEDURE — 84484 ASSAY OF TROPONIN QUANT: CPT | Performed by: PHYSICIAN ASSISTANT

## 2021-03-04 PROCEDURE — 36415 COLL VENOUS BLD VENIPUNCTURE: CPT | Performed by: PHYSICIAN ASSISTANT

## 2021-03-04 PROCEDURE — 84443 ASSAY THYROID STIM HORMONE: CPT | Performed by: STUDENT IN AN ORGANIZED HEALTH CARE EDUCATION/TRAINING PROGRAM

## 2021-03-04 PROCEDURE — 99243 OFF/OP CNSLTJ NEW/EST LOW 30: CPT | Performed by: OBSTETRICS & GYNECOLOGY

## 2021-03-04 PROCEDURE — 71275 CT ANGIOGRAPHY CHEST: CPT

## 2021-03-04 PROCEDURE — 93005 ELECTROCARDIOGRAM TRACING: CPT

## 2021-03-04 PROCEDURE — 71045 X-RAY EXAM CHEST 1 VIEW: CPT

## 2021-03-04 PROCEDURE — NC001 PR NO CHARGE: Performed by: OBSTETRICS & GYNECOLOGY

## 2021-03-04 PROCEDURE — 85025 COMPLETE CBC W/AUTO DIFF WBC: CPT | Performed by: PHYSICIAN ASSISTANT

## 2021-03-04 PROCEDURE — 87086 URINE CULTURE/COLONY COUNT: CPT | Performed by: PHYSICIAN ASSISTANT

## 2021-03-04 PROCEDURE — 99218 PR INITIAL OBSERVATION CARE/DAY 30 MINUTES: CPT | Performed by: OBSTETRICS & GYNECOLOGY

## 2021-03-04 PROCEDURE — 80053 COMPREHEN METABOLIC PANEL: CPT | Performed by: PHYSICIAN ASSISTANT

## 2021-03-04 PROCEDURE — 99285 EMERGENCY DEPT VISIT HI MDM: CPT

## 2021-03-04 PROCEDURE — 99285 EMERGENCY DEPT VISIT HI MDM: CPT | Performed by: PHYSICIAN ASSISTANT

## 2021-03-04 RX ORDER — ACETAMINOPHEN 325 MG/1
650 TABLET ORAL EVERY 6 HOURS PRN
Status: DISCONTINUED | OUTPATIENT
Start: 2021-03-04 | End: 2021-03-05 | Stop reason: HOSPADM

## 2021-03-04 RX ORDER — IBUPROFEN 600 MG/1
600 TABLET ORAL EVERY 6 HOURS SCHEDULED
Status: DISCONTINUED | OUTPATIENT
Start: 2021-03-05 | End: 2021-03-05

## 2021-03-04 RX ORDER — OXYCODONE HYDROCHLORIDE 5 MG/1
5 TABLET ORAL EVERY 4 HOURS PRN
Status: DISCONTINUED | OUTPATIENT
Start: 2021-03-04 | End: 2021-03-05

## 2021-03-04 RX ORDER — CALCIUM CARBONATE 200(500)MG
1000 TABLET,CHEWABLE ORAL
Status: DISCONTINUED | OUTPATIENT
Start: 2021-03-04 | End: 2021-03-05 | Stop reason: HOSPADM

## 2021-03-04 RX ORDER — FUROSEMIDE 10 MG/ML
40 INJECTION INTRAMUSCULAR; INTRAVENOUS ONCE
Status: COMPLETED | OUTPATIENT
Start: 2021-03-04 | End: 2021-03-04

## 2021-03-04 RX ADMIN — FUROSEMIDE 40 MG: 10 INJECTION, SOLUTION INTRAVENOUS at 19:21

## 2021-03-04 RX ADMIN — IOHEXOL 85 ML: 350 INJECTION, SOLUTION INTRAVENOUS at 15:30

## 2021-03-04 NOTE — TELEPHONE ENCOUNTER
PO6 C/S c/o tightness in chest exacerbated while lying down, posterior left shoulder pain  during tightness, increase in leg swelling, dull headache, occasional pounding in ears  Leg edema is no different than she noted in other pregnancies  Dull headache - motrin and tylenol helps but if lingers she feels lightheaded  Denies epigastric pain or palpations  She is getting some rest  Breast feeding is going well  Incision is doing good  Vaginal bleeding normal   States these things did not happen with other pregnancies  She does not have a BP cuff  Routing to provider for advice

## 2021-03-04 NOTE — TELEPHONE ENCOUNTER
Advised patient per Dr García Bang to please report to [facility]  Verbalized understanding

## 2021-03-04 NOTE — ED PROVIDER NOTES
History  Chief Complaint   Patient presents with    Chest Pain     Pt presents to the ED with chest tightness, some dizziness  Onset 2 days ago  S/p C section 21  Valiant Libman is a 39 y o  female who presents to the ED with complaints of chest tightness and SOB  Patient describes this as feeling "congestion" in her chest which radiates to her head  Patient describes the sensation of feeling like she is upside down  Patient underwent uncomplicated caesarian delivery on 21  Patient states while in the hospital she felt like she was having some left-sided flank/scapular pain  Patient did have a failed spinal block while in the hospital   Patient states this is her third  and she did have bilateral leg swelling with her prior surgeries  Patient states she has been having chest tightness which is concerning and she has been feeling SOB which is worse with exertion and laying flat  Patient states she has been sitting/sleeping upright to alleviate discomfort  Patient states today she was experiencing a right-sided dull headache  Patient reports polyhydramnios in pregnancy  Patient admits to mild vaginal bleeding  History provided by:  Patient  Shortness of Breath  Severity:  Moderate  Duration:  1 week  Timing:  Intermittent  Progression:  Waxing and waning  Context: activity    Associated symptoms: headaches and PND    Associated symptoms: no abdominal pain, no chest pain, no cough, no diaphoresis, no ear pain, no fever, no neck pain, no rash, no sore throat, no vomiting and no wheezing    Risk factors: recent surgery        Prior to Admission Medications   Prescriptions Last Dose Informant Patient Reported? Taking?    Ferrous Sulfate (IRON PO) 3/3/2021 at Unknown time Self Yes Yes   Sig: Take 100 mg of iron by mouth daily   MAGNESIUM PO 3/3/2021 at Unknown time Self Yes Yes   Sig: Take by mouth   Prenat w/o O-YN-Uuakqmx-FA-DHA (PNV-DHA PO) 3/3/2021 at Unknown time Self Yes Yes   Sig: Take by mouth   acetaminophen (TYLENOL) 325 mg tablet 3/4/2021 at Unknown time  No Yes   Sig: Take 2 tablets (650 mg total) by mouth every 6 (six) hours as needed for headaches   calcium carbonate (TUMS) 500 mg chewable tablet Not Taking at Unknown time Self Yes No   Sig: Chew 2 tablets daily after dinner   hydrocortisone (ANUSOL-HC) 2 5 % rectal cream Not Taking at Unknown time Self No No   Sig: Apply topically 2 (two) times a day   Patient not taking: Reported on 3/4/2021   ibuprofen (MOTRIN) 600 mg tablet Past Week at Unknown time  No Yes   Sig: Take 1 tablet (600 mg total) by mouth every 6 (six) hours   lidocaine (LMX) 4 % cream Not Taking at Unknown time Self No No   Sig: Apply topically as needed for mild pain   Patient not taking: Reported on 3/4/2021   oxyCODONE (ROXICODONE) 5 mg immediate release tablet 3/3/2021 at Unknown time  No Yes   Sig: Take 1 tablet (5 mg total) by mouth every 4 (four) hours as needed for moderate pain for up to 10 daysMax Daily Amount: 30 mg      Facility-Administered Medications: None       Past Medical History:   Diagnosis Date    Abnormal Pap smear of cervix     , spontaneous complete 2017    Transitioned From: Spontaneous , incomplete    HPV (human papilloma virus) infection     UTI (urinary tract infection) 2016    Varicella     vaccine       Past Surgical History:   Procedure Laterality Date     SECTION       CPD?     COLPOSCOPY      2016    MT  DELIVERY ONLY N/A 2018    Procedure:  SECTION () REPEAT;  Surgeon: Jim Muse MD;  Location: [facility];  Service: Obstetrics    MT  DELIVERY ONLY N/A 2021    Procedure:  SECTION () REPEAT;  Surgeon: Rey Newman DO;  Location: [facility] ;  Service: Obstetrics    WISDOM TOOTH EXTRACTION Bilateral        Family History   Problem Relation Age of Onset    Hyperlipidemia Father     Heart disease Father         defect    Heart disease Maternal Grandmother         defect    Cancer Maternal Grandfather         lung    Heart disease Maternal Grandfather         defect    COPD Maternal Grandfather         emphysema    Cancer Paternal Grandmother         lung    Hyperlipidemia Paternal Grandfather     Heart disease Paternal Grandfather         defect    No Known Problems Mother     No Known Problems Brother     No Known Problems Son     No Known Problems Son     Migraines Maternal Aunt     Migraines Paternal Aunt      I have reviewed and agree with the history as documented  E-Cigarette/Vaping    E-Cigarette Use Never User      E-Cigarette/Vaping Substances    Nicotine No     THC No     CBD No     Flavoring No     Other No     Unknown No      Social History     Tobacco Use    Smoking status: Never Smoker    Smokeless tobacco: Never Used   Substance Use Topics    Alcohol use: Not Currently     Alcohol/week: 2 0 standard drinks     Types: 2 Cans of beer per week    Drug use: No       Review of Systems   Constitutional: Negative for appetite change, chills, diaphoresis, fever and unexpected weight change  HENT: Negative for congestion, drooling, ear pain, rhinorrhea, sore throat, trouble swallowing and voice change  Eyes: Negative for pain, discharge, redness and visual disturbance  Respiratory: Positive for chest tightness and shortness of breath  Negative for cough, wheezing and stridor  Cardiovascular: Positive for leg swelling and PND  Negative for chest pain and palpitations  Gastrointestinal: Negative for abdominal pain, blood in stool, constipation, diarrhea, nausea and vomiting  Genitourinary: Negative for dysuria, flank pain, frequency, hematuria and urgency  Musculoskeletal: Negative for gait problem, joint swelling, neck pain and neck stiffness  Skin: Negative for color change and rash  Neurological: Positive for headaches  Negative for dizziness, seizures and light-headedness  Physical Exam  Physical Exam  Vitals signs and nursing note reviewed  Constitutional:       Appearance: She is well-developed  HENT:      Head: Normocephalic and atraumatic  Nose: Nose normal    Eyes:      Conjunctiva/sclera: Conjunctivae normal       Pupils: Pupils are equal, round, and reactive to light  Cardiovascular:      Rate and Rhythm: Normal rate and regular rhythm  Pulmonary:      Effort: Pulmonary effort is normal       Breath sounds: Decreased breath sounds present  Abdominal:      General: Abdomen is flat  Bowel sounds are normal       Palpations: Abdomen is soft  Tenderness: There is no abdominal tenderness  Comments: Incision c/d/i   Musculoskeletal: Normal range of motion  Right lower le+ Edema present  Left lower le+ Edema present  Skin:     General: Skin is warm and dry  Capillary Refill: Capillary refill takes less than 2 seconds  Neurological:      Mental Status: She is alert and oriented to person, place, and time           Vital Signs  ED Triage Vitals   Temperature Pulse Respirations Blood Pressure SpO2   21 1351 21 1350 21 1350 21 1350 21 1350   98 7 °F (37 1 °C) 63 16 117/63 99 %      Temp Source Heart Rate Source Patient Position - Orthostatic VS BP Location FiO2 (%)   21 1351 21 1350 21 1350 21 1350 --   Oral Monitor Sitting Right arm       Pain Score       21 1350       5           Vitals:    21 1800 21 1830 21 1900 21 1925   BP: 129/80 140/71 119/70 113/69   Pulse: (!) 42 (!) 44 (!) 50 (!) 45   Patient Position - Orthostatic VS: Lying Lying Sitting Sitting         Visual Acuity      ED Medications  Medications   iohexol (OMNIPAQUE) 350 MG/ML injection (MULTI-DOSE) 85 mL (85 mL Intravenous Given 3/4/21 153)   furosemide (LASIX) injection 40 mg (40 mg Intravenous Given 3/4/21 1921)       Diagnostic Studies  Results Reviewed     Procedure Component Value Units Date/Time    TSH, 3rd generation with Free T4 reflex [[id number]]  (Abnormal) Collected: 03/04/21 1411    Lab Status: Final result Specimen: Blood from Arm, Right Updated: 03/04/21 1943     TSH 3RD GENERATON 4 303 uIU/mL     Narrative:      Patients undergoing fluorescein dye angiography may retain small amounts of fluorescein in the body for 48-72 hours post procedure  Samples containing fluorescein can produce falsely depressed TSH values  If the patient had this procedure,a specimen should be resubmitted post fluorescein clearance  T4, free [id number] Collected: 03/04/21 1411    Lab Status: In process Specimen: Blood from Arm, Right Updated: 03/04/21 1943    Urine Microscopic [[id number]]  (Abnormal) Collected: 03/04/21 1440    Lab Status: Final result Specimen: Urine, Clean Catch Updated: 03/04/21 1525     RBC, UA Innumerable /hpf      WBC, UA 20-30 /hpf      Epithelial Cells Occasional /hpf      Bacteria, UA Occasional /hpf     Urine culture [[id number]] Collected: 03/04/21 1440    Lab Status:  In process Specimen: Urine, Clean Catch Updated: 03/04/21 1524    UA w Reflex to Microscopic w Reflex to Culture [[id number]]  (Abnormal) Collected: 03/04/21 1440    Lab Status: Final result Specimen: Urine, Clean Catch Updated: 03/04/21 1501     Color, UA Red     Clarity, UA Cloudy     Specific Gravity, UA >=1 030     pH, UA 6 5     Leukocytes, UA Trace     Nitrite, UA Negative     Protein, UA >=300 mg/dl      Glucose, UA Negative mg/dl      Ketones, UA Trace mg/dl      Urobilinogen, UA 1 0 E U /dl      Bilirubin, UA Negative     Blood, UA Large    NT-BNP PRO [[id number]]  (Abnormal) Collected: 03/04/21 1411    Lab Status: Final result Specimen: Blood from Arm, Right Updated: 03/04/21 1444     NT-proBNP 449 pg/mL     Comprehensive metabolic panel [[id number]]  (Abnormal) Collected: 03/04/21 1411    Lab Status: Final result Specimen: Blood from Arm, Right Updated: 03/04/21 1444     Sodium 139 mmol/L Potassium 4 8 mmol/L      Chloride 109 mmol/L      CO2 21 mmol/L      ANION GAP 9 mmol/L      BUN 14 mg/dL      Creatinine 0 67 mg/dL      Glucose 89 mg/dL      Calcium 7 9 mg/dL      Corrected Calcium 9 1 mg/dL      AST 43 U/L      ALT 31 U/L      Alkaline Phosphatase 99 U/L      Total Protein 6 8 g/dL      Albumin 2 5 g/dL      Total Bilirubin 0 34 mg/dL      eGFR 113 ml/min/1 73sq m     Narrative:      National Kidney Disease Foundation guidelines for Chronic Kidney Disease (CKD):     Stage 1 with normal or high GFR (GFR > 90 mL/min/1 73 square meters)    Stage 2 Mild CKD (GFR = 60-89 mL/min/1 73 square meters)    Stage 3A Moderate CKD (GFR = 45-59 mL/min/1 73 square meters)    Stage 3B Moderate CKD (GFR = 30-44 mL/min/1 73 square meters)    Stage 4 Severe CKD (GFR = 15-29 mL/min/1 73 square meters)    Stage 5 End Stage CKD (GFR <15 mL/min/1 73 square meters)  Note: GFR calculation is accurate only with a steady state creatinine    Troponin I [[id number]]  (Normal) Collected: 03/04/21 1411    Lab Status: Final result Specimen: Blood from Arm, Right Updated: 03/04/21 1439     Troponin I <0 02 ng/mL     D-Dimer [[id number]]  (Abnormal) Collected: 03/04/21 1411    Lab Status: Final result Specimen: Blood from Arm, Right Updated: 03/04/21 1433     D-Dimer, Quant 3 58 ug/ml FEU     CBC and differential [[id number]]  (Abnormal) Collected: 03/04/21 1411    Lab Status: Final result Specimen: Blood from Arm, Right Updated: 03/04/21 1419     WBC 10 13 Thousand/uL      RBC 3 18 Million/uL      Hemoglobin 9 2 g/dL      Hematocrit 29 9 %      MCV 94 fL      MCH 28 9 pg      MCHC 30 8 g/dL      RDW 15 4 %      MPV 10 0 fL      Platelets 343 Thousands/uL      nRBC 0 /100 WBCs      Neutrophils Relative 56 %      Immat GRANS % 2 %      Lymphocytes Relative 31 %      Monocytes Relative 8 %      Eosinophils Relative 2 %      Basophils Relative 1 %      Neutrophils Absolute 5 65 Thousands/µL      Immature Grans Absolute 0 21 Thousand/uL      Lymphocytes Absolute 3 17 Thousands/µL      Monocytes Absolute 0 83 Thousand/µL      Eosinophils Absolute 0 18 Thousand/µL      Basophils Absolute 0 09 Thousands/µL                  CTA ED chest PE study   Final Result by Rohan Cruz DO ( [id number])      No pulmonary embolus or other acute abnormality identified  Workstation performed: [id number]         XR chest 1 view portable    (Results Pending)              Procedures  ECG 12 Lead Documentation Only    Date/Time: 3/4/2021 2:18 PM  Performed by: Steve Lemus PA-C  Authorized by: Linda Arora MD     Indications / Diagnosis:  Chest Tightness, SOB  ECG reviewed by me, the ED Provider: yes    Patient location:  ED  Previous ECG:     Previous ECG:  Unavailable  Rate:     ECG rate:  52    ECG rate assessment: bradycardic    Rhythm:     Rhythm: sinus bradycardia    QRS:     QRS axis:  Normal  ST segments:     ST segments:  Normal  T waves:     T waves: flattening      Flattening:  V2 and III  Comments:      No acute ST or T wave changes  QT/QTc 448/416  ED Course  ED Course as of Mar 04 2039   Thu Mar 04, 2021   1952 Case was discussed with OBGYN/MFM who recommend echocardiogram   Per discussion with Cardiology, patient is unable to obtain echocardiogram until tomorrow  OBGYN/MFM discussed this with the patient who is agreeable to IV Lasix and re-evaluation   Patient did ambulate to the bathroom without difficulty  Patient is feeling improved and did lay supine without feeling SOB                   HEART Risk Score      Most Recent Value   Heart Score Risk Calculator   History  0 Filed at: 2021   ECG  0 Filed at: 2021   Age  0 Filed at: 2021   Risk Factors  0 Filed at: 2021   Troponin  0 Filed at: 2021   HEART Score  0 Filed at: 2021              PERC Rule for PE      Most Recent Value   PERC Rule for PE   Age >=50  0 Filed at: 03/04/2021 1957   HR >=100  0 Filed at: 03/04/2021 1957   O2 Sat on room air < 95%  0 Filed at: 03/04/2021 1957   History of PE or DVT  0 Filed at: 03/04/2021 1957   Recent trauma or surgery  0 Filed at: 03/04/2021 1957   Hemoptysis  0 Filed at: 03/04/2021 1957   Exogenous estrogen  1 [Recent Pregnancy/Post-Partrum State] Filed at: 03/04/2021 1957   Unilateral leg swelling  0 Filed at: 03/04/2021 1957   PERC Rule for PE Results  1 Filed at: 03/04/2021 1957              SBIRT 22yo+      Most Recent Value   SBIRT (23 yo +)   In order to provide better care to our patients, we are screening all of our patients for alcohol and drug use  Would it be okay to ask you these screening questions? Yes Filed at: 03/04/2021 1454   Initial Alcohol Screen: US AUDIT-C    1  How often do you have a drink containing alcohol?  0 Filed at: 03/04/2021 1454   2  How many drinks containing alcohol do you have on a typical day you are drinking? 0 Filed at: 03/04/2021 1454   3a  Male UNDER 65: How often do you have five or more drinks on one occasion? 0 Filed at: 03/04/2021 1454   3b  FEMALE Any Age, or MALE 65+: How often do you have 4 or more drinks on one occassion? 0 Filed at: 03/04/2021 1454   Audit-C Score  0 Filed at: 03/04/2021 1454   ALEXIA: How many times in the past year have you    Used an illegal drug or used a prescription medication for non-medical reasons?   Never Filed at: 03/04/2021 1454          Wells' Criteria for PE      Most Recent Value   Wells' Criteria for PE   Clinical signs and symptoms of DVT  0 Filed at: 03/04/2021 0511   PE is primary diagnosis or equally likely  3 Filed at: 03/04/2021 1957   HR >100  0 Filed at: 03/04/2021 1957   Immobilization at least 3 days or Surgery in the previous 4 weeks  0 Filed at: 03/04/2021 1957   Previous, objectively diagnosed PE or DVT  0 Filed at: 03/04/2021 1957   Hemoptysis  0 Filed at: 03/04/2021 1957   Malignancy with treatment within 6 months or palliative  0 Filed at: 03/04/2021 1957   Wells' Criteria Total  3 Filed at: 03/04/2021 1957                [facility]  Number of Diagnoses or Management Options  Dyspnea on exertion: new and requires workup  Fluid retention: new and requires workup  Sinus bradycardia: new and requires workup  Diagnosis management comments: EKG significant for sinus bradycardia  CXR without acute findings  Lab significant for anemia (stable) elevated D-dimer and elevated BNP  Concern for fluid overload  UA with small LE, large blood (patient reports vaginal bleeding) and WBC  Will send urine culture  Patient does not have any UTI symptoms  CTA without pulmonary embolus or other acute abnormality identified  Case was discussed with OBGYN/MFM who did see the patient in the ED  Discussed with cardiology who recommends echocardiogram tomorrow  MFM recommends 1 dose of Lasix in the ED at this time  Patient is feeling improved but is willing to stay for further evaluation and echocardiogram  Discussed with OBGYN  We had a detailed discussion of the patient's condition and case, including need for admission   Accepts to his/her service   Bed request/bridging orders placed          Amount and/or Complexity of Data Reviewed  Clinical lab tests: reviewed and ordered  Tests in the radiology section of CPT®: ordered and reviewed  Review and summarize past medical records: yes  Discuss the patient with other providers: yes (Dr Gregory Wan - OBGYN)    Patient Progress  Patient progress: stable      Disposition  Final diagnoses:   Dyspnea on exertion   Fluid retention   Sinus bradycardia     Time reflects when diagnosis was documented in both MDM as applicable and the Disposition within this note     Time User Action Codes Description Comment    3/4/2021  7:51 PM Ethel Srinivasan Add [R06 00] Dyspnea on exertion     3/4/2021  7:51 PM Chandan Leonard [R60 9] Fluid retention     3/4/2021  7:51 PM Ethel Srinivasan Add [R00 1] Sinus bradycardia       ED Disposition     ED Disposition Condition Date/Time Comment    Admit Stable Thu Mar 4, 2021  8:37 PM Case was discussed with OBGYN and the patient's admission status was agreed to be Admission Status: observation status to the service of Dr Kiko Liu  Follow-up Information    None         Patient's Medications   Discharge Prescriptions    No medications on file     No discharge procedures on file      PDMP Review     None          ED Provider  Electronically Signed by           Anne Francois PA-C  03/04/21 2039

## 2021-03-05 ENCOUNTER — APPOINTMENT (OUTPATIENT)
Dept: NON INVASIVE DIAGNOSTICS | Facility: HOSPITAL | Age: 36
End: 2021-03-05
Payer: COMMERCIAL

## 2021-03-05 VITALS
HEIGHT: 64 IN | BODY MASS INDEX: 29.02 KG/M2 | RESPIRATION RATE: 17 BRPM | WEIGHT: 170 LBS | DIASTOLIC BLOOD PRESSURE: 58 MMHG | HEART RATE: 50 BPM | SYSTOLIC BLOOD PRESSURE: 109 MMHG | OXYGEN SATURATION: 98 % | TEMPERATURE: 97.7 F

## 2021-03-05 PROBLEM — O09.523 ELDERLY MULTIGRAVIDA, THIRD TRIMESTER: Status: RESOLVED | Noted: 2021-01-05 | Resolved: 2021-03-05

## 2021-03-05 PROBLEM — O09.523 MULTIGRAVIDA OF ADVANCED MATERNAL AGE IN THIRD TRIMESTER: Status: RESOLVED | Noted: 2020-08-24 | Resolved: 2021-03-05

## 2021-03-05 PROBLEM — O36.60X0 MACROSOMIA AFFECTING MANAGEMENT OF MOTHER: Status: RESOLVED | Noted: 2021-02-24 | Resolved: 2021-03-05

## 2021-03-05 PROBLEM — O40.3XX0 POLYHYDRAMNIOS IN THIRD TRIMESTER: Status: RESOLVED | Noted: 2021-02-16 | Resolved: 2021-03-05

## 2021-03-05 PROBLEM — O32.2XX0 TRANSVERSE PRESENTATION, ANTEPARTUM: Status: RESOLVED | Noted: 2021-02-24 | Resolved: 2021-03-05

## 2021-03-05 PROBLEM — Z34.83 PRENATAL CARE, SUBSEQUENT PREGNANCY IN THIRD TRIMESTER: Status: RESOLVED | Noted: 2020-12-21 | Resolved: 2021-03-05

## 2021-03-05 PROBLEM — Z3A.39 39 WEEKS GESTATION OF PREGNANCY: Status: RESOLVED | Noted: 2021-02-16 | Resolved: 2021-03-05

## 2021-03-05 PROBLEM — O34.219 PREGNANCY WITH HISTORY OF CESAREAN SECTION, ANTEPARTUM: Status: RESOLVED | Noted: 2020-07-20 | Resolved: 2021-03-05

## 2021-03-05 PROBLEM — O34.219 DESIRES VBAC (VAGINAL BIRTH AFTER CESAREAN) TRIAL: Status: RESOLVED | Noted: 2020-12-21 | Resolved: 2021-03-05

## 2021-03-05 PROBLEM — R06.01 ORTHOPNEA: Status: ACTIVE | Noted: 2021-03-05

## 2021-03-05 LAB
ATRIAL RATE: 52 BPM
BACTERIA UR CULT: NORMAL
P AXIS: 41 DEGREES
PR INTERVAL: 168 MS
QRS AXIS: 25 DEGREES
QRSD INTERVAL: 74 MS
QT INTERVAL: 448 MS
QTC INTERVAL: 416 MS
T WAVE AXIS: 45 DEGREES
T4 FREE SERPL-MCNC: 1.08 NG/DL (ref 0.76–1.46)
VENTRICULAR RATE: 52 BPM

## 2021-03-05 PROCEDURE — 99225 PR SBSQ OBSERVATION CARE/DAY 25 MINUTES: CPT | Performed by: OBSTETRICS & GYNECOLOGY

## 2021-03-05 PROCEDURE — 93010 ELECTROCARDIOGRAM REPORT: CPT | Performed by: INTERNAL MEDICINE

## 2021-03-05 PROCEDURE — 99244 OFF/OP CNSLTJ NEW/EST MOD 40: CPT | Performed by: INTERNAL MEDICINE

## 2021-03-05 PROCEDURE — 93306 TTE W/DOPPLER COMPLETE: CPT

## 2021-03-05 RX ORDER — FUROSEMIDE 40 MG/1
40 TABLET ORAL AS NEEDED
Qty: 10 TABLET | Refills: 0 | Status: SHIPPED | OUTPATIENT
Start: 2021-03-05 | End: 2021-03-22

## 2021-03-05 RX ORDER — OXYCODONE HYDROCHLORIDE 5 MG/1
5 TABLET ORAL EVERY 4 HOURS PRN
Status: DISCONTINUED | OUTPATIENT
Start: 2021-03-05 | End: 2021-03-05 | Stop reason: HOSPADM

## 2021-03-05 RX ORDER — IBUPROFEN 600 MG/1
600 TABLET ORAL EVERY 6 HOURS PRN
Status: DISCONTINUED | OUTPATIENT
Start: 2021-03-05 | End: 2021-03-05 | Stop reason: HOSPADM

## 2021-03-05 RX ADMIN — IBUPROFEN 600 MG: 600 TABLET ORAL at 06:09

## 2021-03-05 NOTE — UTILIZATION REVIEW
Initial Clinical Review    Admission: Date/Time/Statement:   Admission Orders (From admission, onward)     Ordered        03/04/21 2105  Place in Observation  Once                   Orders Placed This Encounter   Procedures    Place in Observation     Standing Status:   Standing     Number of Occurrences:   1     Order Specific Question:   Level of Care     Answer:   Med Surg [16]     Order Specific Question:   Bed request comments     Answer:   Chika Beaulieu 3rd floor     ED Arrival Information     Expected Arrival 70 [address] delia Ramirez of Arrival Escorted By Service Admission Type    - 3/4/2021 13:42 Urgent Walk-In Family Member OB/GYN Urgent    Arrival Complaint    chest tightness        Chief Complaint   Patient presents with    Chest Pain     Pt presents to the ED with chest tightness, some dizziness  Onset 2 days ago  S/p C section 2/26/21  Assessment/Plan:  40 yo female presented to ED from home as observation status for orthopnea X 2 day  Patient is POD # 7 form RTLCS and c/o chest pain and SOB  Patient states her SOB is worse with exertion and laying flat  C/o dull headache  On exam (+) 2 edema in bilateral lower extremities, and decreased breath sounds  Plan IV lasix, ECHO,monitor labs and supportive care     ED Triage Vitals   Temperature Pulse Respirations Blood Pressure SpO2   03/04/21 1351 03/04/21 1350 03/04/21 1350 03/04/21 1350 03/04/21 1350   98 7 °F (37 1 °C) 63 16 117/63 99 %      Temp Source Heart Rate Source Patient Position - Orthostatic VS BP Location FiO2 (%)   03/04/21 1351 03/04/21 1350 03/04/21 1350 03/04/21 1350 --   Oral Monitor Sitting Right arm       Pain Score       03/04/21 1350       5          Wt Readings from Last 1 Encounters:   03/04/21 77 1 kg (170 lb)     Additional Vital Signs:    Date/Time  Temp  Pulse  Resp  BP  MAP (mmHg)  SpO2  O2 Device  Cardiac (WDL)  Patient Position - Orthostatic VS   03/05/21 0847  97 7 °F (36 5 °C)  53Abnormal   18  117/70  --  99 %  None (Room air)  --  Sitting   03/05/21 0355  98 3 °F (36 8 °C)  61  16  102/52  --  95 %  None (Room air)  --  Lying   03/05/21 0015  98 °F (36 7 °C)  77   16  99/57  --  99 %  None (Room air)  --  Sitting   Pulse: pt up oob walking back from bathroom at 03/05/21 0015   03/04/21 2230  98 3 °F (36 8 °C)  46Abnormal    16  120/75  --  100 %  None (Room air)  WDL  Sitting   Pulse: b  andi notified at 03/04/21 2230 03/04/21 2212  --  45Abnormal   18  126/75  --  98 %  --  --  --   03/04/21 2114  --  53Abnormal   18  97/62  --  97 %  None (Room air)  --  --   03/04/21 1925  --  45Abnormal   20  113/69  86  97 %  None (Room air)  --  Sitting   03/04/21 1900  --  50Abnormal   20  119/70  89  97 %  --  --  Sitting   03/04/21 1830  --  44Abnormal   --  140/71  96  99 %  None (Room air)  --  Lying   03/04/21 1800  --  42Abnormal   --  129/80  100  98 %  None (Room air)  --  Lying   03/04/21 1730  --  44Abnormal   --  125/80  98  97 %  None (Room air)  --  Lying   03/04/21 1700  --  44Abnormal   --  123/73  92  96 %  None (Room air)  --  Lying   03/04/21 1630  --  42Abnormal   --  137/60  87  96 %  None (Room air)  --  Lying   03/04/21 1600  --  48Abnormal   --  115/74  90  97 %  None (Room air)  --  Lying   03/04/21 1515  --  50Abnormal   20  113/77  91  97 %  None (Room air)  --  Lying   03/04/21 1503  --  --  --  --  --  --  None (Room air)  --  --   03/04/21 1442  --  49Abnormal   20  124/73  94  98 %  None (Room air)           Pertinent Labs/Diagnostic Test Results:       Results from last 7 days   Lab Units 03/04/21  1411 02/27/21  0432 02/26/21  1106   WBC Thousand/uL 10 13 23 21* 13 72*   HEMOGLOBIN g/dL 9 2* 8 4* 11 0*   HEMATOCRIT % 29 9* 25 1* 33 7*   PLATELETS Thousands/uL 371 231 263   NEUTROS ABS Thousands/µL 5 65 16 80* 8 99*         Results from last 7 days   Lab Units 03/04/21  1411   SODIUM mmol/L 139   POTASSIUM mmol/L 4 8   CHLORIDE mmol/L 109*   CO2 mmol/L 21   ANION GAP mmol/L 9   BUN mg/dL 14   CREATININE mg/dL 0 67   EGFR ml/min/1 73sq m 113   CALCIUM mg/dL 7 9*     Results from last 7 days   Lab Units 21  1411   AST U/L 43   ALT U/L 31   ALK PHOS U/L 99   TOTAL PROTEIN g/dL 6 8   ALBUMIN g/dL 2 5*   TOTAL BILIRUBIN mg/dL 0 34         Results from last 7 days   Lab Units 21  1411   GLUCOSE RANDOM mg/dL 89     Results from last 7 days   Lab Units 21  1411   TROPONIN I ng/mL <0 02     Results from last 7 days   Lab Units 21  1411   D-DIMER QUANTITATIVE ug/ml FEU 3 58*         Results from last 7 days   Lab Units 21  1411   TSH 3RD GENERATON uIU/mL 4 303*     Results from last 7 days   Lab Units 21  1411   NT-PRO BNP pg/mL 449*     Results from last 7 days   Lab Units 21  1440   CLARITY UA  Cloudy   COLOR UA  Red   SPEC GRAV UA  >=1 030   PH UA  6 5   GLUCOSE UA mg/dl Negative   KETONES UA mg/dl Trace*   BLOOD UA  Large*   PROTEIN UA mg/dl >=300*   NITRITE UA  Negative   BILIRUBIN UA  Negative   UROBILINOGEN UA E U /dl 1 0   LEUKOCYTES UA  Trace*   WBC UA /hpf 20-30*   RBC UA /hpf Innumerable*   BACTERIA UA /hpf Occasional   EPITHELIAL CELLS WET PREP /hpf Occasional     CTA 21  No pulmonary embolus or other acute abnormality identified       CXR 21  NAD    EKG 21  Sinus bradycardia  Low voltage QRS  Borderline ECG    ED Treatment:   Medication Administration from 2021 1342 to 2021 2243       Date/Time Order Dose Route Action     2021 1530 iohexol (OMNIPAQUE) 350 MG/ML injection (MULTI-DOSE) 85 mL 85 mL Intravenous Given     2021 1921 furosemide (LASIX) injection 40 mg 40 mg Intravenous Given       2021 2133 calcium carbonate (TUMS) chewable tablet 1,000 mg 1,000 mg Oral Not Given     Past Medical History:   Diagnosis Date    Abnormal Pap smear of cervix     , spontaneous complete 2017    Transitioned From: Spontaneous , incomplete    HPV (human papilloma virus) infection     UTI (urinary tract infection) 9/12/2016    Varicella     vaccine     Present on Admission:   Orthopnea      Admitting Diagnosis: Chest tightness [R07 89]  Fluid retention [R60 9]  Sinus bradycardia [R00 1]  Dyspnea on exertion [R06 00]  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]  Age/Sex: 39 y o  female  Admission Orders:  Scheduled Medications:  calcium carbonate, 1,000 mg, Oral, After Dinner      Continuous IV Infusions:     PRN Meds:  acetaminophen, 650 mg, Oral, Q6H PRN  ibuprofen, 600 mg, Oral, Q6H PRN  oxyCODONE, 5 mg, Oral, Q4H PRN        IP CONSULT TO OB GYN  IP CONSULT TO PERINATOLOGY   SCD      Network Utilization Review Department  ATTENTION: Please call with any questions or concerns to [phone] and carefully listen to the prompts so that you are directed to the right person  All voicemails are confidential   Susi Mcgraw all requests for admission clinical reviews, approved or denied determinations and any other requests to dedicated fax number below belonging to the campus where the patient is receiving treatment   List of dedicated fax numbers for the Facilities:  [address] DENIALS (Administrative/Medical Necessity) [phone]   [address] (Maternity/NICU/Pediatrics) [phone]   [address] 40 [address] [facility] [address] Guille Chao 8446 (Chaz Wheeler "Leena" 103) [address] [address] Brooklynn Ford 1481 [address] [place] [address] [phone]

## 2021-03-05 NOTE — CONSULTS
CONSULTATION - [facility]  Gearline Notice 39 y o  female MRN: [id number]  Unit/Bed#: ED 23 Encounter: [id number]      SUBJECTIVE    Physician/Team requesting consult: Emergency Medicine  Reason for Consult / Principal Problem: shortness of breath and chest tightness    HPI: Gearline Notice is a 39 y o  [id number] s/p RLTCS on  who presents with shortness of breath and chest tightness and headache  Patient states that after being discharged, she always had left back pain but was reassured that this was normal  However, when patient went to lay down, she felt chest tightness and shortness of breath as if her "body was upside down " Patient also reports the tightness goes up to her neck as well  Patient otherwise denies any abdominal pain or leg tenderness  Patient has bilateral leg swelling but was not too concerned about this as she was regarding her chest tightness and shortness of breath  Patient is otherwise breastfeeding and has minimal lochia  Review of Systems   Constitutional: Negative for chills and fever  Eyes: Negative for visual disturbance  Respiratory: Positive for shortness of breath  Cardiovascular: Positive for chest pain and leg swelling  Gastrointestinal: Negative for abdominal pain, constipation, diarrhea, nausea and vomiting  Genitourinary: Negative for pelvic pain, urgency, vaginal bleeding, vaginal discharge and vaginal pain  Neurological: Positive for headaches  Past Medical History:   Diagnosis Date    Abnormal Pap smear of cervix     , spontaneous complete 2017    Transitioned From: Spontaneous , incomplete    HPV (human papilloma virus) infection     UTI (urinary tract infection) 2016    Varicella     vaccine     Past Surgical History:   Procedure Laterality Date     SECTION       CPD?     COLPOSCOPY      2016    DE  DELIVERY ONLY N/A 2018    Procedure:  SECTION () REPEAT;  Surgeon: Adama suh Emily Rangel MD;  Location: [facility] LD;  Service: Obstetrics    KS  DELIVERY ONLY N/A 2021    Procedure:  SECTION () REPEAT;  Surgeon: Ashley Roe DO;  Location: [facility] LD;  Service: Obstetrics    WISDOM TOOTH EXTRACTION Bilateral      OB History    Para Term  AB Living   4 3 3 0 1 3   SAB TAB Ectopic Multiple Live Births   1 0 0 0 3      # Outcome Date GA Lbr Tray/2nd Weight Sex Delivery Anes PTL Lv   4 Term 21 39w4d  4224 g (9 lb 5 oz) F CS-LTranv Spinal N RIYA   3 Term 18 40w3d 10:20 / 04:02 3714 g (8 lb 3 oz) M CS-LTranv EPI N RIYA      Complications: Cephalopelvic Disproportion, Failure to Progress in Second Stage   2 SAB 17     SAB      1 Term 12/10/14 39w3d  3544 g (7 lb 13 oz) M CS-LTranv EPI N RIYA      Complications: Cephalopelvic Disproportion     Family History   Problem Relation Age of Onset    Hyperlipidemia Father     Heart disease Father         defect    Heart disease Maternal Grandmother         defect    Cancer Maternal Grandfather         lung    Heart disease Maternal Grandfather         defect    COPD Maternal Grandfather         emphysema    Cancer Paternal Grandmother         lung    Hyperlipidemia Paternal Grandfather     Heart disease Paternal Grandfather         defect    No Known Problems Mother     No Known Problems Brother     No Known Problems Son     No Known Problems Son     Migraines Maternal Aunt     Migraines Paternal Aunt      Social History   Social History     Substance and Sexual Activity   Alcohol Use Not Currently    Alcohol/week: 2 0 standard drinks    Types: 2 Cans of beer per week     Social History     Substance and Sexual Activity   Drug Use No     Social History     Tobacco Use   Smoking Status Never Smoker   Smokeless Tobacco Never Used       Meds/Allergies   No current facility-administered medications for this encounter          No Known Allergies    Objective   Vitals:  Vitals:    21 1800 03/04/21 1830 03/04/21 1900 03/04/21 1925   BP: 129/80 140/71 119/70 113/69   BP Location: Right arm Right arm Right arm Right arm   Pulse: (!) 42 (!) 44 (!) 50 (!) 45   Resp:   20 20   Temp:       TempSrc:       SpO2: 98% 99% 97% 97%       No intake or output data in the 24 hours ending 03/04/21 1940    Physical Exam  Vitals signs and nursing note reviewed  Constitutional:       Appearance: She is well-developed  Neck:      Thyroid: No thyroid mass or thyroid tenderness  Vascular: No JVD  Cardiovascular:      Rate and Rhythm: Normal rate and regular rhythm  Heart sounds: No murmur  No gallop  Pulmonary:      Effort: Pulmonary effort is normal       Breath sounds: Normal breath sounds  No wheezing or rales  Chest:      Chest wall: No tenderness  Abdominal:      General: There is no distension  Palpations: Abdomen is soft  Tenderness: There is no abdominal tenderness  There is no guarding or rebound  Comments: Incision is c/d/i   Musculoskeletal: Normal range of motion  General: No tenderness  Comments: +2 nonpitting edema    Neurological:      General: No focal deficit present  Mental Status: She is alert and oriented to person, place, and time     Psychiatric:         Mood and Affect: Mood normal          Behavior: Behavior normal          LAB RESULTS  Admission on 03/04/2021   Component Date Value    WBC 03/04/2021 10 13     RBC 03/04/2021 3 18*    Hemoglobin 03/04/2021 9 2*    Hematocrit 03/04/2021 29 9*    MCV 03/04/2021 94     MCH 03/04/2021 28 9     MCHC 03/04/2021 30 8*    RDW 03/04/2021 15 4*    MPV 03/04/2021 10 0     Platelets 68/75/3145 371     nRBC 03/04/2021 0     Neutrophils Relative 03/04/2021 56     Immat GRANS % 03/04/2021 2     Lymphocytes Relative 03/04/2021 31     Monocytes Relative 03/04/2021 8     Eosinophils Relative 03/04/2021 2     Basophils Relative 03/04/2021 1     Neutrophils Absolute 03/04/2021 5 65     Immature Grans Absolute 2021 0 21*    Lymphocytes Absolute 2021 3 17     Monocytes Absolute 2021 0 83     Eosinophils Absolute 2021 0 18     Basophils Absolute 2021 0 09     Sodium 2021 139     Potassium 2021 4 8     Chloride 2021 109*    CO2 2021 21     ANION GAP 2021 9     BUN 2021 14     Creatinine 2021 0 67     Glucose 2021 89     Calcium 2021 7 9*    Corrected Calcium 2021 9 1     AST 2021 43     ALT 2021 31     Alkaline Phosphatase 2021 99     Total Protein 2021 6 8     Albumin 2021 2 5*    Total Bilirubin 2021 0 34     eGFR 2021 113     Troponin I 2021 <0 02     D-Dimer, Quant 2021 3 58*    NT-proBNP 2021 449*    Color, UA 2021 Red     Clarity, UA 2021 Cloudy     Specific Gravity, UA 2021 >=1 030     pH, UA 2021 6 5     Leukocytes, UA 2021 Trace*    Nitrite, UA 2021 Negative     Protein, UA 2021 >=300*    Glucose, UA 2021 Negative     Ketones, UA 2021 Trace*    Urobilinogen, UA 2021 1 0     Bilirubin, UA 2021 Negative     Blood, UA 2021 Large*    RBC, UA 2021 Innumerable*    WBC, UA 2021 20-30*    Epithelial Cells 2021 Occasional     Bacteria, UA 2021 Occasional        IMAGING  CTA - CHEST WITH IV CONTRAST - PULMONARY ANGIOGRAM     INDICATION:   PE suspected, intermediate prob, positive D-dimer  Chest Tightness, SOB, Recent , Elevated D-Dimer      COMPARISON: Chest radiograph from earlier the same day     TECHNIQUE: CTA examination of the chest was performed using angiographic technique according to a protocol specifically tailored to evaluate for pulmonary embolism  Axial, sagittal, and coronal 2D reformatted images were created from the source data and   submitted for interpretation    In addition, coronal 3D MIP postprocessing was performed on the acquisition scanner        Radiation dose length product (DLP) for this visit:  183 mGy-cm   This examination, like all CT scans performed in the [facility], was performed utilizing techniques to minimize radiation dose exposure, including the use of iterative   reconstruction and automated exposure control      IV Contrast:  85 mL of iohexol (OMNIPAQUE)     FINDINGS:     PULMONARY ARTERIAL TREE:  No pulmonary embolus is seen       LUNGS:  Lungs are clear  There is no tracheal or endobronchial lesion      PLEURA:  Unremarkable      HEART/GREAT VESSELS:  Unremarkable for patient's age      MEDIASTINUM AND TERRI:  Unremarkable      CHEST WALL AND LOWER NECK:   Unremarkable      VISUALIZED STRUCTURES IN THE UPPER ABDOMEN:  Unremarkable      OSSEOUS STRUCTURES:  No acute fracture or destructive osseous lesion      IMPRESSION:     No pulmonary embolus or other acute abnormality identified      Maria De Jesus Arvizu PA-C     3/4/2021  4:10 PM  ECG 12 Lead Documentation Only    Date/Time: 3/4/2021 2:18 PM  Performed by: Maria De Jesus Arvizu PA-C  Authorized by: Alana Mock MD     Indications / Diagnosis:  Chest Tightness, SOB  ECG reviewed by me, the ED Provider: yes    Patient location:  ED  Previous ECG:     Previous ECG:  Unavailable  Rate:     ECG rate:  52    ECG rate assessment: bradycardic    Rhythm:     Rhythm: sinus bradycardia    QRS:     QRS axis:  Normal  ST segments:     ST segments:  Normal  T waves:     T waves: flattening      Flattening:  V2 and III  Comments:      No acute ST or T wave changes  QT/QTc 448/416  Assessment/Plan     Assessment:  Patient is a 40 yo [id number] who is here with shortness of breath and chest tightnesss    Plan:  1   Shortness of breath/chest tightness   -troponin, CTA, EKG, and CXR wnl   -CMP/CBC wnl; Bps wnl - no concern for preeclampsia   -ProBNP: 449   -discussed case with cardiology who recommends ECHO tomorrow but not stat   -Per MFM, will check TSH and order IV Lasix to see if symptoms improve  2  S/p RLTCS   -motrin, tylenol, roxicodone prn   -breast pump  3  FEN   -cardiac diet    Discussed with patient that if her symptoms improve, will consider doing ECHO in the outpatient setting  However, if symptoms does not improve, would recommend observation and check ECHO tomorrow AM  Patient was concerned about being separate from her baby  Will discussed with postpartum nursing staff if patient ends up being admitted      Discussed with Dr Contreras Michele  3/4/2021  7:40 PM

## 2021-03-05 NOTE — CONSULTS
Consultation - Cardiology   Ellie Fraga 39 y o  female MRN: [id number]  Unit/Bed#: -01 Encounter: [id number]    Assessment/Plan     Assessment:  1  Shortness of breath/chest pressure  Given patient's symptomology, time of onset and resolution with diuretic, this is likely secondary to patient's volume overload status  Patient's echo did not show any evidence of cardiomyopathy with reduced EF, making peripartum cardiomyopathy less likely  Patient's troponin was negative x3, BNP was slightly elevated, however could be due to volume overload status  2  Abnormal Thyroid level  Likely secondary to normal postpartum physiology  Would consider rechecking thyroid levels in 3 months, it is unlikely her thyroid levels are a factor in her presentation or her bradycardia  Plan:  · Patient is okay to be discharged from cardiac standpoint  · Would recommend patient maintain daily weights at home  · If patient gains more than 3 lb overnight and notices similar symptoms, she can take 1 dose of 40 mg of Lasix  · If patient has recurrence of similar symptoms, she can follow-up as an outpatient to the cardiology clinic  History of Present Illness   Physician Requesting Consult: Jone Hubbard DO  Reason for Consult / Principal Problem:  Shortness of breath/chest pressure  HPI: Arvella Jeans is a 39y o  year old female who presents with with worsening shortness of breath and chest tightness after having a successful recent  on 2021  Patient states after she went home she had noticed increased leg swelling, and shortness of breath  Patient states that at 1 point she was not able to sleep flat, and had ringing/throbbing in her ears  Patient states she has not had orthopnea or tinnitus like this on prior C-sections  Patient states that she did have significant leg swelling after her previous 2 C-sections, and has not required any medications    Patient denies any family history of cardiomyopathy, cardiac arrhythmias or sudden cardiac death  Patient does not have any social factors that could precipitate her current presentation  On presentation patient was given 40 mg of IV Lasix with significant output of 4 L  Today patient is asymptomatic, denies any orthopnea, PND or OLIVIA  Inpatient consult to Cardiology     Date/Time 3/5/2021 9:46 AM     Performed by  Ghulam Claros MD     Authorized by Geno Hernandez MD              Review of Systems   Constitutional: Negative for activity change, appetite change, chills and fever  Respiratory: Negative for cough, chest tightness and shortness of breath  Cardiovascular: Negative for chest pain and palpitations  Gastrointestinal: Negative for abdominal distention and abdominal pain  Skin: Negative for pallor and rash  Neurological: Negative for dizziness, syncope, facial asymmetry, weakness, light-headedness and headaches  Psychiatric/Behavioral: Negative  Historical Information   Past Medical History:   Diagnosis Date    Abnormal Pap smear of cervix     , spontaneous complete 2017    Transitioned From: Spontaneous , incomplete    HPV (human papilloma virus) infection     UTI (urinary tract infection) 2016    Varicella     vaccine     Past Surgical History:   Procedure Laterality Date     SECTION      2014 CPD?     COLPOSCOPY      2016    PA  DELIVERY ONLY N/A 2018    Procedure:  SECTION () REPEAT;  Surgeon: Jim Muse MD;  Location: [facility];  Service: Obstetrics    PA  DELIVERY ONLY N/A 2021    Procedure:  SECTION () REPEAT;  Surgeon: Rey Newman DO;  Location: [facility] ;  Service: Obstetrics    WISDOM TOOTH EXTRACTION Bilateral      Social History     Substance and Sexual Activity   Alcohol Use Not Currently    Alcohol/week: 2 0 standard drinks    Types: 2 Cans of beer per week     Social History Substance and Sexual Activity   Drug Use No     E-Cigarette/Vaping    E-Cigarette Use Never User      E-Cigarette/Vaping Substances    Nicotine No     THC No     CBD No     Flavoring No     Other No     Unknown No      Social History     Tobacco Use   Smoking Status Never Smoker   Smokeless Tobacco Never Used     Family History:   Family History   Problem Relation Age of Onset    Hyperlipidemia Father     Heart disease Father         defect    Heart disease Maternal Grandmother         defect    Cancer Maternal Grandfather         lung    Heart disease Maternal Grandfather         defect    COPD Maternal Grandfather         emphysema    Cancer Paternal Grandmother         lung    Hyperlipidemia Paternal Grandfather     Heart disease Paternal Grandfather         defect    No Known Problems Mother     No Known Problems Brother     No Known Problems Son     No Known Problems Son     Migraines Maternal Aunt     Migraines Paternal Aunt        Meds/Allergies   current meds:   Current Facility-Administered Medications   Medication Dose Route Frequency    acetaminophen (TYLENOL) tablet 650 mg  650 mg Oral Q6H PRN    calcium carbonate (TUMS) chewable tablet 1,000 mg  1,000 mg Oral After Dinner    ibuprofen (MOTRIN) tablet 600 mg  600 mg Oral Q6H PRN    oxyCODONE (ROXICODONE) IR tablet 5 mg  5 mg Oral Q4H PRN     No Known Allergies    Objective   Vitals: Blood pressure 117/70, pulse (!) 53, temperature 97 7 °F (36 5 °C), temperature source Axillary, resp  rate 18, height 5' 4" (1 626 m), weight 77 1 kg (170 lb), last menstrual period 05/25/2020, SpO2 99 %, currently breastfeeding    Orthostatic Blood Pressures      Most Recent Value   Blood Pressure  117/70 filed at 03/05/2021 0847   Patient Position - Orthostatic VS  Sitting filed at 03/05/2021 0847            Intake/Output Summary (Last 24 hours) at 3/5/2021 0946  Last data filed at 3/5/2021 0601  Gross per 24 hour   Intake --   Output 4150 ml Net -4150 ml       Invasive Devices     Peripheral Intravenous Line            Peripheral IV 03/04/21 Left Antecubital less than 1 day                Physical Exam  Vitals signs and nursing note reviewed  Constitutional:       General: She is not in acute distress  Appearance: Normal appearance  She is well-developed  She is not ill-appearing  HENT:      Head: Normocephalic and atraumatic  Eyes:      General:         Right eye: No discharge  Left eye: No discharge  Conjunctiva/sclera: Conjunctivae normal    Neck:      Musculoskeletal: Neck supple  Vascular: No JVD  Cardiovascular:      Rate and Rhythm: Normal rate and regular rhythm  Pulses: Normal pulses  Heart sounds: Normal heart sounds  No murmur  No gallop  Pulmonary:      Effort: Pulmonary effort is normal  No respiratory distress  Breath sounds: Normal breath sounds  No wheezing or rales  Abdominal:      General: Abdomen is flat  There is no distension  Palpations: Abdomen is soft  Tenderness: There is no abdominal tenderness  Musculoskeletal:         General: No swelling  Right lower leg: No edema  Left lower leg: No edema  Skin:     General: Skin is warm and dry  Capillary Refill: Capillary refill takes less than 2 seconds  Neurological:      General: No focal deficit present  Mental Status: She is alert and oriented to person, place, and time     Psychiatric:         Mood and Affect: Mood normal          Behavior: Behavior normal          Lab Results:   CBC with diff:   Results from last 7 days   Lab Units 03/04/21  1411   WBC Thousand/uL 10 13   RBC Million/uL 3 18*   HEMOGLOBIN g/dL 9 2*   HEMATOCRIT % 29 9*   MCV fL 94   MCH pg 28 9   MCHC g/dL 30 8*   RDW % 15 4*   MPV fL 10 0   PLATELETS Thousands/uL 371     CMP:   Results from last 7 days   Lab Units 03/04/21  1411   SODIUM mmol/L 139   POTASSIUM mmol/L 4 8   CHLORIDE mmol/L 109*   CO2 mmol/L 21   BUN mg/dL 14 CREATININE mg/dL 0 67   CALCIUM mg/dL 7 9*   AST U/L 43   ALT U/L 31   ALK PHOS U/L 99   EGFR ml/min/1 73sq m 113     Troponin:   0   Lab Value Date/Time    TROPONINI <0 02 03/04/2021 1411     BNP:   Results from last 7 days   Lab Units 03/04/21  1411   POTASSIUM mmol/L 4 8   CHLORIDE mmol/L 109*   CO2 mmol/L 21   BUN mg/dL 14   CREATININE mg/dL 0 67   CALCIUM mg/dL 7 9*   EGFR ml/min/1 73sq m 113     Imaging: I have personally reviewed pertinent reports      EKG:  Sinus bradycardia  VTE Prophylaxis: Sequential compression device Yvomarina Gilupe)     Code Status: Level 1 - Full Code  Advance Directive and Living Will:      Power of :    POLST:

## 2021-03-05 NOTE — PROGRESS NOTES
Progress Note - OB/GYN   Chalo Fernandez 39 y o  female MRN: [id number]  Unit/Bed#: -01 Encounter: [id number]    Assessment:  39 y o  [id number] PPD#7 s/p RLTCS, re-admitted with chest pain and shortness of breath    Plan:  1  Chest pain / shortness of breath  - CTA, troponin, CXR negative  - ProBNP 466  - Echo today  - Symptoms improved with Lasix  - HR 60-70s since midnight, 40s on admission  - -120s / 50s-70s overnight  - PreE labs wnl  - TSH elevated 4 3    2  Routine postpartum care  - breastfeeding  - Encourage ambulation    Subjective/Objective   Chief Complaint:       Subjective:  Chalo Fernandez is well appearing and has no complaints at this time  She feels significantly better this morning  She denies any dizziness, nausea, vomiting, chest pain, shortness of breath, palpitations, or headaches  Pain: Well controlled with pain medication regimen  Tolerating PO: yes  Voiding: yes  Flatus: yes  BM: no  Ambulating: yes  Breastfeeding: yes  Chest pain: no  Shortness of breath: no  Leg pain: no  Lochia: Decreasing    Objective:     Vitals: Blood pressure 102/52, pulse 61, temperature 98 3 °F (36 8 °C), temperature source Oral, resp  rate 16, height 5' 4" (1 626 m), weight 77 1 kg (170 lb), last menstrual period 05/25/2020, SpO2 95 %, currently breastfeeding      Intake/Output Summary (Last 24 hours) at 3/5/2021 0655  Last data filed at 3/5/2021 0601  Gross per 24 hour   Intake --   Output 4150 ml   Net -4150 ml       Physical Exam:     General: NAD  Cardiovascular: RRR, no murmur, nl S1/S2   Lungs: CTAB, non-labored breathing   Abdomen: Soft, no distension/rebound/guarding/tenderness   Fundus: Firm, non-tender, fundus: -3 cm below the umbilicus   Incision: C/D/I  Lower Extremities: Non-tender    Lab, Imaging and other studies:     Recent Results (from the past 72 hour(s))   CBC and differential    Collection Time: 03/04/21  2:11 PM   Result Value Ref Range    WBC 10 13 4 31 - 10 16 Thousand/uL RBC 3 18 (L) 3 81 - 5 12 Million/uL    Hemoglobin 9 2 (L) 11 5 - 15 4 g/dL    Hematocrit 29 9 (L) 34 8 - 46 1 %    MCV 94 82 - 98 fL    MCH 28 9 26 8 - 34 3 pg    MCHC 30 8 (L) 31 4 - 37 4 g/dL    RDW 15 4 (H) 11 6 - 15 1 %    MPV 10 0 8 9 - 12 7 fL    Platelets 331 142 - 997 Thousands/uL    nRBC 0 /100 WBCs    Neutrophils Relative 56 43 - 75 %    Immat GRANS % 2 0 - 2 %    Lymphocytes Relative 31 14 - 44 %    Monocytes Relative 8 4 - 12 %    Eosinophils Relative 2 0 - 6 %    Basophils Relative 1 0 - 1 %    Neutrophils Absolute 5 65 1 85 - 7 62 Thousands/µL    Immature Grans Absolute 0 21 (H) 0 00 - 0 20 Thousand/uL    Lymphocytes Absolute 3 17 0 60 - 4 47 Thousands/µL    Monocytes Absolute 0 83 0 17 - 1 22 Thousand/µL    Eosinophils Absolute 0 18 0 00 - 0 61 Thousand/µL    Basophils Absolute 0 09 0 00 - 0 10 Thousands/µL   Comprehensive metabolic panel    Collection Time: 03/04/21  2:11 PM   Result Value Ref Range    Sodium 139 136 - 145 mmol/L    Potassium 4 8 3 5 - 5 3 mmol/L    Chloride 109 (H) 100 - 108 mmol/L    CO2 21 21 - 32 mmol/L    ANION GAP 9 4 - 13 mmol/L    BUN 14 5 - 25 mg/dL    Creatinine 0 67 0 60 - 1 30 mg/dL    Glucose 89 65 - 140 mg/dL    Calcium 7 9 (L) 8 3 - 10 1 mg/dL    Corrected Calcium 9 1 8 3 - 10 1 mg/dL    AST 43 5 - 45 U/L    ALT 31 12 - 78 U/L    Alkaline Phosphatase 99 46 - 116 U/L    Total Protein 6 8 6 4 - 8 2 g/dL    Albumin 2 5 (L) 3 5 - 5 0 g/dL    Total Bilirubin 0 34 0 20 - 1 00 mg/dL    eGFR 113 ml/min/1 73sq m   Troponin I    Collection Time: 03/04/21  2:11 PM   Result Value Ref Range    Troponin I <0 02 <=0 04 ng/mL   D-Dimer    Collection Time: 03/04/21  2:11 PM   Result Value Ref Range    D-Dimer, Quant 3 58 (H) <0 50 ug/ml FEU   NT-BNP PRO    Collection Time: 03/04/21  2:11 PM   Result Value Ref Range    NT-proBNP 449 (H) <125 pg/mL   TSH, 3rd generation with Free T4 reflex    Collection Time: 03/04/21  2:11 PM   Result Value Ref Range    TSH 3RD GENERATON 4 303 (H) 0 358 - 3 740 uIU/mL   T4, free    Collection Time: 03/04/21  2:11 PM   Result Value Ref Range    Free T4 1 08 0 76 - 1 46 ng/dL   UA w Reflex to Microscopic w Reflex to Culture    Collection Time: 03/04/21  2:40 PM    Specimen: Urine, Clean Catch   Result Value Ref Range    Color, UA Red     Clarity, UA Cloudy     Specific Gravity, UA >=1 030 1 003 - 1 030    pH, UA 6 5 4 5, 5 0, 5 5, 6 0, 6 5, 7 0, 7 5, 8 0    Leukocytes, UA Trace (A) Negative    Nitrite, UA Negative Negative    Protein, UA >=300 (A) Negative mg/dl    Glucose, UA Negative Negative mg/dl    Ketones, UA Trace (A) Negative mg/dl    Urobilinogen, UA 1 0 0 2, 1 0 E U /dl E U /dl    Bilirubin, UA Negative Negative    Blood, UA Large (A) Negative   Urine Microscopic    Collection Time: 03/04/21  2:40 PM   Result Value Ref Range    RBC, UA Innumerable (A) None Seen, 0-1, 1-2, 2-4, 0-5 /hpf    WBC, UA 20-30 (A) None Seen, 0-1, 1-2, 0-5, 2-4 /hpf    Epithelial Cells Occasional None Seen, Occasional /hpf    Bacteria, UA Occasional None Seen, Occasional /hpf     Meds:  calcium carbonate, 1,000 mg, Oral, After Dinner      acetaminophen, 650 mg, Q6H PRN  ibuprofen, 600 mg, Q6H PRN  oxyCODONE, 5 mg, Q4H PRN              Signature / Title: Ramsey Reddy MD, Ob/Gyn, PGY-2  Date: 3/5/2021  Time: 6:55 AM

## 2021-03-05 NOTE — UTILIZATION REVIEW
Notification of Observation Admission/Observation Authorization Request   This is a Notification of Observation Admission for [facility]  Be advised that this patient was admitted to our facility under Observation Status  Contact J Luis Metz at [phone] for additional admission information  [facility]  DEDICATED [phone]  Patient Name:   Jorge Luis Ortega   YOB: 1985       [address]   [address]:   [address]  Tax ID: [id number]  NPI: [id number] Attending Provider/NPI: Jaquelin Hearn [[id number]]   Place of Service Code: 25     Place of Service Name:  CPT Code for Observation:  On [address] / CPT [address]   Start Date: 3/4/2021     Discharge Date & Time: 3/5/2021  1:56 PM    Type of Admission: Observation Status Discharge Disposition   (if discharged): Home/Self Care   Patient Diagnoses: Chest tightness [R07 89]  Fluid retention [R60 9]  Sinus bradycardia [R00 1]  Dyspnea on exertion [R06 00]  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]     Orders: Admission Orders (From admission, onward)     Ordered        03/04/21 2105  Place in Observation  Once                    Assigned Utilization Review Contact: J Luis Metz  Utilization   Network Utilization Review Department  Phone: [phone]; Fax [phone]  Email: [email or web address]   ATTENTION PAYERS: Please call the assigned Utilization  directly with any questions or concerns ALL voicemails in the department are confidential  Send all requests for admission clinical reviews, approved or denied determinations and any other requests to dedicated fax number belonging to the campus where the patient is receiving treatment  Urine culture growing Enterobacter aerogenes, sensitive to the prescribed Bactrim DS. No indication for change in therapy at this time

## 2021-03-05 NOTE — CONSULTS
CONSULTATION - Gynecology   Zurdo Fraga 39 y o  female MRN: [id number]  Unit/Bed#: ED 23 Encounter: [id number]      SUBJECTIVE    Physician/Team requesting consult: Emergency Medicine  Reason for Consult / Principal Problem: shortness of breath and chest tightness    HPI: Elizabeth Willson is a 39 y o  [id number] s/p RLTCS on  who presents with shortness of breath and chest tightness and headache  Patient states that after being discharged, she always had left back pain but was reassured that this was normal  However, when patient went to lay down, she felt chest tightness and shortness of breath as if her "body was upside down " Patient also reports the tightness goes up to her neck as well  Patient otherwise denies any abdominal pain or leg tenderness  Patient has bilateral leg swelling but was not too concerned about this as she was regarding her chest tightness and shortness of breath  Patient is otherwise breastfeeding and has minimal lochia  Review of Systems   Constitutional: Negative for chills and fever  Eyes: Negative for visual disturbance  Respiratory: Positive for shortness of breath  Cardiovascular: Positive for chest pain and leg swelling  Gastrointestinal: Negative for abdominal pain, constipation, diarrhea, nausea and vomiting  Genitourinary: Negative for pelvic pain, urgency, vaginal bleeding, vaginal discharge and vaginal pain  Neurological: Positive for headaches  Past Medical History:   Diagnosis Date    Abnormal Pap smear of cervix     , spontaneous complete 2017    Transitioned From: Spontaneous , incomplete    HPV (human papilloma virus) infection     UTI (urinary tract infection) 2016    Varicella     vaccine     Past Surgical History:   Procedure Laterality Date     SECTION       CPD?     COLPOSCOPY      2016    [place]  DELIVERY ONLY N/A 2018    Procedure:  SECTION () REPEAT;  Surgeon: Calli Morrell MD;  [facility];  Service: Obstetrics    [place]  DELIVERY ONLY N/A 2021    Procedure:  SECTION () REPEAT;  Surgeon: Jesi Epps DO;  [facility]: [facility];  Service: Obstetrics    WISDOM TOOTH EXTRACTION Bilateral      OB History    Para Term  AB Living   4 3 3 0 1 3   SAB TAB Ectopic Multiple Live Births   1 0 0 0 3      # Outcome Date GA Lbr Tray/2nd Weight Sex Delivery Anes PTL Lv   4 Term 21 39w4d  4224 g (9 lb 5 oz) F CS-LTranv Spinal N RIYA   3 Term 18 40w3d 10:20 / 04:02 3714 g (8 lb 3 oz) M CS-LTranv EPI N RIYA      Complications: Cephalopelvic Disproportion, Failure to Progress in Second Stage   2 SAB 17     SAB      1 Term 12/10/14 39w3d  3544 g (7 lb 13 oz) M CS-LTranv EPI N RIYA      Complications: Cephalopelvic Disproportion     Family History   Problem Relation Age of Onset    Hyperlipidemia Father     Heart disease Father         defect    Heart disease Maternal Grandmother         defect    Cancer Maternal Grandfather         lung    Heart disease Maternal Grandfather         defect    COPD Maternal Grandfather         emphysema    Cancer Paternal Grandmother         lung    Hyperlipidemia Paternal Grandfather     Heart disease Paternal Grandfather         defect    No Known Problems Mother     No Known Problems Brother     No Known Problems Son     No Known Problems Son     Migraines Maternal Aunt     Migraines Paternal Aunt      Social History   Social History     Substance and Sexual Activity   Alcohol Use Not Currently    Alcohol/week: 2 0 standard drinks    Types: 2 Cans of beer per week     Social History     Substance and Sexual Activity   Drug Use No     Social History     Tobacco Use   Smoking Status Never Smoker   Smokeless Tobacco Never Used       Meds/Allergies   No current facility-administered medications for this encounter          No Known Allergies    Objective   Vitals:  Vitals: 03/04/21 1730 03/04/21 1800 03/04/21 1830 03/04/21 1925   BP: 125/80 129/80 140/71 113/69   BP Location: Right arm Right arm Right arm Right arm   Pulse: (!) 44 (!) 42 (!) 44 (!) 45   Resp:    20   Temp:       TempSrc:       SpO2: 97% 98% 99% 97%       No intake or output data in the 24 hours ending 03/04/21 1931    Physical Exam  Vitals signs and nursing note reviewed  Constitutional:       Appearance: She is well-developed  Neck:      Thyroid: No thyroid mass or thyroid tenderness  Vascular: No JVD  Cardiovascular:      Rate and Rhythm: Normal rate and regular rhythm  Heart sounds: No murmur  No gallop  Pulmonary:      Effort: Pulmonary effort is normal       Breath sounds: Normal breath sounds  No wheezing or rales  Chest:      Chest wall: No tenderness  Abdominal:      General: There is no distension  Palpations: Abdomen is soft  Tenderness: There is no abdominal tenderness  There is no guarding or rebound  Comments: Incision is c/d/i   Musculoskeletal: Normal range of motion  General: No tenderness  Comments: +2 nonpitting edema    Neurological:      General: No focal deficit present  Mental Status: She is alert and oriented to person, place, and time     Psychiatric:         Mood and Affect: Mood normal          Behavior: Behavior normal          LAB RESULTS  Admission on 03/04/2021   Component Date Value    WBC 03/04/2021 10 13     RBC 03/04/2021 3 18*    Hemoglobin 03/04/2021 9 2*    Hematocrit 03/04/2021 29 9*    MCV 03/04/2021 94     MCH 03/04/2021 28 9     MCHC 03/04/2021 30 8*    RDW 03/04/2021 15 4*    MPV 03/04/2021 10 0     Platelets 10/26/1300 371     nRBC 03/04/2021 0     Neutrophils Relative 03/04/2021 56     Immat GRANS % 03/04/2021 2     Lymphocytes Relative 03/04/2021 31     Monocytes Relative 03/04/2021 8     Eosinophils Relative 03/04/2021 2     Basophils Relative 03/04/2021 1     Neutrophils Absolute 03/04/2021 5 65     Immature Grans Absolute 2021 0 21*    Lymphocytes Absolute 2021 3 17     Monocytes Absolute 2021 0 83     Eosinophils Absolute 2021 0 18     Basophils Absolute 2021 0 09     Sodium 2021 139     Potassium 2021 4 8     Chloride 2021 109*    CO2 2021 21     ANION GAP 2021 9     BUN 2021 14     Creatinine 2021 0 67     Glucose 2021 89     Calcium 2021 7 9*    Corrected Calcium 2021 9 1     AST 2021 43     ALT 2021 31     Alkaline Phosphatase 2021 99     Total Protein 2021 6 8     Albumin 2021 2 5*    Total Bilirubin 2021 0 34     eGFR 2021 113     Troponin I 2021 <0 02     D-Dimer, Quant 2021 3 58*    NT-proBNP 2021 449*    Color, UA 2021 Red     Clarity, UA 2021 Cloudy     Specific Gravity, UA 2021 >=1 030     pH, UA 2021 6 5     Leukocytes, UA 2021 Trace*    Nitrite, UA 2021 Negative     Protein, UA 2021 >=300*    Glucose, UA 2021 Negative     Ketones, UA 2021 Trace*    Urobilinogen, UA 2021 1 0     Bilirubin, UA 2021 Negative     Blood, UA 2021 Large*    RBC, UA 2021 Innumerable*    WBC, UA 2021 20-30*    Epithelial Cells 2021 Occasional     Bacteria, UA 2021 Occasional        IMAGING  CTA - CHEST WITH IV CONTRAST - PULMONARY ANGIOGRAM     INDICATION:   PE suspected, intermediate prob, positive D-dimer  Chest Tightness, SOB, Recent , Elevated D-Dimer      COMPARISON: Chest radiograph from earlier the same day     TECHNIQUE: CTA examination of the chest was performed using angiographic technique according to a protocol specifically tailored to evaluate for pulmonary embolism  Axial, sagittal, and coronal 2D reformatted images were created from the source data and   submitted for interpretation    In addition, coronal 3D MIP postprocessing was performed on the acquisition scanner        Radiation dose length product (DLP) for this visit:  183 mGy-cm   This examination, like all CT scans performed in the [facility], was performed utilizing techniques to minimize radiation dose exposure, including the use of iterative   reconstruction and automated exposure control      IV Contrast:  85 mL of iohexol (OMNIPAQUE)     FINDINGS:     PULMONARY ARTERIAL TREE:  No pulmonary embolus is seen       LUNGS:  Lungs are clear  There is no tracheal or endobronchial lesion      PLEURA:  Unremarkable      HEART/GREAT VESSELS:  Unremarkable for patient's age      MEDIASTINUM AND TERRI:  Unremarkable      CHEST WALL AND LOWER NECK:   Unremarkable      VISUALIZED STRUCTURES IN THE UPPER ABDOMEN:  Unremarkable      OSSEOUS STRUCTURES:  No acute fracture or destructive osseous lesion      IMPRESSION:     No pulmonary embolus or other acute abnormality identified      Shivam Laguna PA-C     3/4/2021  4:10 PM  ECG 12 Lead Documentation Only    Date/Time: 3/4/2021 2:18 PM  Performed by: Shivam Laguna PA-C  Authorized by: Trell Diez MD     Indications / Diagnosis:  Chest Tightness, SOB  ECG reviewed by me, the ED Provider: yes    Patient location:  ED  Previous ECG:     Previous ECG:  Unavailable  Rate:     ECG rate:  52    ECG rate assessment: bradycardic    Rhythm:     Rhythm: sinus bradycardia    QRS:     QRS axis:  Normal  ST segments:     ST segments:  Normal  T waves:     T waves: flattening      Flattening:  V2 and III  Comments:      No acute ST or T wave changes  QT/QTc 448/416  Assessment/Plan     Assessment:  Patient is a 38 yo [id number] who is here with shortness of breath and chest tightnesss    Plan:  1   Shortness of breath/chest tightness   -troponin, CTA, EKG, and CXR wnl   -ProBNP: 449   -discussed case with cardiology who recommends ECHO tomorrow but not stat   -Per MFM, will check TSH and order IV Lasix to see if symptoms improve  2  S/p RLTCS   -motrin, tylenol, roxicodone prn   -breast pump  3  FEN   -cardiac diet    Discussed with patient that if her symptoms improve, will consider doing ECHO in the outpatient setting  However, if symptoms does not improve, would recommend observation and check ECHO tomorrow AM  Patient was concerned about being separate from her baby  Will discussed with postpartum nursing staff if patient ends up being admitted      Discussed with Dr Adria Arnett  3/4/2021  7:39 PM

## 2021-03-05 NOTE — H&P
H&P - Gynecology   [place] 39 y o  female MRN: [id number]  Unit/Bed#: ED 23 Encounter: [id number]     H&P is initial consult, patient seen by Dr Ab Chapman        HPI: John Schneider is a 39 y o  [id number] s/p RLTCS on  who presents with shortness of breath and chest tightness and headache  Patient states that after being discharged, she always had left back pain but was reassured that this was normal  However, when patient went to lay down, she felt chest tightness and shortness of breath as if her "body was upside down " Patient also reports the tightness goes up to her neck as well  Patient otherwise denies any abdominal pain or leg tenderness  Patient has bilateral leg swelling but was not too concerned about this as she was regarding her chest tightness and shortness of breath      Patient is otherwise breastfeeding and has minimal lochia  She reports improved symptoms since administration of lasix      Review of Systems   Constitutional: Negative for chills and fever  Eyes: Negative for visual disturbance  Respiratory: Positive for shortness of breath  Cardiovascular: Positive for chest pain and leg swelling  Gastrointestinal: Negative for abdominal pain, constipation, diarrhea, nausea and vomiting  Genitourinary: Negative for pelvic pain, urgency, vaginal bleeding, vaginal discharge and vaginal pain  Neurological: Positive for headaches          Medical History        Past Medical History:   Diagnosis Date    Abnormal Pap smear of cervix      , spontaneous complete 2017     Transitioned From: Spontaneous , incomplete    HPV (human papilloma virus) infection      UTI (urinary tract infection) 2016    Varicella       vaccine        Surgical History         Past Surgical History:   Procedure Laterality Date     SECTION          CPD?     COLPOSCOPY         2016    [place]  DELIVERY ONLY N/A 2018     Procedure:  SECTION () REPEAT;  Surgeon: Joanne Gill MD;  Location: [facility] ;  Service: Obstetrics    [place]  DELIVERY ONLY N/A 2021     Procedure:  SECTION () REPEAT;  Surgeon: Philippe Castle DO;  Location: [facility] ;  Service: Obstetrics    WISDOM TOOTH EXTRACTION Bilateral                           OB History    Para Term  AB Living   4 3 3 0 1 3   SAB TAB Ectopic Multiple Live Births      1 0 0 0 3          # Outcome Date GA Lbr Tray/2nd Weight Sex Delivery Anes PTL Lv   4 Term 21 39w4d   4224 g (9 lb 5 oz) F CS-LTranv Spinal N RIYA   3 Term 18 40w3d 10:20 / 04:02 3714 g (8 lb 3 oz) M CS-LTranv EPI N RIYA      Complications: Cephalopelvic Disproportion, Failure to Progress in Second Stage   2 SAB 17         SAB         1 Term 12/10/14 39w3d   3544 g (7 lb 13 oz) M CS-LTranv EPI N RIYA      Complications: Cephalopelvic Disproportion            Family History   Problem Relation Age of Onset    Hyperlipidemia Father      Heart disease Father           defect    Heart disease Maternal Grandmother           defect    Cancer Maternal Grandfather           lung    Heart disease Maternal Grandfather           defect    COPD Maternal Grandfather           emphysema    Cancer Paternal Grandmother           lung    Hyperlipidemia Paternal Grandfather      Heart disease Paternal Grandfather           defect    No Known Problems Mother      No Known Problems Brother      No Known Problems Son      No Known Problems Son      Migraines Maternal Aunt      Migraines Paternal Aunt        Social History         Social History           Substance and Sexual Activity   Alcohol Use Not Currently    Alcohol/week: 2 0 standard drinks    Types: 2 Cans of beer per week      Social History          Substance and Sexual Activity   Drug Use No      Social History          Tobacco Use   Smoking Status Never Smoker   Smokeless Tobacco Never Used         Meds/Allergies Current Medications   No current facility-administered medications for this encounter             No Known Allergies     Objective   Vitals:  Vitals          Vitals:     03/04/21 1730 03/04/21 1800 03/04/21 1830 03/04/21 1925   BP: 125/80 129/80 140/71 113/69   BP Location: Right arm Right arm Right arm Right arm   Pulse: (!) 44 (!) 42 (!) 44 (!) 45   Resp:       20   Temp:           TempSrc:           SpO2: 97% 98% 99% 97%           No intake or output data in the 24 hours ending 03/04/21 1931     Physical Exam  Vitals signs and nursing note reviewed  Constitutional:       Appearance: She is well-developed  Neck:      Thyroid: No thyroid mass or thyroid tenderness  Vascular: No JVD  Cardiovascular:      Rate and Rhythm: Normal rate and regular rhythm  Heart sounds: No murmur  No gallop  Pulmonary:      Effort: Pulmonary effort is normal       Breath sounds: Normal breath sounds  No wheezing or rales  Chest:      Chest wall: No tenderness  Abdominal:      General: There is no distension  Palpations: Abdomen is soft  Tenderness: There is no abdominal tenderness  There is no guarding or rebound  Comments: Incision is c/d/i   Musculoskeletal: Normal range of motion  General: No tenderness  Comments: +2 nonpitting edema    Neurological:      General: No focal deficit present  Mental Status: She is alert and oriented to person, place, and time     Psychiatric:         Mood and Affect: Mood normal          Behavior: Behavior normal             LAB RESULTS        Admission on 03/04/2021   Component Date Value    WBC 03/04/2021 10 13     RBC 03/04/2021 3 18*    Hemoglobin 03/04/2021 9 2*    Hematocrit 03/04/2021 29 9*    MCV 03/04/2021 94     MCH 03/04/2021 28 9     MCHC 03/04/2021 30 8*    RDW 03/04/2021 15 4*    MPV 03/04/2021 10 0     Platelets 55/08/1960 371     nRBC 03/04/2021 0     Neutrophils Relative 03/04/2021 56     Immat GRANS % 03/04/2021 2  Lymphocytes Relative 2021 31     Monocytes Relative 2021 8     Eosinophils Relative 2021 2     Basophils Relative 2021 1     Neutrophils Absolute 2021 5 65     Immature Grans Absolute 2021 0 21*    Lymphocytes Absolute 2021 3 17     Monocytes Absolute 2021 0 83     Eosinophils Absolute 2021 0 18     Basophils Absolute 2021 0 09     Sodium 2021 139     Potassium 2021 4 8     Chloride 2021 109*    CO2 2021 21     ANION GAP 2021 9     BUN 2021 14     Creatinine 2021 0 67     Glucose 2021 89     Calcium 2021 7 9*    Corrected Calcium 2021 9 1     AST 2021 43     ALT 2021 31     Alkaline Phosphatase 2021 99     Total Protein 2021 6 8     Albumin 2021 2 5*    Total Bilirubin 2021 0 34     eGFR 2021 113     Troponin I 2021 <0 02     D-Dimer, Quant 2021 3 58*    NT-proBNP 2021 449*    Color, UA 2021 Red     Clarity, UA 2021 Cloudy     Specific Gravity, UA 2021 >=1 030     pH, UA 2021 6 5     Leukocytes, UA 2021 Trace*    Nitrite, UA 2021 Negative     Protein, UA 2021 >=300*    Glucose, UA 2021 Negative     Ketones, UA 2021 Trace*    Urobilinogen, UA 2021 1 0     Bilirubin, UA 2021 Negative     Blood, UA 2021 Large*    RBC, UA 2021 Innumerable*    WBC, UA 2021 20-30*    Epithelial Cells 2021 Occasional     Bacteria, UA 2021 Occasional          IMAGING  CTA - CHEST WITH IV CONTRAST - PULMONARY ANGIOGRAM     INDICATION:   PE suspected, intermediate prob, positive D-dimer  Chest Tightness, SOB, Recent , Elevated D-Dimer      COMPARISON: Chest radiograph from earlier the same day     TECHNIQUE: CTA examination of the chest was performed using angiographic technique according to a protocol specifically tailored to evaluate for pulmonary embolism   Axial, sagittal, and coronal 2D reformatted images were created from the source data and   submitted for interpretation   In addition, coronal 3D MIP postprocessing was performed on the acquisition scanner        Radiation dose length product (DLP) for this visit:  183 mGy-cm    This examination, like all CT scans performed in the [facility], was performed utilizing techniques to minimize radiation dose exposure, including the use of iterative   reconstruction and automated exposure control      IV Contrast:  85 mL of iohexol (OMNIPAQUE)     FINDINGS:     PULMONARY ARTERIAL TREE:  No pulmonary embolus is seen       LUNGS:  Lungs are clear   There is no tracheal or endobronchial lesion      PLEURA:  Unremarkable      HEART/GREAT VESSELS:  Unremarkable for patient's age      MEDIASTINUM AND TERRI:  Unremarkable      CHEST WALL AND LOWER NECK:   Unremarkable      VISUALIZED STRUCTURES IN THE UPPER ABDOMEN:  Unremarkable      OSSEOUS STRUCTURES:  No acute fracture or destructive osseous lesion      IMPRESSION:     No pulmonary embolus or other acute abnormality identified      Parisa Mack PA-C     3/4/2021  4:10 PM  ECG 12 Lead Documentation Only    Date/Time: 3/4/2021 2:18 PM  Performed by: Parisa Mack PA-C  Authorized by: Edwardo Perales MD     Indications / Diagnosis:  Chest Tightness, SOB  ECG reviewed by me, the ED Provider: yes    Patient location:  ED  Previous ECG:     Previous ECG:  Unavailable  Rate:     ECG rate:  52    ECG rate assessment: bradycardic    Rhythm:     Rhythm: sinus bradycardia    QRS:     QRS axis:  Normal  ST segments:     ST segments:  Normal  T waves:     T waves: flattening      Flattening:  V2 and III  Comments:      No acute ST or T wave changes  QT/QTc 448/416      Assessment/Plan      Assessment:  Patient is a 38 yo [id number] who is here with shortness of breath and chest tightnesss     Plan:  1   Shortness of breath/chest tightness              -troponin, CTA, EKG, and CXR wnl              -ProBNP: 449              -discussed case with cardiology who recommends ECHO tomorrow but not stat              -Per MFM, will check TSH and order IV Lasix to see if symptoms improve  2  S/p RLTCS              -motrin, tylenol, roxicodone prn              -breast pump  3   FEN              -cardiac diet     ECHO tomorrow AM      Admitted to postpartum floor

## 2021-03-05 NOTE — PLAN OF CARE
Problem: POSTPARTUM  Goal: Experiences normal postpartum course  Description: INTERVENTIONS:  - Monitor maternal vital signs  - Assess uterine involution and lochia  Outcome: Progressing  Goal: Appropriate maternal -  bonding  Description: INTERVENTIONS:  - Identify family support  - Assess for appropriate maternal/infant bonding   -Encourage maternal/infant bonding opportunities  - Referral to  or  as needed  Outcome: Progressing  Goal: Establishment of infant feeding pattern  Description: INTERVENTIONS:  - Assess breast/bottle feeding  - Refer to lactation as needed  Outcome: Progressing  Goal: Incision(s), wounds(s) or drain site(s) healing without S/S of infection  Description: INTERVENTIONS  - Assess and document risk factors for skin impairment   - Assess and document dressing, incision, wound bed, drain sites and surrounding tissue  - Consider nutrition services referral as needed  - Oral mucous membranes remain intact  - Provide patient/ family education  Outcome: Progressing     Problem: PAIN - ADULT  Goal: Verbalizes/displays adequate comfort level or baseline comfort level  Description: Interventions:  - Encourage patient to monitor pain and request assistance  - Assess pain using appropriate pain scale  - Administer analgesics based on type and severity of pain and evaluate response  - Implement non-pharmacological measures as appropriate and evaluate response  - Consider cultural and social influences on pain and pain management  - Notify physician/advanced practitioner if interventions unsuccessful or patient reports new pain  Outcome: Progressing     Problem: INFECTION - ADULT  Goal: Absence or prevention of progression during hospitalization  Description: INTERVENTIONS:  - Assess and monitor for signs and symptoms of infection  - Monitor lab/diagnostic results  - Monitor all insertion sites, i e  indwelling lines, tubes, and drains  - Monitor endotracheal if appropriate and nasal secretions for changes in amount and color  - [place] appropriate cooling/warming therapies per order  - Administer medications as ordered  - Instruct and encourage patient and family to use good hand hygiene technique  - Identify and instruct in appropriate isolation precautions for identified infection/condition  Outcome: Progressing  Goal: Absence of fever/infection during neutropenic period  Description: INTERVENTIONS:  - Monitor WBC    Outcome: Progressing     Problem: SAFETY ADULT  Goal: Patient will remain free of falls  Description: INTERVENTIONS:  - Assess patient frequently for physical needs  -  Identify cognitive and physical deficits and behaviors that affect risk of falls    -  [place] fall precautions as indicated by assessment   - Educate patient/family on patient safety including physical limitations  - Instruct patient to call for assistance with activity based on assessment  - Modify environment to reduce risk of injury  - Consider OT/PT consult to assist with strengthening/mobility  Outcome: Progressing  Goal: Maintain or return to baseline ADL function  Description: INTERVENTIONS:  -  Assess patient's ability to carry out ADLs; assess patient's baseline for ADL function and identify physical deficits which impact ability to perform ADLs (bathing, care of mouth/teeth, toileting, grooming, dressing, etc )  - Assess/evaluate cause of self-care deficits   - Assess range of motion  - Assess patient's mobility; develop plan if impaired  - Assess patient's need for assistive devices and provide as appropriate  - Encourage maximum independence but intervene and supervise when necessary  - Involve family in performance of ADLs  - Assess for home care needs following discharge   - Consider OT consult to assist with ADL evaluation and planning for discharge  - Provide patient education as appropriate  Outcome: Progressing  Goal: Maintain or return mobility status to optimal level  Description: INTERVENTIONS:  - Assess patient's baseline mobility status (ambulation, transfers, stairs, etc )    - Identify cognitive and physical deficits and behaviors that affect mobility  - Identify mobility aids required to assist with transfers and/or ambulation (gait belt, sit-to-stand, lift, walker, cane, etc )  - [place] fall precautions as indicated by assessment  - Record patient progress and toleration of activity level on Mobility SBAR; progress patient to next Phase/Stage  - Instruct patient to call for assistance with activity based on assessment  - Consider rehabilitation consult to assist with strengthening/weightbearing, etc   Outcome: Progressing     Problem: Knowledge Deficit  Goal: Patient/family/caregiver demonstrates understanding of disease process, treatment plan, medications, and discharge instructions  Description: Complete learning assessment and assess knowledge base    Interventions:  - Provide teaching at level of understanding  - Provide teaching via preferred learning methods  Outcome: Progressing     Problem: DISCHARGE PLANNING  Goal: Discharge to home or other facility with appropriate resources  Description: INTERVENTIONS:  - Identify barriers to discharge w/patient and caregiver  - Arrange for needed discharge resources and transportation as appropriate  - Identify discharge learning needs (meds, wound care, etc )  - Arrange for interpretive services to assist at discharge as needed  - Refer to Case Management Department for coordinating discharge planning if the patient needs post-hospital services based on physician/advanced practitioner order or complex needs related to functional status, cognitive ability, or social support system  Outcome: Progressing

## 2021-03-05 NOTE — DISCHARGE INSTRUCTIONS
WHAT YOU NEED TO KNOW:   A , or  section, is abdominal surgery to deliver your baby  DISCHARGE INSTRUCTIONS:   Call 911 for any of the following:   · You feel lightheaded, short of breath, and have chest pain  · You cough up blood  Seek care immediately if:   · Blood soaks through your bandage  · Your stitches come apart  · Your arm or leg feels warm, tender, and painful  It may look swollen and red  Contact your OB if:   · You have heavy vaginal bleeding that fills 1 or more sanitary pads in 1 hour  · You have a fever  · Your incision is swollen, red, or draining pus  · You have questions or concerns about yourself or your baby  Medicines: You may  need any of the following:  · Prescription pain medicine  may be given  Ask how to take this medicine safely  · Acetaminophen  decreases pain and fever  It is available without a doctor's order  Ask how much to take and how often to take it  Follow directions  Acetaminophen can cause liver damage if not taken correctly  · NSAIDs , such as ibuprofen, help decrease swelling, pain, and fever  NSAIDs can cause stomach bleeding or kidney problems in certain people  If you take blood thinner medicine, always ask your healthcare provider if NSAIDs are safe for you  Always read the medicine label and follow directions  · Take your medicine as directed  Contact your healthcare provider if you think your medicine is not helping or if you have side effects  Tell him or her if you are allergic to any medicine  Keep a list of the medicines, vitamins, and herbs you take  Include the amounts, and when and why you take them  Bring the list or the pill bottles to follow-up visits  Carry your medicine list with you in case of an emergency  Wound care:  Carefully wash your wound with soap and water every day  Keep your wound clean and dry  Wear loose, comfortable clothes that do not rub against your wound   Ask your OB about bathing and showering  Limit activity as directed:   · Ask when it is safe for you to drive, walk up stairs, lift heavy objects, and have sex  · Ask when it is okay to exercise, and what types of exercise to do  Start slowly and do more as you get stronger  Drink liquids as directed:  Liquids help keep you hydrated after your procedure and decrease your risk for a blood clot  Ask how much liquid to drink each day and which liquids are best for you  Follow up with your OB as directed: You may need to return to have your stitches or staples removed  Write down your questions so you remember to ask them during your visits  © 2017 [address] Information is for End User's use only and may not be sold, redistributed or otherwise used for commercial purposes  All illustrations and images included in CareNotes® are the copyrighted property of [employer] M [employer]  or Dimitris Reyes  The above information is an  only  It is not intended as medical advice for individual conditions or treatments  Talk to your doctor, nurse or pharmacist before following any medical regimen to see if it is safe and effective for you  Postpartum Perineal Care   WHAT YOU NEED TO KNOW:   Postpartum perineal care is care for your perineum after you have a baby  The perineum is your vagina and anus  DISCHARGE INSTRUCTIONS:   Care for your perineum:  Healthcare providers will give you a small squirt bottle and show you how to use it   Do the following after you use the toilet and before you put on a new pad:  · Remove the soiled pad    · Use the squirt bottle to rinse your perineum from front to back while you sit on the toilet     · Pat the area dry from front to back with toilet paper or a cotton cloth     · Put on a fresh pad    · Wash your hands  Decrease pain:  Ask your healthcare provider about these and other ways to decrease perineal pain:  · Sitz baths:  Healthcare providers may give you a portable sitz bath  This is a small tub that fits in the toilet  Fill the sitz bath or bathtub with 4 to 6 inches of warm water  Sit in the warm water for 20 minutes 2 to 3 times a day  · Ice:  Ice helps decrease swelling and pain  Ice may also help prevent tissue damage  Use an ice pack, or put crushed ice in a plastic bag  Cover it with a towel and place it on your perineum for 15 to 20 minutes every hour, or as directed  · Medicine spray, wipes, or pads:  Healthcare providers may give you a medicine spray or wipes soaked with numbing medicine to decrease the pain  Pads that contain an herb called witch hazel may also help reduce pain  Use these after perineal care or a sitz bath  Follow up with your healthcare provider as directed:  Write down your questions so you remember to ask them during your visits  Contact your healthcare provider if:   · You have heavy vaginal bleeding that fills 1 or more sanitary pads in 1 hour  · You have foul-smelling vaginal discharge  · You feel weak or lightheaded  · You have questions or concerns about your condition or care  Seek care immediately or call 911 if:   · You have large blood clots or bright red blood coming from your vagina  · You have abdominal pain, vomiting, and a fever  © 2017 [address] [facility] Information is for End User's use only and may not be sold, redistributed or otherwise used for commercial purposes  All illustrations and images included in CareNotes® are the copyrighted property of A [employer] A M [employer]  or Dimitris Reyes  The above information is an  only  It is not intended as medical advice for individual conditions or treatments  Talk to your doctor, nurse or pharmacist before following any medical regimen to see if it is safe and effective for you  Postpartum Depression   WHAT YOU NEED TO KNOW:   What is postpartum depression?   Postpartum depression is a mood disorder that occurs after giving birth  A mood is an emotion or a feeling  Moods affect your behavior and how you feel about yourself and life in general  Depression is a sad mood that you cannot control  Women often feel sad, afraid, or nervous after their baby is born  These feelings are called postpartum blues or baby blues, and they usually go away in 1 to 2 weeks  With postpartum depression, these symptoms get worse and continue for more than 2 weeks  Postpartum depression is a serious condition that affects your daily activities and relationships  What causes postpartum depression? Healthcare providers do not know exactly what causes postpartum depression  It may be caused by a sudden drop in hormone levels after childbirth  A previous episode of postpartum depression or a family history of depression may increase your risk  Several things may trigger postpartum depression:  · Lack of support from the baby's father or other family members    · Feeling more tired than usual    · Stress, a poor diet, or lack of sleep    · Pain after childbirth or pain during breastfeeding    · Sudden change in lifestyle  How is postpartum depression diagnosed? Postpartum depression affects your daily activities and your relationships with other people  Healthcare providers will ask you questions about your signs and symptoms and how they are affecting your life  The symptoms of postpartum depression usually begin within 1 month after childbirth  You feel depressed or lose interest in activities you enjoy nearly every day for at least 2 weeks  You also have 4 or more of the following symptoms:  · You feel tired or have less energy than usual      · You feel unimportant or guilty most of the time  · You think about hurting or killing yourself  · Your appetite changes  You may lose your appetite and lose weight without trying  Your appetite may also increase and you may gain weight  · You are restless, irritable, or withdrawn      · You have trouble concentrating and remembering things  You have trouble doing daily tasks or making decisions  · You have trouble sleeping, even after the baby is asleep  How is postpartum depression treated? · Psychotherapy:  During therapy, you will talk with healthcare providers about how to cope with your feelings and moods  This can be done alone or in a group  It may also be done with family members or your partner  · Antidepressants: This medicine is given to decrease or stop the symptoms of depression  You usually need to take antidepressants for several weeks before you begin to feel better  Do not stop taking antidepressants unless your healthcare provider tells you to  Healthcare providers may try a different antidepressant if one type does not work  What can I do to feel better? · Rest:  Do not try to do everything all at the same time  Do only what is needed and let other things wait until later  Ask your family or friends for help, especially if you have other children  Ask your partner to help with night feedings or other baby care  Try to sleep when the baby naps  · Get emotional support:  Share your feelings with your partner, a friend, or another mother  · Take care of yourself:  Shower and dress each day  Do not skip meals  Try to get out of the house a little each day  Get regular exercise  Eat a healthy diet  Avoid alcohol because it can make your depression worse  Do not isolate yourself  Go for a walk or meet with a friend  It is also important that you have some time by yourself each day  How do I find support and more information? · [address] [place] [address], Public Information & Communication Branch  [address], [address], [address]  [place] [address]   Phone: [phone]  Phone: [phone]  Web Address: hospitals  When should I contact my healthcare provider?    · You cannot make it to your next visit     · Your depression does not get better with treatment or it gets worse  · You have questions or concerns about your condition or care  When should I seek immediate care or call 911? · You think about hurting or killing yourself, your baby, or someone else  · You feel like other people want to hurt you  · You hear voices telling you to hurt yourself or your baby  CARE AGREEMENT:   You have the right to help plan your care  Learn about your health condition and how it may be treated  Discuss treatment options with your caregivers to decide what care you want to receive  You always have the right to refuse treatment  The above information is an  only  It is not intended as medical advice for individual conditions or treatments  Talk to your doctor, nurse or pharmacist before following any medical regimen to see if it is safe and effective for you  ©  [address] Information is for End User's use only and may not be sold, redistributed or otherwise used for commercial purposes  All illustrations and images included in CareNotes® are the copyrighted property of A [employer] M [employer]  or Dimitris Reyes  Postpartum Bleeding   WHAT YOU NEED TO KNOW:   Postpartum bleeding is vaginal bleeding after childbirth  This bleeding is normal, whether your baby was born vaginally or by   It contains blood and the tissue that lined the inside of your uterus when you were pregnant  DISCHARGE INSTRUCTIONS:   What to expect with postpartum bleeding:  Postpartum bleeding usually lasts at least 10 days, and may last longer than 6 weeks  Your bleeding may range from light (barely staining a pad) to heavy (soaking a pad in 1 hour)  Usually, you have heavier bleeding right after childbirth, which slows over the next few weeks until it stops  The bleeding is red or dark brown with clots for the first 1 to 3 days   It then turns pink for several days, and then becomes a white or yellow discharge until it ends  Follow up with your obstetrician as directed:  Do not have sex until your obstetrician says it is okay  Write down your questions so you remember to ask them during your visits  Contact your healthcare provider or obstetrician if:   · Your bleeding increases, or you have heavy bleeding that soaks a pad in 1 hour for 2 hours in a row  · You pass large blood clots  · You are breathing faster than normal, or your heart is beating faster than normal     · You are urinating less than usual, or not at all  · You feel dizzy  · You have questions or concerns about your condition or care  Seek immediate care or call 911 if:   · You are suddenly short of breath and feel lightheaded  · You have sudden chest pain  © 2017 [address]  Information is for End User's use only and may not be sold, redistributed or otherwise used for commercial purposes  All illustrations and images included in CareNotes® are the copyrighted property of [employer]  or Dimitris Reyes  The above information is an  only  It is not intended as medical advice for individual conditions or treatments  Talk to your doctor, nurse or pharmacist before following any medical regimen to see if it is safe and effective for you  Breast Care for the Breast Feeding Mother   WHAT YOU SHOULD KNOW:   Your breasts will go through normal changes while you are breastfeeding  Sometimes breast and nipple problems can develop while you are breastfeeding  Learn about changes that are normal and those that may be a problem  Breast care can help you prevent and manage problems so you and your baby can enjoy the benefits of breastfeeding  AFTER YOU LEAVE:   Breast changes while you are breastfeeding:   · For the first few days after your baby is born, your body makes a small amount of breast milk (colostrum)   Within about 2 to 5 days, your body will begin making mature milk  It may take up to 10 days or longer for mature milk to come in  When your mature milk comes in, your breasts will become full and firm  They may feel tender  · Breastfeeding your baby will decrease the full feeling in your breasts  You may feel a tingly sensation during feedings as milk is released from your breasts  This is called the milk let-down reflex  After 7 or more days, the fullness may feel like it has decreased  Your nipples should look the same as they did before you started breastfeeding  Breasts that feel full before and empty after breastfeeding are signs that breastfeeding is going well  Breast problems that can occur while you are breastfeeding:   · Nipple soreness  may occur when you begin to breastfeed your baby  You may also have nipple soreness if your baby is not latched on to your breast correctly  Correct positioning and latch-on may decrease or stop the pain in your nipples  Work with your caregivers to help your baby latch on correctly  It may also be helpful to place warm, wet compresses on your nipples to help decrease pain  · Plugged milk ducts  may cause painful breast lumps  Plugged ducts may be caused by not emptying your breasts completely during feedings  When your baby pauses during breastfeeding, massage and gently squeeze your breast  Gentle massage may unplug a blocked milk duct  Pump out any milk left in your breasts after your baby is done breastfeeding  Avoid wearing tight tops, tight bras, or under-wire bras, because they may put pressure on your breasts  · Engorgement  may occur as your milk comes in soon after you begin breastfeeding  Engorgement may cause your breasts to become swollen and painful  Your breasts may also become engorged if you miss a feeding or you do not breastfeed on demand  The best way to decrease engorgement symptoms is to empty your breasts by feeding your baby often   Engorgement can make it hard for your baby to latch on to your breast  If this happens, express a small amount of milk and then have your baby latch on  Cold compresses, gel packs, or ice packs on your breasts can help decrease pain and swelling  Ask your caregiver how often and how long you should use cold, or ice packs  · A breast infection called mastitis  can develop if you have plugged milk ducts or engorgement  Mastitis causes your breasts to become red, swollen, and painful  You may also have flu-like symptoms, such as chills and a fever  Place heat on your breasts to help decrease the pain  You may want to place a moist, warm cloth on the painful breast or both of your breasts  Ask how often to do this  Your primary healthcare provider [facility]) may suggest that you take an NSAID, such as ibuprofen, to decrease pain and swelling  He may also order antibiotics to treat mastitis  Ask about feeding your baby when you have a breast infection  How to help prevent or manage breast problems while you are breastfeeding:   · Learn how to position your baby and latch him on correctly  To latch your baby correctly to your breast, make sure that his mouth covers most of your areola (dark area around your nipple)  He should not be attached only to the nipple  Your baby is latched on well if you feel comfortable and do not feel pain  A correct latch helps him get enough milk and can help to prevent sore nipples and other breast problems  There are several breastfeeding positions that you can try  Find the position that works best for you and your baby  Ask your caregiver for more information about how to hold and breastfeed your baby  · Prevent biting  Your baby may get teeth at about 1to 3months of age  To help prevent biting, break his suction once he is finished or if he has fallen asleep  To break his suction, slip a finger into the side of his mouth  If your baby bites you, respond with surprise or unhappiness  Offer praise when he does not bite you       · Breastfeed your baby regularly  Feed your baby 8 to 12 times a day  You may need to wake up your baby at night to feed him  It is okay to feed from 1 or both breasts at each feeding  Your baby should breastfeed from both breasts equally over the course of a day  If your baby only feeds from 1 side during a feeding, offer your other breast to him first for the next feeding  · Schedule and keep follow-up visits  Talk to your baby's pediatrician or your PHP during follow-up visits if you have breast problems  Caregivers may suggest that you, or you and your partner, attend classes on breastfeeding  You also may want to join a breastfeeding support group  Caregivers may suggest that you see a lactation consultant  This is a caregiver who can help you with breastfeeding  Contact your PHP if:   · You have a fever and chills  · You have body aches and you feel like you do not have any energy  · One or both of your breasts is red, swollen or hard, painful, and feels warm or hot  · You have breast engorgement that does not get better within 24 hours  · You see or feel a lump in your breast that hurts when you touch it  · You have nipple pain during breastfeeding or between feedings  · Your nipples are red, dry, cracked, or bleeding, or they have scabs on them  · You have questions or concerns about your condition or care  © 2014 [address] is for End User's use only and may not be sold, redistributed or otherwise used for commercial purposes  All illustrations and images included in CareNotes® are the copyrighted property of A [employer] M [employer]  or Dimitris Reyes  The above information is an  only  It is not intended as medical advice for individual conditions or treatments  Talk to your doctor, nurse or pharmacist before following any medical regimen to see if it is safe and effective for you

## 2021-03-09 DIAGNOSIS — Z98.891 S/P CESAREAN SECTION: ICD-10-CM

## 2021-03-09 RX ORDER — IBUPROFEN 600 MG/1
600 TABLET ORAL EVERY 6 HOURS SCHEDULED
Qty: 30 TABLET | Refills: 0 | Status: SHIPPED | OUTPATIENT
Start: 2021-03-09 | End: 2021-03-15

## 2021-03-13 DIAGNOSIS — R06.00 DYSPNEA ON EXERTION: ICD-10-CM

## 2021-03-13 DIAGNOSIS — Z98.891 S/P CESAREAN SECTION: ICD-10-CM

## 2021-03-15 ENCOUNTER — POSTPARTUM VISIT (OUTPATIENT)
Dept: OBGYN CLINIC | Facility: CLINIC | Age: 36
End: 2021-03-15

## 2021-03-15 VITALS
BODY MASS INDEX: 25.71 KG/M2 | DIASTOLIC BLOOD PRESSURE: 62 MMHG | HEIGHT: 64 IN | SYSTOLIC BLOOD PRESSURE: 114 MMHG | WEIGHT: 150.6 LBS

## 2021-03-15 DIAGNOSIS — Z98.891 S/P CESAREAN SECTION: ICD-10-CM

## 2021-03-15 PROCEDURE — 99024 POSTOP FOLLOW-UP VISIT: CPT | Performed by: OBSTETRICS & GYNECOLOGY

## 2021-03-15 RX ORDER — IBUPROFEN 600 MG/1
600 TABLET ORAL EVERY 6 HOURS SCHEDULED
Qty: 30 TABLET | Refills: 0 | Status: SHIPPED | OUTPATIENT
Start: 2021-03-15 | End: 2021-03-22

## 2021-03-15 NOTE — PROGRESS NOTES
Assessment/Plan     Normal postpartum exam      1  Contraception: condoms and vasectomy  2  Annual exam due in May  Pap due   3  Lactation consult, [address] [facility] information discussed  4  Increase activity as tolerated, may resume all normal activity  5  Anticipated return to work: early July      Tye Ortega is a 39 y o  female who presents for a postpartum visit  She is 3 weeks postpartum following a repeat  section, low transverse incision  I have fully reviewed the prenatal and intrapartum course  The delivery was at 44 gestational weeks  Anesthesia: spinal  Laceration: n/a  Postpartum course complicated by readmission for CP, SOB, OLIVIA - normal echo, symptoms improved with lasix  Has not required any lasix since hospital discharge  No further symptoms, feeling well  Bleeding staining only  Bowel function is normal  Bladder function is normal  Patient has not been sexually active  Desired contraception method is condoms and vasectomy  Postpartum depression screening: negative  EPDS : 4    Baby's course has been uncomplicated     Baby is feeding by breast     Last Pap : 2018 ; no abnormalities  Gestational Diabetes: no  Pregnancy Complications: none    The following portions of the patient's history were reviewed and updated as appropriate: allergies, current medications, past family history, past medical history, past social history, past surgical history and problem list       Current Outpatient Medications:     acetaminophen (TYLENOL) 325 mg tablet, Take 2 tablets (650 mg total) by mouth every 6 (six) hours as needed for headaches, Disp: 30 tablet, Rfl: 0    calcium carbonate (TUMS) 500 mg chewable tablet, Chew 2 tablets daily after dinner, Disp: , Rfl:     Ferrous Sulfate (IRON PO), Take 100 mg of iron by mouth daily, Disp: , Rfl:     furosemide (LASIX) 40 mg tablet, Take 1 tablet (40 mg total) by mouth as needed (if weight gain of over 3lbs in 24 hours), Disp: 10 tablet, Rfl: 0    hydrocortisone (ANUSOL-HC) 2 5 % rectal cream, Apply topically 2 (two) times a day (Patient not taking: Reported on 3/4/2021), Disp: 30 g, Rfl: 1    ibuprofen (MOTRIN) 600 mg tablet, TAKE 1 TABLET (600 MG TOTAL) BY MOUTH EVERY 6 (SIX) HOURS, Disp: 30 tablet, Rfl: 0    lidocaine (LMX) 4 % cream, Apply topically as needed for mild pain (Patient not taking: Reported on 3/4/2021), Disp: 30 g, Rfl: 0    MAGNESIUM PO, Take by mouth, Disp: , Rfl:     Prenat w/o [id number] (PNV-DHA PO), Take by mouth, Disp: , Rfl:     No Known Allergies    Review of Systems  Constitutional: no fever, feels well  Breasts: no complaints of breast pain, breast lump, or nipple discharge  Gastrointestinal: no complaints nausea, vomiting  Genitourinary: as noted in HPI  Neurological: no complaints of headache      Objective      There were no vitals taken for this visit      OBGyn Exam  General: alert and oriented, in no acute distress  Abdomen: soft, nondistended and nontender  Incision: clean, dry, and intact  LE: No edema bilateral lower extremeties

## 2021-03-16 RX ORDER — FUROSEMIDE 40 MG/1
40 TABLET ORAL AS NEEDED
Qty: 10 TABLET | Refills: 0 | OUTPATIENT
Start: 2021-03-16

## 2021-03-18 DIAGNOSIS — Z98.891 S/P CESAREAN SECTION: ICD-10-CM

## 2021-03-18 DIAGNOSIS — R06.00 DYSPNEA ON EXERTION: ICD-10-CM

## 2021-03-18 RX ORDER — FUROSEMIDE 40 MG/1
40 TABLET ORAL AS NEEDED
Qty: 10 TABLET | Refills: 0 | OUTPATIENT
Start: 2021-03-18

## 2021-03-22 RX ORDER — IBUPROFEN 600 MG/1
600 TABLET ORAL EVERY 6 HOURS SCHEDULED
Qty: 30 TABLET | Refills: 0 | OUTPATIENT
Start: 2021-03-22

## 2021-04-12 ENCOUNTER — TELEPHONE (OUTPATIENT)
Dept: OBGYN CLINIC | Facility: CLINIC | Age: 36
End: 2021-04-12

## 2021-04-12 DIAGNOSIS — B37.0 ORAL THRUSH: Primary | ICD-10-CM

## 2021-04-12 NOTE — TELEPHONE ENCOUNTER
Pt Left VM on nurse line stating her daughter currently has thrush and the pediatrician believes the mother has thrush too but states pt must call OB office for a prescription  [facility]ses [facility]   [id number]

## 2021-10-25 ENCOUNTER — OFFICE VISIT (OUTPATIENT)
Dept: NEUROLOGY | Facility: CLINIC | Age: 36
End: 2021-10-25
Payer: COMMERCIAL

## 2021-10-25 VITALS
DIASTOLIC BLOOD PRESSURE: 80 MMHG | BODY MASS INDEX: 27.11 KG/M2 | TEMPERATURE: 95.3 F | WEIGHT: 153 LBS | HEART RATE: 80 BPM | HEIGHT: 63 IN | SYSTOLIC BLOOD PRESSURE: 102 MMHG

## 2021-10-25 DIAGNOSIS — G43.009 MIGRAINE WITHOUT AURA AND WITHOUT STATUS MIGRAINOSUS, NOT INTRACTABLE: Primary | ICD-10-CM

## 2021-10-25 PROCEDURE — 99213 OFFICE O/P EST LOW 20 MIN: CPT | Performed by: PSYCHIATRY & NEUROLOGY

## 2021-10-25 RX ORDER — RIZATRIPTAN BENZOATE 10 MG/1
TABLET ORAL
Qty: 9 TABLET | Refills: 5 | Status: SHIPPED | OUTPATIENT
Start: 2021-10-25

## 2022-04-20 ENCOUNTER — OFFICE VISIT (OUTPATIENT)
Dept: NEUROLOGY | Facility: CLINIC | Age: 37
End: 2022-04-20
Payer: COMMERCIAL

## 2022-04-20 VITALS
SYSTOLIC BLOOD PRESSURE: 104 MMHG | BODY MASS INDEX: 26.22 KG/M2 | HEIGHT: 63 IN | WEIGHT: 148 LBS | DIASTOLIC BLOOD PRESSURE: 78 MMHG

## 2022-04-20 DIAGNOSIS — M54.2 CERVICALGIA: ICD-10-CM

## 2022-04-20 DIAGNOSIS — M54.50 LOW BACK PAIN WITHOUT SCIATICA, UNSPECIFIED BACK PAIN LATERALITY, UNSPECIFIED CHRONICITY: ICD-10-CM

## 2022-04-20 DIAGNOSIS — G43.009 MIGRAINE WITHOUT AURA AND WITHOUT STATUS MIGRAINOSUS, NOT INTRACTABLE: Primary | ICD-10-CM

## 2022-04-20 PROCEDURE — 99214 OFFICE O/P EST MOD 30 MIN: CPT | Performed by: PSYCHIATRY & NEUROLOGY

## 2022-04-22 ENCOUNTER — ANNUAL EXAM (OUTPATIENT)
Dept: OBGYN CLINIC | Facility: CLINIC | Age: 37
End: 2022-04-22
Payer: COMMERCIAL

## 2022-04-22 VITALS
BODY MASS INDEX: 25.91 KG/M2 | WEIGHT: 146.2 LBS | DIASTOLIC BLOOD PRESSURE: 58 MMHG | SYSTOLIC BLOOD PRESSURE: 100 MMHG | HEIGHT: 63 IN

## 2022-04-22 DIAGNOSIS — Z01.419 WELL WOMAN EXAM WITH ROUTINE GYNECOLOGICAL EXAM: Primary | ICD-10-CM

## 2022-04-22 PROCEDURE — G0145 SCR C/V CYTO,THINLAYER,RESCR: HCPCS | Performed by: OBSTETRICS & GYNECOLOGY

## 2022-04-22 PROCEDURE — 99395 PREV VISIT EST AGE 18-39: CPT | Performed by: OBSTETRICS & GYNECOLOGY

## 2022-04-22 PROCEDURE — G0476 HPV COMBO ASSAY CA SCREEN: HCPCS | Performed by: OBSTETRICS & GYNECOLOGY

## 2022-04-22 NOTE — PROGRESS NOTES
ASSESSMENT & PLAN: Jorge L Bonilla is a 44 y o  [id number] with normal gynecologic exam     1   Routine well woman exam done today  2    Pap and HPV:Pap with HPV was done today  Current ASCCP Guidelines reviewed  Last Pap  [unfilled] :  no abnormalities  3   The patient declined STD testing  4  The patient is sexually active  She declined contraception and options have been discussed  5  The following were reviewed in today's visit: breast self exam, family planning choices, exercise and healthy diet  6  Patient to return to office in 12 months for WWE  All questions have been answered to her satisfaction  CC:  Annual Gynecologic Examination    HPI: Jorge L Bonilla is a 44 y o  [id number] who presents for annual gynecologic examination  She has the following concerns:  None  Hx of c/s x 3  Using condoms for contraception  Considering vasectomy  Menses are monthly lasting about a week  Health Maintenance:    She wears her seatbelt routinely  She does not perform regular monthly self breast exams  She feels safe at home  Patients does follow a healthy diet  Past Medical History:   Diagnosis Date    Abnormal Pap smear of cervix     , spontaneous complete 2017    Transitioned From: Spontaneous , incomplete    HPV (human papilloma virus) infection     UTI (urinary tract infection) 2016    Varicella     vaccine       Past Surgical History:   Procedure Laterality Date     SECTION       CPD?     COLPOSCOPY          CT  DELIVERY ONLY N/A 2018    Procedure:  SECTION () REPEAT;  Surgeon: Kayce Alvarez MD;  [facility];  Service: Obstetrics    CT  DELIVERY ONLY N/A 2021    Procedure:  SECTION () REPEAT;  Surgeon: Nathalia Stoddard DO;  [facility]: [facility] ;  Service: Obstetrics    WISDOM TOOTH EXTRACTION Bilateral        Past OB/Gyn History:  Period Cycle (Days): 28  Period Duration (Days): 7  Period Pattern: Regular  Menstrual Flow: Heavy  Menstrual Control: Tampon,Maxi pad  Dysmenorrhea: NonePatient's last menstrual period was 2022 (approximate)  Menstrual History:  OB History        4    Para   3    Term   3       0    AB   1    Living   3       SAB   1    IAB   0    Ectopic   0    Multiple   0    Live Births   3                Patient's last menstrual period was 2022 (approximate)  Period Cycle (Days): 28  Period Duration (Days): 7  Period Pattern: Regular  Menstrual Flow: Heavy  Menstrual Control: Tampon,Maxi pad  Dysmenorrhea: None    History of sexually transmitted infection No  Patient is currently sexually active  heterosexual Birth control: condoms  Last Pap  [unfilled] :  no abnormalities      Family History   Problem Relation Age of Onset    Hyperlipidemia Father     Heart disease Father         defect    Heart disease Maternal Grandmother         defect    Cancer Maternal Grandfather         lung    Heart disease Maternal Grandfather         defect    COPD Maternal Grandfather         emphysema    Cancer Paternal Grandmother         lung    Hyperlipidemia Paternal Grandfather     Heart disease Paternal Grandfather         defect    No Known Problems Mother     No Known Problems Brother     No Known Problems Son     No Known Problems Son     Migraines Maternal Aunt     Migraines Paternal Aunt        Social History:  Social History     Socioeconomic History    Marital status: /Civil Union     Spouse name: Not on file    Number of children: Not on file    Years of education: Not on file    Highest education level: Not on file   Occupational History    Not on file   Tobacco Use    Smoking status: Never Smoker    Smokeless tobacco: Never Used   Vaping Use    Vaping Use: Never used   Substance and Sexual Activity    Alcohol use: Not Currently     Alcohol/week: 2 0 standard drinks     Types: 2 Cans of beer per week    Drug use: No    Sexual activity: Yes     Partners: Male     Birth control/protection: None, Condom Male   Other Topics Concern    Not on file   Social History Narrative    Not on file     Social Determinants of Health     Financial Resource Strain: Not on file   Food Insecurity: Not on file   Transportation Needs: Not on file   Physical Activity: Not on file   Stress: Not on file   Social Connections: Not on file   Intimate Partner Violence: Not on file   Housing Stability: Not on file     Presently lives with spouse and 3 children  Patient is   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  No Known Allergies    Current Outpatient Medications:     rizatriptan (MAXALT) 10 MG tablet, One p o  At headache onset, may repeat 1 after 2 hours p r n  Maximum 2/24 hours, Disp: 9 tablet, Rfl: 5    calcium carbonate (TUMS) 500 mg chewable tablet, Chew 2 tablets daily after dinner (Patient not taking: Reported on 10/25/2021), Disp: , Rfl:     Ferrous Sulfate (IRON PO), Take 100 mg of iron by mouth daily (Patient not taking: Reported on 10/25/2021), Disp: , Rfl:     MAGNESIUM PO, Take by mouth (Patient not taking: Reported on 10/25/2021), Disp: , Rfl:     Prenat w/o H-[id number]-[id number]-FA-DHA (PNV-DHA PO), Take by mouth (Patient not taking: Reported on 10/25/2021), Disp: , Rfl:     Review of Systems:  Review of Systems   Constitutional: Negative for activity change, chills, fever and unexpected weight change  HENT: Negative for congestion, ear pain, hearing loss and sore throat  Respiratory: Negative for cough, chest tightness and shortness of breath  Cardiovascular: Negative for chest pain and leg swelling  Gastrointestinal: Negative for abdominal pain, constipation, diarrhea, nausea and vomiting  Endocrine: Negative for polydipsia, polyphagia and polyuria     Genitourinary: Negative for difficulty urinating, dysuria, frequency, menstrual problem, pelvic pain, vaginal discharge and vaginal pain  Skin: Negative for color change and rash  Neurological: Negative for dizziness, numbness and headaches  Psychiatric/Behavioral: Negative for agitation and confusion  Physical Exam:  /58 (BP Location: Right arm, Patient Position: Sitting, Cuff Size: Standard)   Ht 5' 3" (1 6 m)   Wt 66 3 kg (146 lb 3 2 oz)   LMP 04/01/2022 (Approximate)   Breastfeeding No   BMI 25 90 kg/m²    Physical Exam  Constitutional:       General: She is not in acute distress  Appearance: Normal appearance  She is normal weight  Genitourinary:      Vulva, bladder, rectum and urethral meatus normal       No lesions in the vagina  Right Labia: No rash, tenderness or lesions  Left Labia: No tenderness, lesions or rash  No labial fusion noted  No vaginal discharge or tenderness  No vaginal prolapse present  No vaginal atrophy present  Right Adnexa: not tender, not full and no mass present  Left Adnexa: not tender, not full and no mass present  No cervical motion tenderness or friability  Uterus is not enlarged or prolapsed  Breasts:      Breasts are soft  Right: No inverted nipple, mass, nipple discharge or skin change  Left: No inverted nipple, mass, nipple discharge or skin change  HENT:      Head: Normocephalic and atraumatic  Nose: Nose normal    Eyes:      Conjunctiva/sclera: Conjunctivae normal       Pupils: Pupils are equal, round, and reactive to light  Cardiovascular:      Rate and Rhythm: Normal rate and regular rhythm  Pulses: Normal pulses  Heart sounds: Normal heart sounds  Pulmonary:      Effort: Pulmonary effort is normal  No respiratory distress  Breath sounds: Normal breath sounds  No wheezing  Abdominal:      General: Abdomen is flat  There is no distension  Palpations: Abdomen is soft  Tenderness: There is no abdominal tenderness  There is no guarding     Musculoskeletal: General: Normal range of motion  Cervical back: Normal range of motion and neck supple  Neurological:      General: No focal deficit present  Mental Status: She is alert and oriented to person, place, and time  Mental status is at baseline  Skin:     General: Skin is warm and dry  Psychiatric:         Mood and Affect: Mood normal          Behavior: Behavior normal          Thought Content: Thought content normal          Judgment: Judgment normal    Vitals and nursing note reviewed

## 2022-04-25 LAB
HPV HR 12 DNA CVX QL NAA+PROBE: NEGATIVE
HPV16 DNA CVX QL NAA+PROBE: NEGATIVE
HPV18 DNA CVX QL NAA+PROBE: NEGATIVE

## 2022-04-28 LAB
LAB AP GYN PRIMARY INTERPRETATION: NORMAL
Lab: NORMAL

## 2022-09-20 ENCOUNTER — OFFICE VISIT (OUTPATIENT)
Dept: NEUROLOGY | Facility: CLINIC | Age: 37
End: 2022-09-20
Payer: COMMERCIAL

## 2022-09-20 VITALS
HEART RATE: 73 BPM | HEIGHT: 63 IN | WEIGHT: 146 LBS | DIASTOLIC BLOOD PRESSURE: 82 MMHG | SYSTOLIC BLOOD PRESSURE: 120 MMHG | BODY MASS INDEX: 25.87 KG/M2

## 2022-09-20 DIAGNOSIS — G43.009 MIGRAINE WITHOUT AURA AND WITHOUT STATUS MIGRAINOSUS, NOT INTRACTABLE: Primary | ICD-10-CM

## 2022-09-20 PROCEDURE — 99213 OFFICE O/P EST LOW 20 MIN: CPT | Performed by: PSYCHIATRY & NEUROLOGY

## 2022-09-20 NOTE — PROGRESS NOTES
Kerry Mckeon is a 44 y o  female returns in follow-up today history migraine headache    Assessment:  1  Migraine without aura and without status migrainosus, not intractable        Plan:  Retry Maxalt   Follow-up 6 months    Discussion:  Fabi Curry reports adverse effect from Maxalt the last 2 times she took it, the 2nd time being much milder than the 1st   She continues to find it effective in stopping her headache  She will try again and if it recurs consider a trial of Nurtec as she did not find Imitrex helpful in the past   She will consider physical therapy for her neck pain and back pain and I will otherwise see her back in follow-up in 6 months      Subjective:    HPI  Fabi Curry returns in follow-up today  She reports that since here last she has found that her headaches are consistently related to her menses  Typically Maxalt has been very effective in stopping her headache when she gets it  She states that couple of months ago she did not have any Maxalt with her so she took an Excedrin  She states an hour later when the headache talk on away she did take a Maxalt  She states that shortly afterwards she felt that she broke out in hives and had her heart racing with palpitations  This lasted an hour or 2 and then resolved  She states that she took a Maxalt a few days later and had a much milder reaction but was somewhat similar  She states she has avoided taking Maxalt since that time and has been using Excedrin but does not find it to be as effective  She continues to have some issues of neck pain and back pain and states she had difficulty finding a physical therapy establishment that would take her insurance        Past Medical History:   Diagnosis Date    Abnormal Pap smear of cervix     , spontaneous complete 2017    Transitioned From: Spontaneous , incomplete    HPV (human papilloma virus) infection     UTI (urinary tract infection) 2016    Varicella vaccine       Family History:  Family History   Problem Relation Age of Onset    Hyperlipidemia Father     Heart disease Father         defect    Heart disease Maternal Grandmother         defect    Cancer Maternal Grandfather         lung    Heart disease Maternal Grandfather         defect    COPD Maternal Grandfather         emphysema    Cancer Paternal Grandmother         lung    Hyperlipidemia Paternal Grandfather     Heart disease Paternal Grandfather         defect    No Known Problems Mother     No Known Problems Brother     No Known Problems Son     No Known Problems Son     Migraines Maternal Aunt     Migraines Paternal Aunt        Past Surgical History:  Past Surgical History:   Procedure Laterality Date     SECTION       CPD?  COLPOSCOPY      2016    HI  DELIVERY ONLY N/A 2018    Procedure:  SECTION () REPEAT;  Surgeon: Chen Hardy MD;  [facility] ;  Service: Obstetrics    HI  DELIVERY ONLY N/A 2021    Procedure:  SECTION () REPEAT;  Surgeon: Jeanine Ferrer DO;  [facility] ;  Service: Obstetrics    WISDOM TOOTH EXTRACTION Bilateral        Social History:   reports that she has never smoked  She has never used smokeless tobacco  She reports previous alcohol use of about 2 0 standard drinks of alcohol per week  She reports that she does not use drugs  Allergies:  Patient has no known allergies  Current Outpatient Medications:     rizatriptan (MAXALT) 10 MG tablet, One p o  At headache onset, may repeat 1 after 2 hours p r n   Maximum 2/24 hours, Disp: 9 tablet, Rfl: 5    calcium carbonate (TUMS) 500 mg chewable tablet, Chew 2 tablets daily after dinner (Patient not taking: Reported on 10/25/2021), Disp: , Rfl:     Ferrous Sulfate (IRON PO), Take 100 mg of iron by mouth daily (Patient not taking: No sig reported), Disp: , Rfl:     MAGNESIUM PO, Take by mouth (Patient not taking: No sig reported), Disp: , Rfl:     Prenat w/o R-HI-Bzarjwe-FA-DHA (PNV-DHA PO), Take by mouth (Patient not taking: Reported on 10/25/2021), Disp: , Rfl:     I have reviewed the past medical, social and family history, current medications, allergies, vitals, review of systems and updated this information as appropriate today     Objective:    Vitals:  Height 5' 3" (1 6 m), weight 66 2 kg (146 lb), not currently breastfeeding  Physical Exam    Neurological Exam  GENERAL:  Well-developed well-nourished woman in no acute distress  HEENT/NECK: Head is atraumatic normocephalic, neck is supple  NEUROLOGIC:  Mental Status: Awake and alert without aphasia  Cranial Nerves: Extraocular movements are full  Face is symmetrical  Coordination:  Gait is stable            ROS:    Review of Systems   Constitutional: Negative  Negative for appetite change and fever  HENT: Negative  Negative for hearing loss, tinnitus, trouble swallowing and voice change  Eyes: Negative  Negative for photophobia and pain  Respiratory: Negative  Negative for shortness of breath  Cardiovascular: Negative  Negative for palpitations  Gastrointestinal: Negative  Negative for nausea and vomiting  Endocrine: Negative  Negative for cold intolerance  Genitourinary: Negative  Negative for dysuria, frequency and urgency  Musculoskeletal: Negative  Negative for myalgias and neck pain  Skin: Negative  Negative for rash  Neurological: Positive for headaches (migraines twice a month)  Negative for dizziness, tremors, seizures, syncope, facial asymmetry, speech difficulty, weakness, light-headedness and numbness  Hematological: Negative  Does not bruise/bleed easily  Psychiatric/Behavioral: Negative  Negative for confusion, hallucinations and sleep disturbance

## 2023-02-15 ENCOUNTER — TELEPHONE (OUTPATIENT)
Dept: NEUROLOGY | Facility: CLINIC | Age: 38
End: 2023-02-15

## 2023-02-15 NOTE — TELEPHONE ENCOUNTER
Left message informing that appointment on 3/20 with Dr Rolf Islas needs to be reschedule  Provider will be going part time and will only being seeing patients on Wednesdays and Thursday going forward  If patient calls back, please schedule accordingly  Please note that although some Mondays, Tuesdays and Fridays may still show as open on his schedule he is no longer in the office those days of the week  This patient should be rescheduled to a Wednesday to avoid having to reschedule them again

## 2023-05-14 NOTE — PROGRESS NOTES
Please refer to the [facility] ultrasound report in Ob Procedures for additional information regarding the visit to the [facility], [facility]  today 
no

## 2023-09-18 ENCOUNTER — TELEPHONE (OUTPATIENT)
Dept: NEUROLOGY | Facility: CLINIC | Age: 38
End: 2023-09-18

## 2023-09-18 NOTE — TELEPHONE ENCOUNTER
Called and LVM for patient regarding confirmation for upcoming appt w/ Dr. Tarik Dunne. Provided patient w/ appt time, date, and location.

## 2023-09-20 NOTE — TELEPHONE ENCOUNTER
Patient called in stating she needs to cancel appt w/ Dr. Cyndi Sterling 09/27 due to insurance changes. Asked pt if she would like to r/s but pt stated she has a new insurance that is not covered w/ [facility] Appt is now cancelled.

## (undated) DEVICE — Device

## (undated) DEVICE — MEDI-VAC YANKAUER SUCTION HANDLE W/STRAIGHT TIP & CONTROL VENT: Brand: CARDINAL HEALTH

## (undated) DEVICE — GLOVE SRG BIOGEL ECLIPSE 7

## (undated) DEVICE — SUT VICRYL 0 CTX 36 IN J978H

## (undated) DEVICE — SUT PLAIN 2-0 CTX 27 IN 872H

## (undated) DEVICE — GLOVE PI ULTRA TOUCH SZ.6.5

## (undated) DEVICE — GAUZE SPONGES,16 PLY: Brand: CURITY

## (undated) DEVICE — CHLORAPREP HI-LITE 26ML ORANGE

## (undated) DEVICE — PACK C-SECTION PBDS

## (undated) DEVICE — GLOVE INDICATOR PI UNDERGLOVE SZ 7 BLUE

## (undated) DEVICE — ADHESIVE SKIN HIGH VISCOSITY EXOFIN MICRO 0.5ML

## (undated) DEVICE — SUT MONOCRYL 0 CTX 36 IN Y398H

## (undated) DEVICE — SUT MONOCRYL 3-0 UNDYED KS CS-1 Y523H

## (undated) DEVICE — ADHESIVE SKN CLSR HISTOACRYL FLEX 0.5ML LF

## (undated) DEVICE — SUT VICRYL 4-0 KS 27 IN J662H

## (undated) DEVICE — SKIN MARKER DUAL TIP WITH RULER CAP, FLEXIBLE RULER AND LABELS: Brand: DEVON

## (undated) DEVICE — TELFA NON-ADHERENT ABSORBENT DRESSING: Brand: TELFA

## (undated) DEVICE — ABDOMINAL PAD: Brand: DERMACEA

## (undated) DEVICE — SPONGE SCRUB 4 PCT CHLORHEXIDINE

## (undated) DEVICE — SUT VICRYL 0 CT-1 27 IN J260H